# Patient Record
Sex: FEMALE | Race: WHITE | NOT HISPANIC OR LATINO
[De-identification: names, ages, dates, MRNs, and addresses within clinical notes are randomized per-mention and may not be internally consistent; named-entity substitution may affect disease eponyms.]

---

## 2017-01-23 ENCOUNTER — APPOINTMENT (OUTPATIENT)
Dept: INTERNAL MEDICINE | Facility: CLINIC | Age: 82
End: 2017-01-23

## 2017-01-23 VITALS
WEIGHT: 113 LBS | SYSTOLIC BLOOD PRESSURE: 120 MMHG | BODY MASS INDEX: 22.19 KG/M2 | HEIGHT: 60 IN | DIASTOLIC BLOOD PRESSURE: 80 MMHG

## 2017-01-24 ENCOUNTER — RESULT REVIEW (OUTPATIENT)
Age: 82
End: 2017-01-24

## 2017-01-24 LAB
ANION GAP SERPL CALC-SCNC: 16 MMOL/L
BASOPHILS # BLD AUTO: 0.04 K/UL
BASOPHILS NFR BLD AUTO: 0.5 %
BUN SERPL-MCNC: 16 MG/DL
CALCIUM SERPL-MCNC: 9.6 MG/DL
CHLORIDE SERPL-SCNC: 106 MMOL/L
CHOLEST SERPL-MCNC: 197 MG/DL
CHOLEST/HDLC SERPL: 2.5 RATIO
CO2 SERPL-SCNC: 20 MMOL/L
CREAT SERPL-MCNC: 0.65 MG/DL
EOSINOPHIL # BLD AUTO: 0.15 K/UL
EOSINOPHIL NFR BLD AUTO: 2 %
GLUCOSE SERPL-MCNC: 139 MG/DL
HCT VFR BLD CALC: 37.3 %
HDLC SERPL-MCNC: 80 MG/DL
HGB BLD-MCNC: 11.9 G/DL
IMM GRANULOCYTES NFR BLD AUTO: 0.1 %
LDLC SERPL CALC-MCNC: 100 MG/DL
LYMPHOCYTES # BLD AUTO: 1.82 K/UL
LYMPHOCYTES NFR BLD AUTO: 23.9 %
MAN DIFF?: NORMAL
MCHC RBC-ENTMCNC: 29.5 PG
MCHC RBC-ENTMCNC: 31.9 GM/DL
MCV RBC AUTO: 92.6 FL
MONOCYTES # BLD AUTO: 0.39 K/UL
MONOCYTES NFR BLD AUTO: 5.1 %
NEUTROPHILS # BLD AUTO: 5.2 K/UL
NEUTROPHILS NFR BLD AUTO: 68.4 %
PLATELET # BLD AUTO: 239 K/UL
POTASSIUM SERPL-SCNC: 4.2 MMOL/L
RBC # BLD: 4.03 M/UL
RBC # FLD: 13.4 %
SODIUM SERPL-SCNC: 143 MMOL/L
T4 FREE SERPL-MCNC: 1.2 NG/DL
TRIGL SERPL-MCNC: 84 MG/DL
TSH SERPL-ACNC: 1.78 UIU/ML
WBC # FLD AUTO: 7.61 K/UL

## 2017-03-01 ENCOUNTER — RX RENEWAL (OUTPATIENT)
Age: 82
End: 2017-03-01

## 2017-05-18 ENCOUNTER — APPOINTMENT (OUTPATIENT)
Dept: INTERNAL MEDICINE | Facility: CLINIC | Age: 82
End: 2017-05-18

## 2017-05-23 ENCOUNTER — APPOINTMENT (OUTPATIENT)
Dept: INTERNAL MEDICINE | Facility: CLINIC | Age: 82
End: 2017-05-23

## 2017-08-05 ENCOUNTER — RX RENEWAL (OUTPATIENT)
Age: 82
End: 2017-08-05

## 2017-08-08 ENCOUNTER — APPOINTMENT (OUTPATIENT)
Dept: INTERNAL MEDICINE | Facility: CLINIC | Age: 82
End: 2017-08-08
Payer: MEDICARE

## 2017-08-08 VITALS — BODY MASS INDEX: 22.58 KG/M2 | HEIGHT: 60 IN | WEIGHT: 115 LBS

## 2017-08-08 VITALS
DIASTOLIC BLOOD PRESSURE: 70 MMHG | HEIGHT: 60 IN | WEIGHT: 115 LBS | SYSTOLIC BLOOD PRESSURE: 118 MMHG | BODY MASS INDEX: 22.58 KG/M2

## 2017-08-08 PROCEDURE — 36415 COLL VENOUS BLD VENIPUNCTURE: CPT

## 2017-08-08 PROCEDURE — 99213 OFFICE O/P EST LOW 20 MIN: CPT | Mod: 25

## 2017-08-10 LAB
25(OH)D3 SERPL-MCNC: 32.9 NG/ML
ANION GAP SERPL CALC-SCNC: 16 MMOL/L
BASOPHILS # BLD AUTO: 0.07 K/UL
BASOPHILS NFR BLD AUTO: 1.1 %
BUN SERPL-MCNC: 21 MG/DL
CALCIUM SERPL-MCNC: 10.1 MG/DL
CHLORIDE SERPL-SCNC: 102 MMOL/L
CHOLEST SERPL-MCNC: 225 MG/DL
CHOLEST/HDLC SERPL: 2.6 RATIO
CO2 SERPL-SCNC: 24 MMOL/L
CREAT SERPL-MCNC: 0.64 MG/DL
EOSINOPHIL # BLD AUTO: 0.14 K/UL
EOSINOPHIL NFR BLD AUTO: 2.2 %
GLUCOSE SERPL-MCNC: 92 MG/DL
HCT VFR BLD CALC: 39.3 %
HDLC SERPL-MCNC: 86 MG/DL
HGB BLD-MCNC: 12.3 G/DL
IMM GRANULOCYTES NFR BLD AUTO: 0.2 %
LDLC SERPL CALC-MCNC: 123 MG/DL
LYMPHOCYTES # BLD AUTO: 1.69 K/UL
LYMPHOCYTES NFR BLD AUTO: 26.1 %
MAN DIFF?: NORMAL
MCHC RBC-ENTMCNC: 28.6 PG
MCHC RBC-ENTMCNC: 31.3 GM/DL
MCV RBC AUTO: 91.4 FL
MONOCYTES # BLD AUTO: 0.33 K/UL
MONOCYTES NFR BLD AUTO: 5.1 %
NEUTROPHILS # BLD AUTO: 4.24 K/UL
NEUTROPHILS NFR BLD AUTO: 65.3 %
PLATELET # BLD AUTO: 267 K/UL
POTASSIUM SERPL-SCNC: 4.3 MMOL/L
RBC # BLD: 4.3 M/UL
RBC # FLD: 13.8 %
SODIUM SERPL-SCNC: 142 MMOL/L
T4 FREE SERPL-MCNC: 1.4 NG/DL
TRIGL SERPL-MCNC: 79 MG/DL
TSH SERPL-ACNC: 1.34 UIU/ML
WBC # FLD AUTO: 6.48 K/UL

## 2017-08-30 ENCOUNTER — RX RENEWAL (OUTPATIENT)
Age: 82
End: 2017-08-30

## 2017-11-07 ENCOUNTER — APPOINTMENT (OUTPATIENT)
Dept: INTERNAL MEDICINE | Facility: CLINIC | Age: 82
End: 2017-11-07
Payer: MEDICARE

## 2017-11-07 VITALS
DIASTOLIC BLOOD PRESSURE: 70 MMHG | BODY MASS INDEX: 22.58 KG/M2 | OXYGEN SATURATION: 98 % | WEIGHT: 115 LBS | SYSTOLIC BLOOD PRESSURE: 112 MMHG | HEART RATE: 70 BPM | HEIGHT: 60 IN

## 2017-11-07 PROCEDURE — 99214 OFFICE O/P EST MOD 30 MIN: CPT | Mod: 25

## 2017-11-07 PROCEDURE — 36415 COLL VENOUS BLD VENIPUNCTURE: CPT

## 2017-11-07 PROCEDURE — G0009: CPT

## 2017-11-07 PROCEDURE — 90670 PCV13 VACCINE IM: CPT

## 2017-11-07 PROCEDURE — 90662 IIV NO PRSV INCREASED AG IM: CPT

## 2017-11-07 PROCEDURE — G0008: CPT

## 2017-11-10 LAB
ANION GAP SERPL CALC-SCNC: 11 MMOL/L
BUN SERPL-MCNC: 18 MG/DL
CALCIUM SERPL-MCNC: 10 MG/DL
CHLORIDE SERPL-SCNC: 104 MMOL/L
CHOLEST SERPL-MCNC: 205 MG/DL
CHOLEST/HDLC SERPL: 2.7 RATIO
CO2 SERPL-SCNC: 29 MMOL/L
CREAT SERPL-MCNC: 0.74 MG/DL
GLUCOSE SERPL-MCNC: 83 MG/DL
HDLC SERPL-MCNC: 77 MG/DL
LDLC SERPL CALC-MCNC: 110 MG/DL
POTASSIUM SERPL-SCNC: 4.2 MMOL/L
SODIUM SERPL-SCNC: 144 MMOL/L
T4 FREE SERPL-MCNC: 1.3 NG/DL
TRIGL SERPL-MCNC: 88 MG/DL
TSH SERPL-ACNC: 1.5 UIU/ML

## 2018-01-02 ENCOUNTER — APPOINTMENT (OUTPATIENT)
Dept: INTERNAL MEDICINE | Facility: CLINIC | Age: 83
End: 2018-01-02

## 2018-01-18 ENCOUNTER — RX RENEWAL (OUTPATIENT)
Age: 83
End: 2018-01-18

## 2018-02-13 ENCOUNTER — NON-APPOINTMENT (OUTPATIENT)
Age: 83
End: 2018-02-13

## 2018-02-13 ENCOUNTER — APPOINTMENT (OUTPATIENT)
Dept: INTERNAL MEDICINE | Facility: CLINIC | Age: 83
End: 2018-02-13
Payer: MEDICARE

## 2018-02-13 VITALS
DIASTOLIC BLOOD PRESSURE: 68 MMHG | HEART RATE: 67 BPM | HEIGHT: 60 IN | SYSTOLIC BLOOD PRESSURE: 118 MMHG | WEIGHT: 115 LBS | OXYGEN SATURATION: 98 % | BODY MASS INDEX: 22.58 KG/M2

## 2018-02-13 VITALS
HEART RATE: 67 BPM | HEIGHT: 72 IN | SYSTOLIC BLOOD PRESSURE: 118 MMHG | BODY MASS INDEX: 15.58 KG/M2 | DIASTOLIC BLOOD PRESSURE: 68 MMHG | WEIGHT: 115 LBS

## 2018-02-13 DIAGNOSIS — Z82.3 FAMILY HISTORY OF STROKE: ICD-10-CM

## 2018-02-13 DIAGNOSIS — Z82.49 FAMILY HISTORY OF ISCHEMIC HEART DISEASE AND OTHER DISEASES OF THE CIRCULATORY SYSTEM: ICD-10-CM

## 2018-02-13 DIAGNOSIS — Z92.29 PERSONAL HISTORY OF OTHER DRUG THERAPY: ICD-10-CM

## 2018-02-13 DIAGNOSIS — Z86.73 PERSONAL HISTORY OF TRANSIENT ISCHEMIC ATTACK (TIA), AND CEREBRAL INFARCTION W/OUT RESIDUAL DEFICITS: ICD-10-CM

## 2018-02-13 DIAGNOSIS — S81.811A LACERATION W/OUT FOREIGN BODY, RIGHT LOWER LEG, INITIAL ENCOUNTER: ICD-10-CM

## 2018-02-13 DIAGNOSIS — Z23 ENCOUNTER FOR IMMUNIZATION: ICD-10-CM

## 2018-02-13 PROCEDURE — G0439: CPT

## 2018-02-13 PROCEDURE — 36415 COLL VENOUS BLD VENIPUNCTURE: CPT

## 2018-02-14 LAB
25(OH)D3 SERPL-MCNC: 26.3 NG/ML
ALBUMIN SERPL ELPH-MCNC: 4.4 G/DL
ALP BLD-CCNC: 82 U/L
ALT SERPL-CCNC: 11 U/L
ANION GAP SERPL CALC-SCNC: 18 MMOL/L
AST SERPL-CCNC: 12 U/L
BASOPHILS # BLD AUTO: 0.06 K/UL
BASOPHILS NFR BLD AUTO: 0.9 %
BILIRUB SERPL-MCNC: 0.3 MG/DL
BUN SERPL-MCNC: 14 MG/DL
CALCIUM SERPL-MCNC: 9.6 MG/DL
CHLORIDE SERPL-SCNC: 102 MMOL/L
CHOLEST SERPL-MCNC: 213 MG/DL
CHOLEST/HDLC SERPL: 2.3 RATIO
CO2 SERPL-SCNC: 25 MMOL/L
CREAT SERPL-MCNC: 0.68 MG/DL
EOSINOPHIL # BLD AUTO: 0.23 K/UL
EOSINOPHIL NFR BLD AUTO: 3.6 %
GLUCOSE SERPL-MCNC: 48 MG/DL
HBA1C MFR BLD HPLC: 5.8 %
HCT VFR BLD CALC: 39.4 %
HDLC SERPL-MCNC: 91 MG/DL
HGB BLD-MCNC: 12.3 G/DL
IMM GRANULOCYTES NFR BLD AUTO: 0.3 %
LDLC SERPL CALC-MCNC: 106 MG/DL
LYMPHOCYTES # BLD AUTO: 1.64 K/UL
LYMPHOCYTES NFR BLD AUTO: 25.7 %
MAN DIFF?: NORMAL
MCHC RBC-ENTMCNC: 28.8 PG
MCHC RBC-ENTMCNC: 31.2 GM/DL
MCV RBC AUTO: 92.3 FL
MONOCYTES # BLD AUTO: 0.44 K/UL
MONOCYTES NFR BLD AUTO: 6.9 %
NEUTROPHILS # BLD AUTO: 4 K/UL
NEUTROPHILS NFR BLD AUTO: 62.6 %
PLATELET # BLD AUTO: 283 K/UL
POTASSIUM SERPL-SCNC: 4.3 MMOL/L
PROT SERPL-MCNC: 7.1 G/DL
RBC # BLD: 4.27 M/UL
RBC # FLD: 14.1 %
SODIUM SERPL-SCNC: 145 MMOL/L
T4 FREE SERPL-MCNC: 1.4 NG/DL
TRIGL SERPL-MCNC: 78 MG/DL
TSH SERPL-ACNC: 1.14 UIU/ML
WBC # FLD AUTO: 6.39 K/UL

## 2018-05-22 ENCOUNTER — APPOINTMENT (OUTPATIENT)
Dept: INTERNAL MEDICINE | Facility: CLINIC | Age: 83
End: 2018-05-22
Payer: MEDICARE

## 2018-05-22 VITALS
BODY MASS INDEX: 22.58 KG/M2 | WEIGHT: 115 LBS | HEART RATE: 68 BPM | DIASTOLIC BLOOD PRESSURE: 70 MMHG | OXYGEN SATURATION: 96 % | SYSTOLIC BLOOD PRESSURE: 124 MMHG | HEIGHT: 60 IN

## 2018-05-22 PROCEDURE — 36415 COLL VENOUS BLD VENIPUNCTURE: CPT

## 2018-05-22 PROCEDURE — 99214 OFFICE O/P EST MOD 30 MIN: CPT | Mod: 25

## 2018-05-22 NOTE — ASSESSMENT
[FreeTextEntry1] : She is doing very well. Blood pressure is controlled.\par Check labs.\par She had flu and Prevnar vaccines.\par No cancer screening indicated given age.\par Followup in 4 months.

## 2018-05-22 NOTE — REVIEW OF SYSTEMS
[Hoarseness] : hoarseness [Negative] : Heme/Lymph [FreeTextEntry4] : tinnitus [FreeTextEntry7] : reflux [FreeTextEntry9] : knee pain

## 2018-05-22 NOTE — HISTORY OF PRESENT ILLNESS
[de-identified] : Patient presents for follow up of hypertension on amlodipine and enalapril, hypothyroidism on Synthroid and GERD on omeprazole. She also has hyperlipidemia and prediabetes not on medication. \par She has been seeing Dr. Berg for left knee osteoarthritis and right knee bursitis. She has had steroid injections x2 since January with some improvement. She feels well today.\par

## 2018-05-22 NOTE — PHYSICAL EXAM

## 2018-05-23 LAB
ANION GAP SERPL CALC-SCNC: 12 MMOL/L
BUN SERPL-MCNC: 15 MG/DL
CALCIUM SERPL-MCNC: 9.5 MG/DL
CHLORIDE SERPL-SCNC: 106 MMOL/L
CHOLEST SERPL-MCNC: 216 MG/DL
CHOLEST/HDLC SERPL: 2.7 RATIO
CO2 SERPL-SCNC: 26 MMOL/L
CREAT SERPL-MCNC: 0.68 MG/DL
GLUCOSE SERPL-MCNC: 86 MG/DL
HDLC SERPL-MCNC: 81 MG/DL
LDLC SERPL CALC-MCNC: 122 MG/DL
POTASSIUM SERPL-SCNC: 4.1 MMOL/L
SODIUM SERPL-SCNC: 144 MMOL/L
T4 FREE SERPL-MCNC: 1.6 NG/DL
TRIGL SERPL-MCNC: 66 MG/DL
TSH SERPL-ACNC: 1.06 UIU/ML

## 2018-07-17 ENCOUNTER — RX RENEWAL (OUTPATIENT)
Age: 83
End: 2018-07-17

## 2018-08-07 ENCOUNTER — RX RENEWAL (OUTPATIENT)
Age: 83
End: 2018-08-07

## 2018-08-16 ENCOUNTER — APPOINTMENT (OUTPATIENT)
Dept: INTERNAL MEDICINE | Facility: CLINIC | Age: 83
End: 2018-08-16
Payer: MEDICARE

## 2018-08-16 VITALS
OXYGEN SATURATION: 97 % | HEART RATE: 77 BPM | BODY MASS INDEX: 22.19 KG/M2 | WEIGHT: 113 LBS | SYSTOLIC BLOOD PRESSURE: 124 MMHG | DIASTOLIC BLOOD PRESSURE: 76 MMHG | HEIGHT: 60 IN

## 2018-08-16 PROCEDURE — 99214 OFFICE O/P EST MOD 30 MIN: CPT | Mod: 25

## 2018-08-16 PROCEDURE — 36415 COLL VENOUS BLD VENIPUNCTURE: CPT

## 2018-08-16 NOTE — PHYSICAL EXAM
[No Acute Distress] : no acute distress [Well Nourished] : well nourished [Well Developed] : well developed [Well-Appearing] : well-appearing [Normal Sclera/Conjunctiva] : normal sclera/conjunctiva [PERRL] : pupils equal round and reactive to light [EOMI] : extraocular movements intact [Normal Outer Ear/Nose] : the outer ears and nose were normal in appearance [Normal Oropharynx] : the oropharynx was normal [No JVD] : no jugular venous distention [Supple] : supple [No Lymphadenopathy] : no lymphadenopathy [Thyroid Normal, No Nodules] : the thyroid was normal and there were no nodules present [No Respiratory Distress] : no respiratory distress  [Clear to Auscultation] : lungs were clear to auscultation bilaterally [No Accessory Muscle Use] : no accessory muscle use [Normal Rate] : normal rate  [Normal S1, S2] : normal S1 and S2 [No Murmur] : no murmur heard [No Edema] : there was no peripheral edema [Normal Affect] : the affect was normal [Normal Insight/Judgement] : insight and judgment were intact [de-identified] : occasional skipped beat

## 2018-08-16 NOTE — HISTORY OF PRESENT ILLNESS
[de-identified] : Patient presents for follow up of hypertension on amlodipine and enalapril, hypothyroidism on Synthroid and GERD on omeprazole. She also has hyperlipidemia and prediabetes not on medication. She complains of fatigue, had a few episodes of extreme fatigue upon awakening in the morning, resolved by noon. No fever, chills, chest pain, palpitations. Feels well today.

## 2018-08-16 NOTE — REVIEW OF SYSTEMS
[Fatigue] : fatigue [Hoarseness] : hoarseness [Negative] : Heme/Lymph [FreeTextEntry4] : tinnitus [FreeTextEntry7] : reflux [FreeTextEntry9] : knee pain

## 2018-08-19 LAB
25(OH)D3 SERPL-MCNC: 33.1 NG/ML
ALBUMIN SERPL ELPH-MCNC: 4.4 G/DL
ALP BLD-CCNC: 83 U/L
ALT SERPL-CCNC: 12 U/L
ANION GAP SERPL CALC-SCNC: 16 MMOL/L
AST SERPL-CCNC: 10 U/L
BILIRUB SERPL-MCNC: 0.2 MG/DL
BUN SERPL-MCNC: 14 MG/DL
CALCIUM SERPL-MCNC: 9.6 MG/DL
CHLORIDE SERPL-SCNC: 101 MMOL/L
CHOLEST SERPL-MCNC: 200 MG/DL
CHOLEST/HDLC SERPL: 2.6 RATIO
CO2 SERPL-SCNC: 23 MMOL/L
CREAT SERPL-MCNC: 0.72 MG/DL
GLUCOSE SERPL-MCNC: 101 MG/DL
HBA1C MFR BLD HPLC: 6 %
HDLC SERPL-MCNC: 78 MG/DL
LDLC SERPL CALC-MCNC: 108 MG/DL
MAGNESIUM SERPL-MCNC: 2 MG/DL
POTASSIUM SERPL-SCNC: 4.1 MMOL/L
PROT SERPL-MCNC: 6.6 G/DL
SODIUM SERPL-SCNC: 140 MMOL/L
T4 FREE SERPL-MCNC: 1.3 NG/DL
TRIGL SERPL-MCNC: 70 MG/DL
TSH SERPL-ACNC: 1.28 UIU/ML

## 2018-08-30 ENCOUNTER — APPOINTMENT (OUTPATIENT)
Dept: INTERNAL MEDICINE | Facility: CLINIC | Age: 83
End: 2018-08-30
Payer: MEDICARE

## 2018-08-30 ENCOUNTER — APPOINTMENT (OUTPATIENT)
Dept: RADIOLOGY | Facility: CLINIC | Age: 83
End: 2018-08-30
Payer: MEDICARE

## 2018-08-30 ENCOUNTER — OUTPATIENT (OUTPATIENT)
Dept: OUTPATIENT SERVICES | Facility: HOSPITAL | Age: 83
LOS: 1 days | End: 2018-08-30
Payer: MEDICARE

## 2018-08-30 VITALS
BODY MASS INDEX: 23.16 KG/M2 | SYSTOLIC BLOOD PRESSURE: 122 MMHG | HEIGHT: 60 IN | WEIGHT: 118 LBS | HEART RATE: 71 BPM | OXYGEN SATURATION: 98 % | DIASTOLIC BLOOD PRESSURE: 78 MMHG

## 2018-08-30 DIAGNOSIS — M79.672 PAIN IN LEFT FOOT: ICD-10-CM

## 2018-08-30 DIAGNOSIS — S93.402A SPRAIN OF UNSPECIFIED LIGAMENT OF LEFT ANKLE, INITIAL ENCOUNTER: ICD-10-CM

## 2018-08-30 PROCEDURE — 73630 X-RAY EXAM OF FOOT: CPT | Mod: 26,LT

## 2018-08-30 PROCEDURE — 73610 X-RAY EXAM OF ANKLE: CPT | Mod: 26,LT

## 2018-08-30 PROCEDURE — 73610 X-RAY EXAM OF ANKLE: CPT

## 2018-08-30 PROCEDURE — 73630 X-RAY EXAM OF FOOT: CPT

## 2018-08-30 PROCEDURE — 99214 OFFICE O/P EST MOD 30 MIN: CPT

## 2018-08-30 NOTE — HISTORY OF PRESENT ILLNESS
[FreeTextEntry8] : Patient complains of pain and swelling of her left ankle. States that one week ago, as she was walking up the stairs, she thought there was one more step and twisted her left ankle as she put it down. She developed pain in the left ankle, difficulty walking on it, was using her walker for several days. Has swelling in the left foot, ankle to calf for the past week. Has some improvement in pain now and able to walk without a walker. She has taken tylenol for pain.

## 2018-08-30 NOTE — ASSESSMENT
[FreeTextEntry1] : Xray left foot and ankle to rule out fracture. \par Tylenol prn pain. Advised ice, ace wrap, elevate foot, use walker.

## 2018-08-30 NOTE — PHYSICAL EXAM
[No Acute Distress] : no acute distress [No Respiratory Distress] : no respiratory distress  [Clear to Auscultation] : lungs were clear to auscultation bilaterally [No Accessory Muscle Use] : no accessory muscle use [Normal Rate] : normal rate  [Regular Rhythm] : with a regular rhythm [Normal S1, S2] : normal S1 and S2 [Grossly Normal Strength/Tone] : grossly normal strength/tone [Normal Affect] : the affect was normal [Alert and Oriented x3] : oriented to person, place, and time [Normal Insight/Judgement] : insight and judgment were intact [de-identified] : no point tenderness over left ankle, 2+ pitting edema to lower calf

## 2018-10-09 ENCOUNTER — RX RENEWAL (OUTPATIENT)
Age: 83
End: 2018-10-09

## 2018-11-12 ENCOUNTER — RX RENEWAL (OUTPATIENT)
Age: 83
End: 2018-11-12

## 2018-11-13 ENCOUNTER — LABORATORY RESULT (OUTPATIENT)
Age: 83
End: 2018-11-13

## 2018-11-13 ENCOUNTER — APPOINTMENT (OUTPATIENT)
Dept: INTERNAL MEDICINE | Facility: CLINIC | Age: 83
End: 2018-11-13
Payer: MEDICARE

## 2018-11-13 VITALS
OXYGEN SATURATION: 93 % | HEART RATE: 73 BPM | SYSTOLIC BLOOD PRESSURE: 120 MMHG | HEIGHT: 60 IN | BODY MASS INDEX: 22.58 KG/M2 | DIASTOLIC BLOOD PRESSURE: 70 MMHG | WEIGHT: 115 LBS

## 2018-11-13 PROCEDURE — 36415 COLL VENOUS BLD VENIPUNCTURE: CPT

## 2018-11-13 PROCEDURE — 90732 PPSV23 VACC 2 YRS+ SUBQ/IM: CPT

## 2018-11-13 PROCEDURE — 90662 IIV NO PRSV INCREASED AG IM: CPT

## 2018-11-13 PROCEDURE — G0008: CPT

## 2018-11-13 PROCEDURE — G0009: CPT

## 2018-11-13 PROCEDURE — 99214 OFFICE O/P EST MOD 30 MIN: CPT | Mod: 25

## 2018-11-13 NOTE — ASSESSMENT
[FreeTextEntry1] : Blood pressure is controlled. Continue current medications.\par Check labs. Further recommendations based on results.\par She Prevnar vaccine.\par Flu and pneumovax given today.\par No cancer screening indicated given age.\par Followup in 4 months.

## 2018-11-13 NOTE — HISTORY OF PRESENT ILLNESS
[de-identified] : Patient presents for follow up of hypertension on amlodipine and enalapril, hypothyroidism on Synthroid and GERD on omeprazole. She also has hyperlipidemia and prediabetes not on medication. She complains of leg cramps, has had vascular evaluation by Dr. Feliciano that was normal. Sees Dr. Haley for MSK complaints.

## 2018-11-13 NOTE — PHYSICAL EXAM
[No Acute Distress] : no acute distress [Well Nourished] : well nourished [Well Developed] : well developed [Well-Appearing] : well-appearing [No Respiratory Distress] : no respiratory distress  [Clear to Auscultation] : lungs were clear to auscultation bilaterally [No Accessory Muscle Use] : no accessory muscle use [Normal Rate] : normal rate  [Regular Rhythm] : with a regular rhythm [Normal S1, S2] : normal S1 and S2 [No Murmur] : no murmur heard [No Edema] : there was no peripheral edema [Normal Affect] : the affect was normal [Normal Insight/Judgement] : insight and judgment were intact

## 2018-11-15 LAB — T4 FREE SERPL-MCNC: 1.4 NG/DL

## 2019-03-07 ENCOUNTER — APPOINTMENT (OUTPATIENT)
Dept: INTERNAL MEDICINE | Facility: CLINIC | Age: 84
End: 2019-03-07

## 2019-03-26 ENCOUNTER — APPOINTMENT (OUTPATIENT)
Dept: INTERNAL MEDICINE | Facility: CLINIC | Age: 84
End: 2019-03-26
Payer: MEDICARE

## 2019-03-26 ENCOUNTER — NON-APPOINTMENT (OUTPATIENT)
Age: 84
End: 2019-03-26

## 2019-03-26 VITALS
HEART RATE: 60 BPM | WEIGHT: 118 LBS | HEIGHT: 60 IN | SYSTOLIC BLOOD PRESSURE: 128 MMHG | OXYGEN SATURATION: 99 % | BODY MASS INDEX: 23.16 KG/M2 | DIASTOLIC BLOOD PRESSURE: 70 MMHG

## 2019-03-26 PROCEDURE — G0439: CPT

## 2019-03-26 PROCEDURE — 36415 COLL VENOUS BLD VENIPUNCTURE: CPT

## 2019-03-26 PROCEDURE — 93000 ELECTROCARDIOGRAM COMPLETE: CPT

## 2019-03-26 PROCEDURE — G0444 DEPRESSION SCREEN ANNUAL: CPT | Mod: 59

## 2019-03-26 NOTE — HEALTH RISK ASSESSMENT
[Excellent] : ~his/her~ current health as excellent [Very Good] : ~his/her~  mood as very good [No falls in past year] : Patient reported no falls in the past year [0] : 2) Feeling down, depressed, or hopeless: Not at all (0) [None] : None [With Family] : lives with family [Retired] : retired [] :  [# Of Children ___] : has [unfilled] children [Feels Safe at Home] : Feels safe at home [Fully functional (bathing, dressing, toileting, transferring, walking, feeding)] : Fully functional (bathing, dressing, toileting, transferring, walking, feeding) [Fully functional (using the telephone, shopping, preparing meals, housekeeping, doing laundry, using] : Fully functional and needs no help or supervision to perform IADLs (using the telephone, shopping, preparing meals, housekeeping, doing laundry, using transportation, managing medications and managing finances) [Reports normal functional visual acuity (ie: able to read med bottle)] : Reports normal functional visual acuity [Smoke Detector] : smoke detector [Carbon Monoxide Detector] : carbon monoxide detector [Seat Belt] :  uses seat belt [With Patient/Caregiver] : With Patient/Caregiver [Designated Healthcare Proxy] : Designated healthcare proxy [Name: ___] : Health Care Proxy's Name: [unfilled]  [Relationship: ___] : Relationship: [unfilled] [FreeTextEntry1] : cold  [] : No [de-identified] : none [de-identified] : Dr. Colin - ENT, Dr. Fine - pain, Dr. Feliciano - vascular, Dr. Terrell [de-identified] : house work, gardening, walking [de-identified] : well rounded  [[id number]] : 0 [Change in mental status noted] : No change in mental status noted [Language] : denies difficulty with language [Behavior] : denies difficulty with behavior [Learning/Retaining New Information] : denies difficulty learning/retaining new information [Handling Complex Tasks] : denies difficulty handling complex tasks [Reasoning] : denies difficulty with reasoning [Spatial Ability and Orientation] : denies difficulty with spatial ability and orientation [Reports changes in hearing] : Reports no changes in hearing [Reports changes in vision] : Reports no changes in vision [Reports changes in dental health] : Reports no changes in dental health [Guns at Home] : no guns at home [Safety elements used in home] : no safety elements used in home [Sunscreen] : does not use sunscreen [Travel to Developing Areas] : does not  travel to developing areas [TB Exposure] : is not being exposed to tuberculosis [Caregiver Concerns] : does not have caregiver concerns [FreeTextEntry2] :   [AdvancecareDate] : 03/19

## 2019-03-26 NOTE — ASSESSMENT
[FreeTextEntry1] : Blood pressure is controlled. Continue current medications.\par Rx given for DEXA. \par Labs drawn in office. Further recommendations based on lab results. \par Current with specialists. \par Advised magnesium for leg cramps. \par Will follow up with vascular.\par She had Prevnar vaccine.\par Had Flu and pneumovax.\par No cancer screening indicated given age.\par Follow up in 4 months.

## 2019-03-26 NOTE — COUNSELING
[Healthy eating counseling provided] : healthy eating [Activity counseling provided] : activity [Fall prevention counseling provided] : fall prevention  [ - Annual Depression Screening] : Annual Depression Screening

## 2019-03-26 NOTE — HISTORY OF PRESENT ILLNESS
[FreeTextEntry1] : Patient presents for CPE.  [de-identified] : Patient presents for CPE. History is significant for hypertension on amlodipine and enalapril, hypothyroidism on Synthroid and GERD on omeprazole. She also has hyperlipidemia and prediabetes not on medication. She complains that she is always cold and that occasionally her hands turn white. She states that her leg pain and cramps have resolved. Otherwise she feels well.

## 2019-03-26 NOTE — ADDENDUM
[FreeTextEntry1] : I, Angelita Awad, acted solely as a scribe for Dr. Henry on this date [3/26/19]. \par

## 2019-03-26 NOTE — PHYSICAL EXAM

## 2019-03-26 NOTE — DATA REVIEWED
[FreeTextEntry1] : EKG normal sinus rhythm at 81 bpm with incomplete RBBB, nonspecific T wave abnormality.

## 2019-03-26 NOTE — END OF VISIT
[FreeTextEntry3] : All medical record entries made by the Scribe were at my, Dr. Henry's, direction and personally dictated by me on [3/26/19]. I have reviewed the chart and agree that the record accurately reflects my personal performance of the history, physical exam, assessment and plan. I have also personally directed, reviewed, and agreed with the chart.\par

## 2019-03-28 LAB
25(OH)D3 SERPL-MCNC: 33.6 NG/ML
ALBUMIN SERPL ELPH-MCNC: 4.6 G/DL
ALP BLD-CCNC: 73 U/L
ALT SERPL-CCNC: 15 U/L
ANION GAP SERPL CALC-SCNC: 12 MMOL/L
APPEARANCE: CLEAR
AST SERPL-CCNC: 12 U/L
BACTERIA: NEGATIVE
BASOPHILS # BLD AUTO: 0.09 K/UL
BASOPHILS NFR BLD AUTO: 1.6 %
BILIRUB SERPL-MCNC: 0.2 MG/DL
BILIRUBIN URINE: NEGATIVE
BLOOD URINE: NEGATIVE
BUN SERPL-MCNC: 16 MG/DL
CALCIUM SERPL-MCNC: 9.7 MG/DL
CHLORIDE SERPL-SCNC: 106 MMOL/L
CHOLEST SERPL-MCNC: 214 MG/DL
CHOLEST/HDLC SERPL: 2.5 RATIO
CO2 SERPL-SCNC: 28 MMOL/L
COLOR: YELLOW
CREAT SERPL-MCNC: 0.7 MG/DL
EOSINOPHIL # BLD AUTO: 0.17 K/UL
EOSINOPHIL NFR BLD AUTO: 3 %
ESTIMATED AVERAGE GLUCOSE: 126 MG/DL
GLUCOSE QUALITATIVE U: NEGATIVE
GLUCOSE SERPL-MCNC: 85 MG/DL
HBA1C MFR BLD HPLC: 6 %
HCT VFR BLD CALC: 41.8 %
HDLC SERPL-MCNC: 87 MG/DL
HGB BLD-MCNC: 12.6 G/DL
HYALINE CASTS: 0 /LPF
IMM GRANULOCYTES NFR BLD AUTO: 0.3 %
KETONES URINE: NEGATIVE
LDLC SERPL CALC-MCNC: 116 MG/DL
LEUKOCYTE ESTERASE URINE: NEGATIVE
LYMPHOCYTES # BLD AUTO: 1.88 K/UL
LYMPHOCYTES NFR BLD AUTO: 32.7 %
MAN DIFF?: NORMAL
MCHC RBC-ENTMCNC: 28.6 PG
MCHC RBC-ENTMCNC: 30.1 GM/DL
MCV RBC AUTO: 95 FL
MICROSCOPIC-UA: NORMAL
MONOCYTES # BLD AUTO: 0.46 K/UL
MONOCYTES NFR BLD AUTO: 8 %
NEUTROPHILS # BLD AUTO: 3.13 K/UL
NEUTROPHILS NFR BLD AUTO: 54.4 %
NITRITE URINE: NEGATIVE
PH URINE: 7
PLATELET # BLD AUTO: 273 K/UL
POTASSIUM SERPL-SCNC: 3.9 MMOL/L
PROT SERPL-MCNC: 7 G/DL
PROTEIN URINE: NEGATIVE
RBC # BLD: 4.4 M/UL
RBC # FLD: 13.6 %
RED BLOOD CELLS URINE: 5 /HPF
SODIUM SERPL-SCNC: 146 MMOL/L
SPECIFIC GRAVITY URINE: 1.01
SQUAMOUS EPITHELIAL CELLS: 0 /HPF
T4 FREE SERPL-MCNC: 1.3 NG/DL
TRIGL SERPL-MCNC: 53 MG/DL
TSH SERPL-ACNC: 3.11 UIU/ML
UROBILINOGEN URINE: NORMAL
WBC # FLD AUTO: 5.75 K/UL
WHITE BLOOD CELLS URINE: 0 /HPF

## 2019-05-28 ENCOUNTER — LABORATORY RESULT (OUTPATIENT)
Age: 84
End: 2019-05-28

## 2019-05-28 ENCOUNTER — APPOINTMENT (OUTPATIENT)
Dept: INTERNAL MEDICINE | Facility: CLINIC | Age: 84
End: 2019-05-28
Payer: MEDICARE

## 2019-05-28 ENCOUNTER — NON-APPOINTMENT (OUTPATIENT)
Age: 84
End: 2019-05-28

## 2019-05-28 VITALS
BODY MASS INDEX: 22.78 KG/M2 | RESPIRATION RATE: 15 BRPM | HEART RATE: 73 BPM | DIASTOLIC BLOOD PRESSURE: 70 MMHG | OXYGEN SATURATION: 97 % | SYSTOLIC BLOOD PRESSURE: 116 MMHG | WEIGHT: 116 LBS | HEIGHT: 60 IN

## 2019-05-28 LAB
BILIRUB UR QL STRIP: NORMAL
GLUCOSE UR-MCNC: NORMAL
HCG UR QL: 1 EU/DL
HGB UR QL STRIP.AUTO: NORMAL
KETONES UR-MCNC: NORMAL
LEUKOCYTE ESTERASE UR QL STRIP: NORMAL
NITRITE UR QL STRIP: POSITIVE
PH UR STRIP: 6
PROT UR STRIP-MCNC: NORMAL
SP GR UR STRIP: 1.01

## 2019-05-28 PROCEDURE — 99214 OFFICE O/P EST MOD 30 MIN: CPT | Mod: 25

## 2019-05-28 PROCEDURE — 36415 COLL VENOUS BLD VENIPUNCTURE: CPT

## 2019-05-28 PROCEDURE — 93000 ELECTROCARDIOGRAM COMPLETE: CPT

## 2019-05-28 PROCEDURE — 81003 URINALYSIS AUTO W/O SCOPE: CPT | Mod: QW

## 2019-05-28 NOTE — ADDENDUM
[FreeTextEntry1] : I, Meghana Anton, acted solely as a scribe for Dr. Henry on this date [5/28/2019].

## 2019-05-28 NOTE — PHYSICAL EXAM
[Well Nourished] : well nourished [No Acute Distress] : no acute distress [Well Developed] : well developed [Well-Appearing] : well-appearing [No Accessory Muscle Use] : no accessory muscle use [No Respiratory Distress] : no respiratory distress  [Clear to Auscultation] : lungs were clear to auscultation bilaterally [Regular Rhythm] : with a regular rhythm [Normal Rate] : normal rate  [No Murmur] : no murmur heard [Normal S1, S2] : normal S1 and S2 [Soft] : abdomen soft [Non Tender] : non-tender [No Masses] : no abdominal mass palpated [Non-distended] : non-distended [No HSM] : no HSM [Normal Bowel Sounds] : normal bowel sounds [No CVA Tenderness] : no CVA  tenderness [No Spinal Tenderness] : no spinal tenderness [Normal Affect] : the affect was normal [Alert and Oriented x3] : oriented to person, place, and time [Normal Insight/Judgement] : insight and judgment were intact

## 2019-05-28 NOTE — ASSESSMENT
[FreeTextEntry1] : UA positive. Rx given for Keflex. Urine culture sent out.\par Chest pain atypical in nature, EKG unchanged. Low likelihood of ACS but will send out troponin to rule out. \par Labs drawn in office. \par

## 2019-05-28 NOTE — REVIEW OF SYSTEMS
[Chest Pain] : chest pain [Frequency] : frequency [Negative] : Heme/Lymph [FreeTextEntry9] : abdominal pain, shoulder pain

## 2019-05-28 NOTE — HISTORY OF PRESENT ILLNESS
[FreeTextEntry8] : Patient presents with urinary frequency for three days. She is accompanied by her daughter. She had associated dizziness two days ago. She has an associated pulling pain in her chest, shoulders, and abdomen that lasted all day yesterday, not she has it only when she urinates. She denies shortness of breath, fever, nausea, and vomiting. She has had UTIs in the past. Never had this pulling sensation in chest with it.

## 2019-05-28 NOTE — END OF VISIT
[FreeTextEntry3] : All medical record entries made by the Scribe were at my, Dr. Henry's, direction and personally dictated by me on [5/28/2019]. I have reviewed the chart and agree that the record accurately reflects my personal performance of the history, physical exam, assessment and plan. I have also personally directed, reviewed and agreed with the chart.

## 2019-05-28 NOTE — DATA REVIEWED
[FreeTextEntry1] : EKG sinus rhythm at 70 bpm, diffuse low voltage, IRBBB, unchanged from prior.\par \par UA - positive nitrate, LE

## 2019-05-29 ENCOUNTER — RESULT REVIEW (OUTPATIENT)
Age: 84
End: 2019-05-29

## 2019-05-31 LAB — BACTERIA UR CULT: ABNORMAL

## 2019-06-14 ENCOUNTER — TRANSCRIPTION ENCOUNTER (OUTPATIENT)
Age: 84
End: 2019-06-14

## 2019-06-14 ENCOUNTER — APPOINTMENT (OUTPATIENT)
Dept: INTERNAL MEDICINE | Facility: CLINIC | Age: 84
End: 2019-06-14
Payer: MEDICARE

## 2019-06-14 VITALS
SYSTOLIC BLOOD PRESSURE: 112 MMHG | HEART RATE: 78 BPM | RESPIRATION RATE: 15 BRPM | WEIGHT: 117 LBS | BODY MASS INDEX: 22.97 KG/M2 | DIASTOLIC BLOOD PRESSURE: 70 MMHG | OXYGEN SATURATION: 98 % | HEIGHT: 60 IN

## 2019-06-14 DIAGNOSIS — M54.5 LOW BACK PAIN: ICD-10-CM

## 2019-06-14 PROCEDURE — 99214 OFFICE O/P EST MOD 30 MIN: CPT

## 2019-06-14 NOTE — ASSESSMENT
[FreeTextEntry1] : X ray of pelvis and right hip for further evaluation. Further recommendations based on results. \par Tylenol prn pain.

## 2019-06-14 NOTE — REVIEW OF SYSTEMS
[Back Pain] : back pain [Negative] : Constitutional [FreeTextEntry8] : UTI symptoms improved [FreeTextEntry9] : right hip/low back pain, difficulty walking due to pain

## 2019-06-14 NOTE — HISTORY OF PRESENT ILLNESS
[FreeTextEntry8] : Patient presents with severe, sharp right lower back pain/buttocks for 5 days that is worse with standing. The pain has resolved for now, it also resolved 2 days ago but it returned. Her walking has improved in the past 2 days. She has taken Tylenol with some improvement. She denies injury. She has never had this before. \par She went to urgent care 2 days ago with incontinence. She was treated for a UTI with Bactrim for 5 days.\par She is accompanied by her daughter.

## 2019-06-14 NOTE — ADDENDUM
[FreeTextEntry1] : I, Meghana Anton, acted solely as a scribe for Dr. Henry on this date [6/14/2019].

## 2019-06-14 NOTE — END OF VISIT
[FreeTextEntry3] : All medical record entries made by the Scribe were at my, Dr. Henry's, direction and personally dictated by me on [6/14/2019]. I have reviewed the chart and agree that the record accurately reflects my personal performance of the history, physical exam, assessment and plan. I have also personally directed, reviewed and agreed with the chart.

## 2019-06-14 NOTE — PHYSICAL EXAM
[No Acute Distress] : no acute distress [Well Nourished] : well nourished [Well Developed] : well developed [Well-Appearing] : well-appearing [No CVA Tenderness] : no CVA  tenderness [No Spinal Tenderness] : no spinal tenderness [Normal Affect] : the affect was normal [Alert and Oriented x3] : oriented to person, place, and time [Normal Insight/Judgement] : insight and judgment were intact [Grossly Normal Strength/Tone] : grossly normal strength/tone [de-identified] : antalgic gait, no tenderness over right hip, buttocks, low back

## 2019-06-17 ENCOUNTER — FORM ENCOUNTER (OUTPATIENT)
Age: 84
End: 2019-06-17

## 2019-06-18 ENCOUNTER — RESULT REVIEW (OUTPATIENT)
Age: 84
End: 2019-06-18

## 2019-06-18 ENCOUNTER — APPOINTMENT (OUTPATIENT)
Dept: RADIOLOGY | Facility: CLINIC | Age: 84
End: 2019-06-18
Payer: MEDICARE

## 2019-06-18 ENCOUNTER — OUTPATIENT (OUTPATIENT)
Dept: OUTPATIENT SERVICES | Facility: HOSPITAL | Age: 84
LOS: 1 days | End: 2019-06-18
Payer: MEDICARE

## 2019-06-18 DIAGNOSIS — M54.5 LOW BACK PAIN: ICD-10-CM

## 2019-06-18 PROCEDURE — 73502 X-RAY EXAM HIP UNI 2-3 VIEWS: CPT

## 2019-06-18 PROCEDURE — 73502 X-RAY EXAM HIP UNI 2-3 VIEWS: CPT | Mod: 26,RT

## 2019-06-21 ENCOUNTER — APPOINTMENT (OUTPATIENT)
Dept: INTERNAL MEDICINE | Facility: CLINIC | Age: 84
End: 2019-06-21
Payer: MEDICARE

## 2019-06-21 VITALS
OXYGEN SATURATION: 95 % | HEART RATE: 55 BPM | DIASTOLIC BLOOD PRESSURE: 60 MMHG | BODY MASS INDEX: 22.97 KG/M2 | SYSTOLIC BLOOD PRESSURE: 110 MMHG | WEIGHT: 117 LBS | HEIGHT: 60 IN | RESPIRATION RATE: 14 BRPM

## 2019-06-21 DIAGNOSIS — M25.551 PAIN IN RIGHT HIP: ICD-10-CM

## 2019-06-21 PROCEDURE — 99213 OFFICE O/P EST LOW 20 MIN: CPT

## 2019-06-21 NOTE — END OF VISIT
[FreeTextEntry3] : All medical record entries made by the Scribe were at my, Dr. Henry's, direction and personally dictated by me on [6/21/2019]. I have reviewed the chart and agree that the record accurately reflects my personal performance of the history, physical exam, assessment and plan. I have also personally directed, reviewed and agreed with the chart.

## 2019-06-21 NOTE — ADDENDUM
[FreeTextEntry1] : I, Meghana Anton, acted solely as a scribe for Dr. Henry on this date [6/21/2019].

## 2019-06-21 NOTE — PHYSICAL EXAM
[No Acute Distress] : no acute distress [Well Nourished] : well nourished [Well-Appearing] : well-appearing [Well Developed] : well developed [No Respiratory Distress] : no respiratory distress  [No Accessory Muscle Use] : no accessory muscle use [Clear to Auscultation] : lungs were clear to auscultation bilaterally [Normal Rate] : normal rate  [Regular Rhythm] : with a regular rhythm [No Murmur] : no murmur heard [Normal S1, S2] : normal S1 and S2 [Normal Affect] : the affect was normal [Alert and Oriented x3] : oriented to person, place, and time [Normal Insight/Judgement] : insight and judgment were intact [No CVA Tenderness] : no CVA  tenderness

## 2019-06-21 NOTE — HISTORY OF PRESENT ILLNESS
[de-identified] : Patient presents for follow up from ER visit on 6/19/19. She is accompanied by her daughter. She had been treated for UTI with bactrim which culture showed was no sensitive. She had taken 1 dose of macrobid the day prior. She woke up with nausea. He had associated confusion, infrequent urination, headache and worsening right sided pain. She went to [facility] ER where she had a normal head CT and CT A/P. She was given a dose of ceftriaxone and sent home with Vantin for her UTI. She is now feeling back to baseline. Hip and back pain have resolved.

## 2019-06-21 NOTE — REVIEW OF SYSTEMS
[Nausea] : nausea [Headache] : headache [Back Pain] : back pain [Negative] : Heme/Lymph [FreeTextEntry8] : infrequent urination [de-identified] : confusion

## 2019-06-21 NOTE — ASSESSMENT
[FreeTextEntry1] : Patient is recovering well. Continue antibiotics as directed.\par Call if symptoms recur.

## 2019-07-01 ENCOUNTER — APPOINTMENT (OUTPATIENT)
Dept: INTERNAL MEDICINE | Facility: CLINIC | Age: 84
End: 2019-07-01
Payer: MEDICARE

## 2019-07-01 VITALS
OXYGEN SATURATION: 98 % | BODY MASS INDEX: 21.99 KG/M2 | SYSTOLIC BLOOD PRESSURE: 120 MMHG | WEIGHT: 112 LBS | HEIGHT: 60 IN | DIASTOLIC BLOOD PRESSURE: 68 MMHG | HEART RATE: 90 BPM | TEMPERATURE: 98.1 F | RESPIRATION RATE: 14 BRPM

## 2019-07-01 LAB
BILIRUB UR QL STRIP: NORMAL
GLUCOSE UR-MCNC: NORMAL
HCG UR QL: 0.2 EU/DL
HGB UR QL STRIP.AUTO: NORMAL
KETONES UR-MCNC: NORMAL
LEUKOCYTE ESTERASE UR QL STRIP: NORMAL
NITRITE UR QL STRIP: NORMAL
PH UR STRIP: 6
PROT UR STRIP-MCNC: NORMAL
SP GR UR STRIP: 1.01

## 2019-07-01 PROCEDURE — 99214 OFFICE O/P EST MOD 30 MIN: CPT | Mod: 25

## 2019-07-01 PROCEDURE — 81003 URINALYSIS AUTO W/O SCOPE: CPT | Mod: QW

## 2019-07-01 RX ORDER — CEPHALEXIN 500 MG/1
500 CAPSULE ORAL
Qty: 20 | Refills: 0 | Status: COMPLETED | COMMUNITY
Start: 2019-05-28 | End: 2019-07-11

## 2019-07-01 RX ORDER — NITROFURANTOIN (MONOHYDRATE/MACROCRYSTALS) 25; 75 MG/1; MG/1
100 CAPSULE ORAL TWICE DAILY
Qty: 6 | Refills: 0 | Status: DISCONTINUED | COMMUNITY
Start: 2019-06-18 | End: 2019-07-01

## 2019-07-01 NOTE — REVIEW OF SYSTEMS
[Constipation] : constipation [Dysuria] : dysuria [Fever] : no fever [Chills] : no chills [Nausea] : no nausea [Vomiting] : no vomiting [Hematuria] : no hematuria [FreeTextEntry8] : difficulty passing urine

## 2019-07-01 NOTE — END OF VISIT
[FreeTextEntry3] : All medical record entries made by the Scribe were at my, Dr. Henry's, direction and personally dictated by me on [7/1/2019]. I have reviewed the chart and agree that the record accurately reflects my personal performance of the history, physical exam, assessment and plan. I have also personally directed, reviewed and agreed with the chart.

## 2019-07-01 NOTE — ADDENDUM
[FreeTextEntry1] : I, Meghana Anton, acted solely as a scribe for Dr. Henry on this date [7/1/2019].

## 2019-07-01 NOTE — HISTORY OF PRESENT ILLNESS
[FreeTextEntry8] : Patient presents with dysuria for 1 day. She was in the bathroom from 3 am to 7 am this morning with difficulty passing urine. She denies nausea, vomiting, fever, chills, and hematuria. She had several other UTIs recently and finished her antibiotic course. Her UTIs have been recurring within 7-10 days after finishing her antibiotics.

## 2019-07-01 NOTE — PHYSICAL EXAM
[No Acute Distress] : no acute distress [Well Nourished] : well nourished [Well Developed] : well developed [Well-Appearing] : well-appearing [No Respiratory Distress] : no respiratory distress  [Clear to Auscultation] : lungs were clear to auscultation bilaterally [No Accessory Muscle Use] : no accessory muscle use [Normal Rate] : normal rate  [Regular Rhythm] : with a regular rhythm [Normal S1, S2] : normal S1 and S2 [No Murmur] : no murmur heard [Normal Affect] : the affect was normal [Alert and Oriented x3] : oriented to person, place, and time [Normal Insight/Judgement] : insight and judgment were intact [Soft] : abdomen soft [Non Tender] : non-tender [Non-distended] : non-distended [Normal Bowel Sounds] : normal bowel sounds [No CVA Tenderness] : no CVA  tenderness

## 2019-07-01 NOTE — ASSESSMENT
[FreeTextEntry1] : UA supports urinary tract infection.\par Urine culture sent out. Further recommendations based on results.\par Rx given for Keflex BID for 10 days. \par Discussed urinary hygiene, including wiping from back to front.\par Referred to urogynecology for recurrent UTIs.

## 2019-07-03 ENCOUNTER — RESULT REVIEW (OUTPATIENT)
Age: 84
End: 2019-07-03

## 2019-07-03 LAB — BACTERIA UR CULT: NORMAL

## 2019-07-22 ENCOUNTER — APPOINTMENT (OUTPATIENT)
Dept: INTERNAL MEDICINE | Facility: CLINIC | Age: 84
End: 2019-07-22
Payer: MEDICARE

## 2019-07-22 VITALS
DIASTOLIC BLOOD PRESSURE: 84 MMHG | TEMPERATURE: 98.1 F | SYSTOLIC BLOOD PRESSURE: 120 MMHG | OXYGEN SATURATION: 99 % | WEIGHT: 111 LBS | RESPIRATION RATE: 14 BRPM | HEART RATE: 78 BPM | BODY MASS INDEX: 21.79 KG/M2 | HEIGHT: 60 IN

## 2019-07-22 PROCEDURE — 99214 OFFICE O/P EST MOD 30 MIN: CPT | Mod: 25

## 2019-07-22 PROCEDURE — 81003 URINALYSIS AUTO W/O SCOPE: CPT | Mod: QW

## 2019-07-22 RX ORDER — CEFPODOXIME PROXETIL 200 MG/1
200 TABLET, FILM COATED ORAL
Qty: 20 | Refills: 0 | Status: COMPLETED | COMMUNITY
Start: 2019-07-22 | End: 2019-08-01

## 2019-07-22 NOTE — PHYSICAL EXAM
[No Acute Distress] : no acute distress [Well Nourished] : well nourished [Well Developed] : well developed [Well-Appearing] : well-appearing [No Respiratory Distress] : no respiratory distress  [No Accessory Muscle Use] : no accessory muscle use [Clear to Auscultation] : lungs were clear to auscultation bilaterally [Normal Rate] : normal rate  [Regular Rhythm] : with a regular rhythm [Normal S1, S2] : normal S1 and S2 [No Murmur] : no murmur heard [No Edema] : there was no peripheral edema [Non Tender] : non-tender [Normal Affect] : the affect was normal [Alert and Oriented x3] : oriented to person, place, and time [Normal Insight/Judgement] : insight and judgment were intact [Soft] : abdomen soft [Normal Bowel Sounds] : normal bowel sounds

## 2019-07-22 NOTE — ASSESSMENT
[FreeTextEntry1] : UA supports urinary tract infection.\par Urine culture sent out. Further recommendations based on results.\par Rx given for Vantin 200mg BID for 10 days.\par Rx given for ultrasound of kidneys and bladder.\par Follow up with urogynecology.\par Reschedule 3 month follow up visit after seeing Dr. Driscoll.

## 2019-07-22 NOTE — END OF VISIT
[FreeTextEntry3] : All medical record entries made by the Scribe were at my, Dr. Henry's, direction and personally dictated by me on [7/22/2019]. I have reviewed the chart and agree that the record accurately reflects my personal performance of the history, physical exam, assessment and plan. I have also personally directed, reviewed and agreed with the chart.

## 2019-07-22 NOTE — ADDENDUM
[FreeTextEntry1] : I, Meghana Anton, acted solely as a scribe for Dr. Henry on this date [7/22/2019].

## 2019-07-22 NOTE — REVIEW OF SYSTEMS
[Dysuria] : dysuria [Frequency] : frequency [Fever] : no fever [Abdominal Pain] : abdominal pain [Nausea] : no nausea [Vomiting] : no vomiting [FreeTextEntry8] : dark colored urine

## 2019-07-23 ENCOUNTER — APPOINTMENT (OUTPATIENT)
Dept: INTERNAL MEDICINE | Facility: CLINIC | Age: 84
End: 2019-07-23

## 2019-07-24 LAB — BACTERIA UR CULT: NORMAL

## 2019-08-01 ENCOUNTER — RX RENEWAL (OUTPATIENT)
Age: 84
End: 2019-08-01

## 2019-08-05 ENCOUNTER — APPOINTMENT (OUTPATIENT)
Dept: ULTRASOUND IMAGING | Facility: CLINIC | Age: 84
End: 2019-08-05

## 2019-08-06 ENCOUNTER — RX RENEWAL (OUTPATIENT)
Age: 84
End: 2019-08-06

## 2019-08-11 ENCOUNTER — FORM ENCOUNTER (OUTPATIENT)
Age: 84
End: 2019-08-11

## 2019-08-12 ENCOUNTER — OUTPATIENT (OUTPATIENT)
Dept: OUTPATIENT SERVICES | Facility: HOSPITAL | Age: 84
LOS: 1 days | End: 2019-08-12
Payer: MEDICARE

## 2019-08-12 ENCOUNTER — APPOINTMENT (OUTPATIENT)
Dept: ULTRASOUND IMAGING | Facility: CLINIC | Age: 84
End: 2019-08-12
Payer: MEDICARE

## 2019-08-12 DIAGNOSIS — N39.0 URINARY TRACT INFECTION, SITE NOT SPECIFIED: ICD-10-CM

## 2019-08-12 PROCEDURE — 76775 US EXAM ABDO BACK WALL LIM: CPT

## 2019-08-12 PROCEDURE — 76775 US EXAM ABDO BACK WALL LIM: CPT | Mod: 26

## 2019-08-15 ENCOUNTER — APPOINTMENT (OUTPATIENT)
Dept: UROGYNECOLOGY | Facility: CLINIC | Age: 84
End: 2019-08-15
Payer: MEDICARE

## 2019-08-15 VITALS
HEIGHT: 60 IN | DIASTOLIC BLOOD PRESSURE: 79 MMHG | WEIGHT: 112 LBS | SYSTOLIC BLOOD PRESSURE: 155 MMHG | BODY MASS INDEX: 21.99 KG/M2

## 2019-08-15 DIAGNOSIS — Z86.79 PERSONAL HISTORY OF OTHER DISEASES OF THE CIRCULATORY SYSTEM: ICD-10-CM

## 2019-08-15 LAB
BILIRUB UR QL STRIP: NEGATIVE
CLARITY UR: CLEAR
COLLECTION METHOD: NORMAL
GLUCOSE UR-MCNC: NEGATIVE
HCG UR QL: 0.2 EU/DL
HGB UR QL STRIP.AUTO: NORMAL
KETONES UR-MCNC: NEGATIVE
LEUKOCYTE ESTERASE UR QL STRIP: NORMAL
NITRITE UR QL STRIP: NEGATIVE
PH UR STRIP: 7
PROT UR STRIP-MCNC: NEGATIVE
SP GR UR STRIP: 1.01

## 2019-08-15 PROCEDURE — 81003 URINALYSIS AUTO W/O SCOPE: CPT | Mod: QW

## 2019-08-15 PROCEDURE — 99204 OFFICE O/P NEW MOD 45 MIN: CPT | Mod: 25

## 2019-08-15 PROCEDURE — 51701 INSERT BLADDER CATHETER: CPT

## 2019-08-15 NOTE — PROCEDURE
[FreeTextEntry1] : sterile straight catheterization performed to assess post void residual due to frequency

## 2019-08-15 NOTE — PHYSICAL EXAM
[Normal Gait] : gait was normal [No Acute Distress] : in no acute distress [Well developed] : well developed [Well Nourished] : ~L well nourished [Oriented x3] : oriented to person, place, and time [Normal Memory] : ~T memory was ~L unimpaired [Normal Mood/Affect] : mood and affect are normal [No Edema] : ~T edema was not present [Scar] : a scar was noted [Mid-line] : in the mid-line [None] : no CVA tenderness [Warm and Dry] : was warm and dry to touch [Vulvar Atrophy] : vulvar atrophy [Labia Majora] : were normal [Labia Minora] : were normal [Normal Appearance] : general appearance was normal [Atrophy] : atrophy [Uterine Adnexae] : were not tender and not enlarged [Normal] : no abnormalities [Post Void Residual ____ml] : post void residual via catheterization was [unfilled] ml [Cough] : no cough [Tenderness] : ~T no ~M abdominal tenderness observed [Inguinal LAD] : no adenopathy was noted in the inguinal lymph nodes [Distended] : not distended [FreeTextEntry3] : neg CST

## 2019-08-15 NOTE — HISTORY OF PRESENT ILLNESS
[FreeTextEntry1] : 87yo P3 referred by Dr. Henry for recurrent UTI from May -July 2019. Reports both culture positive and culture negative UTIs treated with Keflex and different cephalosporins. Patient reports urinary frequency and urge incontinence while she has a UTI. Reports back pain and nausea and subjective fevers whenever she thinks like she is getting a UTI again. Of note, has a history of complex bilateral renal cysts found on imaging since 2014 with no change with recent imaging. Drinks lots of water now but no leakage of urine with coughing/sneezing/laughing. Denies urinary symptoms when she does not have a UTI, prior to this episode rare UTIS. On daily colace for constipation but otherwise no changes in bowel movements now. No bulge. No nocturia. Not sexually active. \par \par renal sonogram with renal cyst with thick septation,previously noted on CT in 2014\par urine cx : 5/28: 100k ecoli, neg cx subsequent\par \par OBhx: CSx3, femoral hernia repair with mesh (2016, [facility])\par Gynhx: last LMP unknown, no abnormal paps\par Medhx: HTN, Stroke (2000), Hypothyroid, No colonoscopy ever?, Mammo WNL

## 2019-08-15 NOTE — CHIEF COMPLAINT
[Urine Frequency] : urine frequency [Poor Bladder Control] : poor bladder control [Recurrent Urinary Infections] : recurrent urinary infections [Questionnaire Received] : Patient questionnaire received

## 2019-08-15 NOTE — PHYSICAL EXAM
[Normal Gait] : gait was normal [No Acute Distress] : in no acute distress [Well developed] : well developed [Well Nourished] : ~L well nourished [Normal Memory] : ~T memory was ~L unimpaired [Oriented x3] : oriented to person, place, and time [Normal Mood/Affect] : mood and affect are normal [No Edema] : ~T edema was not present [Mid-line] : in the mid-line [Scar] : a scar was noted [None] : no CVA tenderness [Warm and Dry] : was warm and dry to touch [Vulvar Atrophy] : vulvar atrophy [Labia Majora] : were normal [Labia Minora] : were normal [Atrophy] : atrophy [Normal Appearance] : general appearance was normal [Normal] : no abnormalities [Uterine Adnexae] : were not tender and not enlarged [Post Void Residual ____ml] : post void residual via catheterization was [unfilled] ml [Cough] : no cough [Tenderness] : ~T no ~M abdominal tenderness observed [Inguinal LAD] : no adenopathy was noted in the inguinal lymph nodes [Distended] : not distended [FreeTextEntry3] : neg CST

## 2019-08-15 NOTE — CHIEF COMPLAINT
[Urine Frequency] : urine frequency [Poor Bladder Control] : poor bladder control [Questionnaire Received] : Patient questionnaire received [Recurrent Urinary Infections] : recurrent urinary infections

## 2019-08-15 NOTE — DISCUSSION/SUMMARY
[FreeTextEntry1] : \curry Agarwal presents with h/o of UTIs, she had one culture positive UTI in may with subsequent negative urine cx however with persistent symptoms and now with resolution of her symptoms after  treatment. On exam vaginal atrophy, no prolapse, normal pvr. She does have a stable renal cyst. We discussed etiology and management of recurrent urinary tract infections. We discussed behavioral modifications including increased oral intake, cranberry tablets, wiping front to back, d mannose. We discussed management of recurrent UTIs with vaginal estrogen and antibiotic prophylaxis. She is interested in vaginal estrogen, risks/benefits discussed and estrace sent to pharmacy.   We discussed that is she is symptomatic I prefer for catheterized specimen however if she cannot come to the office, discussed u/a and culture prior to antibiotic treatment. If continues to get UTI will get cystoscopy, however does not truly have recurrent UTIS due to 1 positive culture recently. IUGA handout on low dose estrogen given to her. All questions were answered.\par \par [] u/a, culture-- cysto if microheme\par [] estrace\par [] return in 2 months or sooner if symptomatic \par

## 2019-08-15 NOTE — HISTORY OF PRESENT ILLNESS
[FreeTextEntry1] : 85yo P3 referred by Dr. Henry for recurrent UTI from May -July 2019. Reports both culture positive and culture negative UTIs treated with Keflex and different cephalosporins. Patient reports urinary frequency and urge incontinence while she has a UTI. Reports back pain and nausea and subjective fevers whenever she thinks like she is getting a UTI again. Of note, has a history of complex bilateral renal cysts found on imaging since 2014 with no change with recent imaging. Drinks lots of water now but no leakage of urine with coughing/sneezing/laughing. Denies urinary symptoms when she does not have a UTI, prior to this episode rare UTIS. On daily colace for constipation but otherwise no changes in bowel movements now. No bulge. No nocturia. Not sexually active. \par \par renal sonogram with renal cyst with thick septation,previously noted on CT in 2014\par urine cx : 5/28: 100k ecoli, neg cx subsequent\par \par OBhx: CSx3, femoral hernia repair with mesh (2016, [facility])\par Gynhx: last LMP unknown, no abnormal paps\par Medhx: HTN, Stroke (2000), Hypothyroid, No colonoscopy ever?, Mammo WNL

## 2019-08-16 ENCOUNTER — RESULT REVIEW (OUTPATIENT)
Age: 84
End: 2019-08-16

## 2019-08-19 ENCOUNTER — RESULT REVIEW (OUTPATIENT)
Age: 84
End: 2019-08-19

## 2019-08-19 LAB
APPEARANCE: CLEAR
BACTERIA UR CULT: NORMAL
BACTERIA: NEGATIVE
BILIRUBIN URINE: NEGATIVE
BLOOD URINE: NEGATIVE
COLOR: NORMAL
GLUCOSE QUALITATIVE U: NEGATIVE
HYALINE CASTS: 0 /LPF
KETONES URINE: NEGATIVE
LEUKOCYTE ESTERASE URINE: NEGATIVE
MICROSCOPIC-UA: NORMAL
NITRITE URINE: NEGATIVE
PH URINE: 7.5
PROTEIN URINE: NEGATIVE
RED BLOOD CELLS URINE: 5 /HPF
SPECIFIC GRAVITY URINE: 1.01
SQUAMOUS EPITHELIAL CELLS: 0 /HPF
UROBILINOGEN URINE: NORMAL
WHITE BLOOD CELLS URINE: 2 /HPF

## 2019-09-03 ENCOUNTER — APPOINTMENT (OUTPATIENT)
Dept: INTERNAL MEDICINE | Facility: CLINIC | Age: 84
End: 2019-09-03
Payer: MEDICARE

## 2019-09-03 VITALS
HEART RATE: 76 BPM | WEIGHT: 113 LBS | SYSTOLIC BLOOD PRESSURE: 135 MMHG | DIASTOLIC BLOOD PRESSURE: 64 MMHG | HEIGHT: 60 IN | OXYGEN SATURATION: 97 % | BODY MASS INDEX: 22.19 KG/M2

## 2019-09-03 PROCEDURE — 99214 OFFICE O/P EST MOD 30 MIN: CPT | Mod: 25

## 2019-09-03 PROCEDURE — 36415 COLL VENOUS BLD VENIPUNCTURE: CPT

## 2019-09-03 NOTE — ASSESSMENT
[FreeTextEntry1] : Blood pressure is controlled. Continue current medications.\par Labs drawn in office. Further recommendations based on lab results. \par Current with specialists. \par She had Prevnar and Pneumovax.\par No cancer screening indicated given age.\par Follow up in 4 months.

## 2019-09-03 NOTE — PHYSICAL EXAM
[No Acute Distress] : no acute distress [Well Nourished] : well nourished [Well Developed] : well developed [Well-Appearing] : well-appearing [No Respiratory Distress] : no respiratory distress  [No Accessory Muscle Use] : no accessory muscle use [Clear to Auscultation] : lungs were clear to auscultation bilaterally [Regular Rhythm] : with a regular rhythm [Normal Rate] : normal rate  [Normal S1, S2] : normal S1 and S2 [No Edema] : there was no peripheral edema [No Murmur] : no murmur heard [Normal Affect] : the affect was normal [Normal Insight/Judgement] : insight and judgment were intact

## 2019-09-03 NOTE — HISTORY OF PRESENT ILLNESS
[de-identified] : Patient presents for follow up of hypertension on amlodipine and enalapril, hypothyroidism on Synthroid and GERD on omeprazole. She is accompanied by her daughter. She also has hyperlipidemia and prediabetes not on medication. History is significant for recurrent UTIs. She is now seeing urogyn. Recent cultures were negative. She has a cystoscopy scheduled for microscopic hematuria. She has been prescribed estradiol, but has not started it due to cost. She also complains of loss of appetite.

## 2019-09-03 NOTE — ADDENDUM
[FreeTextEntry1] : I, Meghana Anton, acted solely as a scribe for Dr. Henry on this date [9/3/2019].

## 2019-09-03 NOTE — END OF VISIT
[FreeTextEntry3] : All medical record entries made by the Scribe were at my, Dr. Henry's, direction and personally dictated by me on [9/3/2019]. I have reviewed the chart and agree that the record accurately reflects my personal performance of the history, physical exam, assessment and plan. I have also personally directed, reviewed and agreed with the chart.

## 2019-09-12 ENCOUNTER — APPOINTMENT (OUTPATIENT)
Age: 84
End: 2019-09-12
Payer: MEDICARE

## 2019-09-12 DIAGNOSIS — Z87.440 PERSONAL HISTORY OF URINARY (TRACT) INFECTIONS: ICD-10-CM

## 2019-09-12 DIAGNOSIS — N95.2 POSTMENOPAUSAL ATROPHIC VAGINITIS: ICD-10-CM

## 2019-09-12 LAB
BILIRUB UR QL STRIP: NEGATIVE
CLARITY UR: CLEAR
COLLECTION METHOD: NORMAL
GLUCOSE UR-MCNC: NEGATIVE
HCG UR QL: 1 EU/DL
HGB UR QL STRIP.AUTO: NORMAL
KETONES UR-MCNC: NEGATIVE
LEUKOCYTE ESTERASE UR QL STRIP: NEGATIVE
NITRITE UR QL STRIP: NEGATIVE
PH UR STRIP: 5.5
PROT UR STRIP-MCNC: NEGATIVE
SP GR UR STRIP: 1.01

## 2019-09-12 PROCEDURE — 99213 OFFICE O/P EST LOW 20 MIN: CPT | Mod: 25

## 2019-09-12 PROCEDURE — 81003 URINALYSIS AUTO W/O SCOPE: CPT | Mod: QW

## 2019-09-12 PROCEDURE — 52000 CYSTOURETHROSCOPY: CPT

## 2019-09-12 NOTE — DISCUSSION/SUMMARY
[FreeTextEntry1] : \curry Agarwal presents for f/u on MH and recurrent UTI. Negative workup, unsure if she wants to try estrace, will consider. return if symptomatic UTI or in 6 months.

## 2019-09-12 NOTE — HISTORY OF PRESENT ILLNESS
[FreeTextEntry1] : \curry Agarwal presents for followup for recurrent UTI and MH, negative cystoscopy today\par she denies UTIS since treatment for a UTI after last visit. \curry has not started vaginal estrogen\curry overall feels very well

## 2019-09-13 LAB
ALBUMIN SERPL ELPH-MCNC: 4.1 G/DL
ALP BLD-CCNC: 76 U/L
ALT SERPL-CCNC: 11 U/L
ANION GAP SERPL CALC-SCNC: 13 MMOL/L
AST SERPL-CCNC: 9 U/L
BASOPHILS # BLD AUTO: 0.12 K/UL
BASOPHILS NFR BLD AUTO: 2.1 %
BILIRUB SERPL-MCNC: 0.2 MG/DL
BUN SERPL-MCNC: 15 MG/DL
CALCIUM SERPL-MCNC: 9.4 MG/DL
CHLORIDE SERPL-SCNC: 104 MMOL/L
CHOLEST SERPL-MCNC: 196 MG/DL
CHOLEST/HDLC SERPL: 2.7 RATIO
CO2 SERPL-SCNC: 23 MMOL/L
CREAT SERPL-MCNC: 0.68 MG/DL
EOSINOPHIL # BLD AUTO: 0.34 K/UL
EOSINOPHIL NFR BLD AUTO: 5.8 %
ESTIMATED AVERAGE GLUCOSE: 117 MG/DL
FERRITIN SERPL-MCNC: 31 NG/ML
FOLATE SERPL-MCNC: 13.3 NG/ML
GLUCOSE SERPL-MCNC: 121 MG/DL
HBA1C MFR BLD HPLC: 5.7 %
HCT VFR BLD CALC: 36 %
HDLC SERPL-MCNC: 73 MG/DL
HGB BLD-MCNC: 10.8 G/DL
IMM GRANULOCYTES NFR BLD AUTO: 0.2 %
IRON SATN MFR SERPL: 14 %
IRON SERPL-MCNC: 44 UG/DL
LDLC SERPL CALC-MCNC: 107 MG/DL
LYMPHOCYTES # BLD AUTO: 2.03 K/UL
LYMPHOCYTES NFR BLD AUTO: 34.9 %
MAN DIFF?: NORMAL
MCHC RBC-ENTMCNC: 28.3 PG
MCHC RBC-ENTMCNC: 30 GM/DL
MCV RBC AUTO: 94.5 FL
MONOCYTES # BLD AUTO: 0.51 K/UL
MONOCYTES NFR BLD AUTO: 8.8 %
NEUTROPHILS # BLD AUTO: 2.81 K/UL
NEUTROPHILS NFR BLD AUTO: 48.2 %
PLATELET # BLD AUTO: 282 K/UL
POTASSIUM SERPL-SCNC: 4 MMOL/L
PROT SERPL-MCNC: 6.4 G/DL
RBC # BLD: 3.81 M/UL
RBC # FLD: 14.5 %
SODIUM SERPL-SCNC: 140 MMOL/L
T4 FREE SERPL-MCNC: 1.2 NG/DL
TIBC SERPL-MCNC: 318 UG/DL
TRIGL SERPL-MCNC: 78 MG/DL
TSH SERPL-ACNC: 1.7 UIU/ML
UIBC SERPL-MCNC: 274 UG/DL
VIT B12 SERPL-MCNC: 493 PG/ML
WBC # FLD AUTO: 5.82 K/UL

## 2019-09-17 ENCOUNTER — RESULT REVIEW (OUTPATIENT)
Age: 84
End: 2019-09-17

## 2019-09-27 ENCOUNTER — APPOINTMENT (OUTPATIENT)
Dept: UROGYNECOLOGY | Facility: CLINIC | Age: 84
End: 2019-09-27
Payer: MEDICARE

## 2019-09-27 VITALS
SYSTOLIC BLOOD PRESSURE: 176 MMHG | WEIGHT: 110 LBS | HEIGHT: 60 IN | BODY MASS INDEX: 21.6 KG/M2 | DIASTOLIC BLOOD PRESSURE: 71 MMHG

## 2019-09-27 LAB
BILIRUB UR QL STRIP: NEGATIVE
CLARITY UR: NORMAL
COLLECTION METHOD: NORMAL
GLUCOSE UR-MCNC: NEGATIVE
HCG UR QL: 0.2 EU/DL
HGB UR QL STRIP.AUTO: NORMAL
KETONES UR-MCNC: NEGATIVE
LEUKOCYTE ESTERASE UR QL STRIP: NORMAL
NITRITE UR QL STRIP: NEGATIVE
PH UR STRIP: 5.5
PROT UR STRIP-MCNC: NEGATIVE
SP GR UR STRIP: 1.01

## 2019-09-27 PROCEDURE — 81003 URINALYSIS AUTO W/O SCOPE: CPT | Mod: QW

## 2019-09-27 PROCEDURE — 51701 INSERT BLADDER CATHETER: CPT

## 2019-09-27 NOTE — PROCEDURE
[Straight Catheterization] : insertion of a straight catheter [Urinary Tract Infection] : a urinary tract infection [Patient] : the patient [___ Fr Straight Tip] : a [unfilled] in [place] straight tip catheter [None] : there were no complications with the catheter insertion [Culture] : culture [Clear] : clear [Urinalysis] : urinalysis [No Complications] : no complications [Post procedure instructions and information given] : Post procedure instructions and information were given and reviewed with patient. [Tolerated Well] : the patient tolerated the procedure well [0] : 0

## 2019-09-27 NOTE — DISCUSSION/SUMMARY
[FreeTextEntry1] : Daughter present for encounter.  Urine obtained with a catheter for accuracy and sent to the lab for testing.  Discussed cost and benefit of Generic Estradiol cream.  Will Rx Pyridium for symptomatic relief and if not covered, price compare AZO.  Will treat based on today's results.  Lengthy discussion about our preference to diagnose UTI with catheterized specimen and also the difference between a true UTI and symptoms that can mimic UTI and treatment options.  RTC X 4-6 weeks for reevaluation.

## 2019-09-27 NOTE — HISTORY OF PRESENT ILLNESS
[FreeTextEntry1] : This 87 yo woman presents with her daughter who is a retired nurse with C/O painful urination and frequency.  She was seen by Dr Parra 8/15/19 for recurrent UTI, often treated with Cephalosporins, - YAS, + UUI, occasional back pain and nausea with low grade fevers.  Cysto 9/12/19 WNL, US Bladder and Kidneys 8/12/19 Bladder WNL, Kidneys Right lower pole 3 cm cyst with thin septation, Left several cysts, upper pole 5-6 cm, lower 4-5.5 cm.  Estrogen cream Rx 9/12/19 but patient states that it was $65.00 and she didn't buy it.

## 2019-09-27 NOTE — CHIEF COMPLAINT
[Recurrent Urinary Infections] : recurrent urinary infections [Painful Urination] : painful urination

## 2019-09-30 LAB
APPEARANCE: CLEAR
BACTERIA UR CULT: NORMAL
BACTERIA: ABNORMAL
BILIRUBIN URINE: NEGATIVE
BLOOD URINE: ABNORMAL
COLOR: NORMAL
GLUCOSE QUALITATIVE U: NEGATIVE
HYALINE CASTS: 0 /LPF
KETONES URINE: NEGATIVE
LEUKOCYTE ESTERASE URINE: ABNORMAL
MICROSCOPIC-UA: NORMAL
NITRITE URINE: NEGATIVE
PH URINE: 6
PROTEIN URINE: NEGATIVE
RED BLOOD CELLS URINE: 5 /HPF
SPECIFIC GRAVITY URINE: 1.01
SQUAMOUS EPITHELIAL CELLS: 1 /HPF
UROBILINOGEN URINE: NORMAL
WHITE BLOOD CELLS URINE: 35 /HPF

## 2019-10-12 ENCOUNTER — MOBILE ON CALL (OUTPATIENT)
Age: 84
End: 2019-10-12

## 2019-10-16 ENCOUNTER — TRANSCRIPTION ENCOUNTER (OUTPATIENT)
Age: 84
End: 2019-10-16

## 2019-10-17 ENCOUNTER — APPOINTMENT (OUTPATIENT)
Dept: UROGYNECOLOGY | Facility: CLINIC | Age: 84
End: 2019-10-17

## 2019-10-23 ENCOUNTER — APPOINTMENT (OUTPATIENT)
Dept: INTERNAL MEDICINE | Facility: CLINIC | Age: 84
End: 2019-10-23
Payer: MEDICARE

## 2019-10-23 VITALS
BODY MASS INDEX: 21.79 KG/M2 | DIASTOLIC BLOOD PRESSURE: 68 MMHG | HEART RATE: 77 BPM | WEIGHT: 111 LBS | OXYGEN SATURATION: 97 % | SYSTOLIC BLOOD PRESSURE: 108 MMHG | HEIGHT: 60 IN | RESPIRATION RATE: 14 BRPM

## 2019-10-23 PROCEDURE — 99495 TRANSJ CARE MGMT MOD F2F 14D: CPT

## 2019-10-23 NOTE — PHYSICAL EXAM
[No Acute Distress] : no acute distress [Well Developed] : well developed [Well Nourished] : well nourished [Well-Appearing] : well-appearing [No Accessory Muscle Use] : no accessory muscle use [No Respiratory Distress] : no respiratory distress  [Normal Rate] : normal rate  [Regular Rhythm] : with a regular rhythm [Clear to Auscultation] : lungs were clear to auscultation bilaterally [Normal S1, S2] : normal S1 and S2 [No Murmur] : no murmur heard [No Edema] : there was no peripheral edema [Non Tender] : non-tender [Soft] : abdomen soft [Non-distended] : non-distended [No Masses] : no abdominal mass palpated [No HSM] : no HSM [Normal Bowel Sounds] : normal bowel sounds [Normal Affect] : the affect was normal [Normal Insight/Judgement] : insight and judgment were intact

## 2019-10-29 NOTE — ADDENDUM
[FreeTextEntry1] : I, Meghana Anton, acted solely as a scribe for Dr. Henry on this date [10/23/2019].

## 2019-10-29 NOTE — REVIEW OF SYSTEMS
[Chills] : chills [Diarrhea] : diarrhea [Back Pain] : back pain [Negative] : Heme/Lymph [Abdominal Pain] : no abdominal pain [Nausea] : no nausea [Vomiting] : no vomiting [FreeTextEntry2] : lack of appetite

## 2019-10-29 NOTE — HISTORY OF PRESENT ILLNESS
[Admitted on: ___] : The patient was admitted on [unfilled] [Post-hospitalization from ___ Hospital] : Post-hospitalization from [unfilled] Hospital [Discharged on ___] : discharged on [unfilled] [Discharge Summary] : discharge summary [Pertinent Labs] : pertinent labs [Radiology Findings] : radiology findings [Discharge Med List] : discharge medication list [Med Reconciliation] : medication reconciliation has been completed [Patient Contacted By: ____] : and contacted by [unfilled] [FreeTextEntry2] : Patient presents for follow up from recent hospitalization. She is accompanied by her daughter. She presented with sepsis and metabolic encephalopathy secondary to UTI.  She was discharged from the hospital on Vantin. She had been following up with Dr. Lala in urogynecology before she was admitted, but has not seen her since she was admitted. She has prescriptions for Estroven cream and Premarin but she has not started either. \par She had troponin leak in the hospital, so she was started on metoprolol. TTE was normal. She has been taking 12.5mg of lopressor BID, now off amlodipine, still on enalapril 5mg. BP at home has been low. \par She complains of low back pain. Also reports diarrhea x1 day, no abdominal pain, blood in stool.

## 2019-10-29 NOTE — END OF VISIT
[FreeTextEntry3] : All medical record entries made by the Scribe were at my, Dr. Henry's, direction and personally dictated by me on [10/23/2019]. I have reviewed the chart and agree that the record accurately reflects my personal performance of the history, physical exam, assessment and plan. I have also personally directed, reviewed and agreed with the chart.

## 2019-10-29 NOTE — ASSESSMENT
[FreeTextEntry1] : BP low, continue metoprolol 12.5mg BID, decrease enalapril to 2.5mg. Follow up with cards.\par Start Estroven or Premarin and f/u with urogyn. \par Recommended Salonpas patches for back pain.\par Diarrhea, likely from abx but given recent abx use and hospital admission, will send home with cup to check for C. Diff. \par Flu shot at next visit.

## 2019-11-14 ENCOUNTER — APPOINTMENT (OUTPATIENT)
Dept: INTERNAL MEDICINE | Facility: CLINIC | Age: 84
End: 2019-11-14

## 2019-11-22 ENCOUNTER — APPOINTMENT (OUTPATIENT)
Dept: INTERNAL MEDICINE | Facility: CLINIC | Age: 84
End: 2019-11-22
Payer: MEDICARE

## 2019-11-22 VITALS
HEIGHT: 60 IN | DIASTOLIC BLOOD PRESSURE: 76 MMHG | BODY MASS INDEX: 21.6 KG/M2 | WEIGHT: 110 LBS | OXYGEN SATURATION: 99 % | HEART RATE: 78 BPM | SYSTOLIC BLOOD PRESSURE: 128 MMHG

## 2019-11-22 PROCEDURE — 90662 IIV NO PRSV INCREASED AG IM: CPT

## 2019-11-22 PROCEDURE — 36415 COLL VENOUS BLD VENIPUNCTURE: CPT

## 2019-11-22 PROCEDURE — 99214 OFFICE O/P EST MOD 30 MIN: CPT | Mod: 25

## 2019-11-22 PROCEDURE — G0008: CPT

## 2019-11-22 RX ORDER — NITROFURANTOIN (MONOHYDRATE/MACROCRYSTALS) 25; 75 MG/1; MG/1
100 CAPSULE ORAL
Qty: 6 | Refills: 0 | Status: DISCONTINUED | COMMUNITY
Start: 2019-08-16 | End: 2019-11-22

## 2019-11-22 NOTE — ASSESSMENT
[FreeTextEntry1] : Blood pressure is controlled. Continue current medications.\par Labs drawn in office. Further recommendations based on lab results. \par Current with specialists. \par Flu vaccine given.\par She had Prevnar and Pneumovax.\par No cancer screening indicated given age.\par Follow up in 4 months.

## 2019-11-22 NOTE — PHYSICAL EXAM
[No Acute Distress] : no acute distress [Well Nourished] : well nourished [Well Developed] : well developed [Well-Appearing] : well-appearing [No Respiratory Distress] : no respiratory distress  [No Accessory Muscle Use] : no accessory muscle use [Clear to Auscultation] : lungs were clear to auscultation bilaterally [Normal Rate] : normal rate  [Regular Rhythm] : with a regular rhythm [Normal S1, S2] : normal S1 and S2 [No Murmur] : no murmur heard [No Edema] : there was no peripheral edema [Normal Affect] : the affect was normal [Normal Insight/Judgement] : insight and judgment were intact

## 2019-11-22 NOTE — HISTORY OF PRESENT ILLNESS
[de-identified] : Patient presents for follow up of hypertension on amlodipine and enalapril, hypothyroidism on Synthroid and GERD on omeprazole. She is accompanied by her granddaughter. She also has hyperlipidemia and prediabetes not on medication. History is significant for recurrent UTIs. She was recently hospitalized with urosepsis, now doing well. She leaked troponins, no EKG changes. She had cardiac PET last week, has cardio f/u in 2 weeks. She is seeing urogyn for recurrent UTIs. She has been prescribed estradiol, but has not started it due to cost.

## 2019-11-25 LAB
ALBUMIN SERPL ELPH-MCNC: 4.4 G/DL
ALP BLD-CCNC: 81 U/L
ALT SERPL-CCNC: 9 U/L
ANION GAP SERPL CALC-SCNC: 14 MMOL/L
AST SERPL-CCNC: 7 U/L
BILIRUB SERPL-MCNC: 0.3 MG/DL
BUN SERPL-MCNC: 15 MG/DL
CALCIUM SERPL-MCNC: 9.6 MG/DL
CHLORIDE SERPL-SCNC: 104 MMOL/L
CHOLEST SERPL-MCNC: 181 MG/DL
CHOLEST/HDLC SERPL: 2.7 RATIO
CO2 SERPL-SCNC: 21 MMOL/L
CREAT SERPL-MCNC: 0.74 MG/DL
ESTIMATED AVERAGE GLUCOSE: 114 MG/DL
GLUCOSE SERPL-MCNC: 87 MG/DL
HBA1C MFR BLD HPLC: 5.6 %
HDLC SERPL-MCNC: 67 MG/DL
LDLC SERPL CALC-MCNC: 102 MG/DL
POTASSIUM SERPL-SCNC: 4.4 MMOL/L
PROT SERPL-MCNC: 7 G/DL
SODIUM SERPL-SCNC: 139 MMOL/L
T4 FREE SERPL-MCNC: 1.4 NG/DL
TRIGL SERPL-MCNC: 58 MG/DL
TSH SERPL-ACNC: 1.71 UIU/ML

## 2019-12-02 ENCOUNTER — RX RENEWAL (OUTPATIENT)
Age: 84
End: 2019-12-02

## 2020-01-08 ENCOUNTER — APPOINTMENT (OUTPATIENT)
Dept: FAMILY MEDICINE | Facility: CLINIC | Age: 85
End: 2020-01-08

## 2020-02-28 ENCOUNTER — APPOINTMENT (OUTPATIENT)
Dept: FAMILY MEDICINE | Facility: CLINIC | Age: 85
End: 2020-02-28
Payer: MEDICARE

## 2020-02-28 VITALS
HEART RATE: 58 BPM | DIASTOLIC BLOOD PRESSURE: 84 MMHG | SYSTOLIC BLOOD PRESSURE: 163 MMHG | WEIGHT: 110 LBS | HEIGHT: 60 IN | OXYGEN SATURATION: 96 % | TEMPERATURE: 98 F | BODY MASS INDEX: 21.6 KG/M2

## 2020-02-28 VITALS — SYSTOLIC BLOOD PRESSURE: 140 MMHG | DIASTOLIC BLOOD PRESSURE: 80 MMHG

## 2020-02-28 PROCEDURE — 99203 OFFICE O/P NEW LOW 30 MIN: CPT

## 2020-02-28 RX ORDER — ESTRADIOL 0.1 MG/G
0.1 CREAM VAGINAL
Qty: 1 | Refills: 2 | Status: DISCONTINUED | COMMUNITY
Start: 2019-08-15 | End: 2020-02-28

## 2020-02-28 RX ORDER — CONJUGATED ESTROGENS 0.62 MG/G
0.62 CREAM VAGINAL
Qty: 1 | Refills: 2 | Status: DISCONTINUED | COMMUNITY
Start: 2019-09-27 | End: 2020-02-28

## 2020-02-28 RX ORDER — PHENAZOPYRIDINE HYDROCHLORIDE 200 MG/1
200 TABLET ORAL 3 TIMES DAILY
Qty: 15 | Refills: 1 | Status: DISCONTINUED | COMMUNITY
Start: 2019-09-27 | End: 2020-02-28

## 2020-02-28 NOTE — ASSESSMENT
[FreeTextEntry1] : 86 y/o F presents today to establish anew PCP;\par \par -Pt had complete blood tests done 11/19.\par \par HTN:\par -on enalapril \par -metoprolol 25 mg 1/2 tab bid.> change to metoprolol succinate 25 mg daily\par \par Hypothyroidism:\par -On levothyroxine\par \par GERD:\par -on omeprazole prn.\par \par Dyslipidemia/ Pre DM:\par -Diet control.\par \par No Cancer screening> due to age\par \par Immunizations> up to date\par \par recurrent UTI's\par -Seen by UROGYN  in the past\par \par Bursitis Knee:\par -Seen by Orthopedic, Dr loaiza.\par \par -Pt will f/up for annual physical.\par \par

## 2020-02-28 NOTE — HEALTH RISK ASSESSMENT
[Good] : ~his/her~  mood as  good [Yes] : Yes [No] : In the past 12 months have you used drugs other than those required for medical reasons? No [0] : 1) Little interest or pleasure doing things: Not at all (0) [No falls in past year] : Patient reported no falls in the past year [Patient declined mammogram] : Patient declined mammogram [Patient declined PAP Smear] : Patient declined PAP Smear [Patient declined colonoscopy] : Patient declined colonoscopy [Patient declined bone density test] : Patient declined bone density test [None] : None [Retired] : retired [] :  [# Of Children ___] : has [unfilled] children [Fully functional (bathing, dressing, toileting, transferring, walking, feeding)] : Fully functional (bathing, dressing, toileting, transferring, walking, feeding) [Feels Safe at Home] : Feels safe at home [Fully functional (using the telephone, shopping, preparing meals, housekeeping, doing laundry, using] : Fully functional and needs no help or supervision to perform IADLs (using the telephone, shopping, preparing meals, housekeeping, doing laundry, using transportation, managing medications and managing finances) [Smoke Detector] : smoke detector [Seat Belt] :  uses seat belt [Safety elements used in home] : safety elements used in home [With Patient/Caregiver] : With Patient/Caregiver [Designated Healthcare Proxy] : Designated healthcare proxy [Name: ___] : Health Care Proxy's Name: [unfilled]  [Relationship: ___] : Relationship: [unfilled] [] : No [de-identified] : walks, gardening, cleaning [de-identified] : rare [Handling Complex Tasks] : denies difficulty handling complex tasks [Language] : denies difficulty with language [Change in mental status noted] : No change in mental status noted [Reports changes in vision] : Reports no changes in vision [Reports changes in dental health] : Reports no changes in dental health [Reports changes in hearing] : Reports no changes in hearing [de-identified] : graddaughter [FreeTextEntry3] : 7 gradchildren [AdvancecareDate] : 2/28/20

## 2020-02-28 NOTE — PHYSICAL EXAM
[No Acute Distress] : no acute distress [Well Nourished] : well nourished [Well-Appearing] : well-appearing [Well Developed] : well developed [PERRL] : pupils equal round and reactive to light [EOMI] : extraocular movements intact [Normal Sclera/Conjunctiva] : normal sclera/conjunctiva [No JVD] : no jugular venous distention [Normal Oropharynx] : the oropharynx was normal [Normal Outer Ear/Nose] : the outer ears and nose were normal in appearance [No Lymphadenopathy] : no lymphadenopathy [No Respiratory Distress] : no respiratory distress  [Supple] : supple [Thyroid Normal, No Nodules] : the thyroid was normal and there were no nodules present [Normal Rate] : normal rate  [No Accessory Muscle Use] : no accessory muscle use [Clear to Auscultation] : lungs were clear to auscultation bilaterally [Regular Rhythm] : with a regular rhythm [Normal S1, S2] : normal S1 and S2 [No Murmur] : no murmur heard [No Carotid Bruits] : no carotid bruits [No Abdominal Bruit] : a ~M bruit was not heard ~T in the abdomen [No Varicosities] : no varicosities [No Edema] : there was no peripheral edema [Pedal Pulses Present] : the pedal pulses are present [No Palpable Aorta] : no palpable aorta [No Extremity Clubbing/Cyanosis] : no extremity clubbing/cyanosis [Soft] : abdomen soft [Non Tender] : non-tender [Non-distended] : non-distended [No Masses] : no abdominal mass palpated [Normal Bowel Sounds] : normal bowel sounds [No HSM] : no HSM [Normal Posterior Cervical Nodes] : no posterior cervical lymphadenopathy [Normal Anterior Cervical Nodes] : no anterior cervical lymphadenopathy [No CVA Tenderness] : no CVA  tenderness [No Spinal Tenderness] : no spinal tenderness [No Joint Swelling] : no joint swelling [No Rash] : no rash [Grossly Normal Strength/Tone] : grossly normal strength/tone [No Focal Deficits] : no focal deficits [Coordination Grossly Intact] : coordination grossly intact [Normal Gait] : normal gait [Deep Tendon Reflexes (DTR)] : deep tendon reflexes were 2+ and symmetric [Normal Affect] : the affect was normal [Normal Insight/Judgement] : insight and judgment were intact

## 2020-02-28 NOTE — HISTORY OF PRESENT ILLNESS
[Other: _____] : [unfilled] [de-identified] : Patient presents to establish anew PCP.\par Pt with hx hypertension on Metroprolol 1/2 tab bid,and enalapril, hypothyroidism on Synthroid and GERD on omeprazole. She is accompanied by her daughter. She also has hyperlipidemia and prediabetes not on medication. \par History is significant for recurrent UTIs. She was hospitalized with urosepsis 10/19, now doing well.\par Feeling well overall.\par States is difficult to brake metroprol pill in 1/2. \par  [FreeTextEntry1] : Establish anew PCP

## 2020-03-12 ENCOUNTER — APPOINTMENT (OUTPATIENT)
Age: 85
End: 2020-03-12

## 2020-05-18 ENCOUNTER — APPOINTMENT (OUTPATIENT)
Dept: FAMILY MEDICINE | Facility: CLINIC | Age: 85
End: 2020-05-18

## 2020-05-26 ENCOUNTER — RX RENEWAL (OUTPATIENT)
Age: 85
End: 2020-05-26

## 2020-08-06 ENCOUNTER — APPOINTMENT (OUTPATIENT)
Dept: FAMILY MEDICINE | Facility: CLINIC | Age: 85
End: 2020-08-06
Payer: MEDICARE

## 2020-08-06 VITALS
DIASTOLIC BLOOD PRESSURE: 78 MMHG | SYSTOLIC BLOOD PRESSURE: 191 MMHG | OXYGEN SATURATION: 97 % | HEIGHT: 60 IN | HEART RATE: 74 BPM | TEMPERATURE: 97.9 F | BODY MASS INDEX: 22.78 KG/M2 | WEIGHT: 116 LBS

## 2020-08-06 DIAGNOSIS — R73.03 PREDIABETES.: ICD-10-CM

## 2020-08-06 LAB — HBA1C MFR BLD HPLC: 5.6

## 2020-08-06 PROCEDURE — 36415 COLL VENOUS BLD VENIPUNCTURE: CPT

## 2020-08-06 PROCEDURE — 99214 OFFICE O/P EST MOD 30 MIN: CPT | Mod: 25

## 2020-08-06 RX ORDER — SULFAMETHOXAZOLE AND TRIMETHOPRIM 800; 160 MG/1; MG/1
800-160 TABLET ORAL TWICE DAILY
Qty: 6 | Refills: 0 | Status: DISCONTINUED | COMMUNITY
Start: 2020-04-08 | End: 2020-08-06

## 2020-08-06 NOTE — HISTORY OF PRESENT ILLNESS
[Other: _____] : [unfilled] [FreeTextEntry1] : f/up\par Meds refill [de-identified] : Patient presents for follow up of hypertension on amlodipine and enalapril, hypothyroidism on Synthroid and GERD on omeprazole. She is accompanied by her granddaughter. She also has hyperlipidemia and prediabetes not on medication. History is significant for recurrent UTIs. She was hospitalized with urosepsis last year, now doing well. \par She feels well overall. No acute compliants \par \par According to Granddaughter, pt's BP has been elevated since enalapril was reduce to 2.5mg.

## 2020-08-06 NOTE — ASSESSMENT
[FreeTextEntry1] : Blood pressure not well controlled. Increase enalapril to 5mg, continue metoprolol.\par Labs drawn in office. Further recommendations based on lab results. \par Current with specialists. \par She had Prevnar and Pneumovax.\par No cancer screening indicated given age.\par Follow up in 6 months or prn.

## 2020-08-06 NOTE — PHYSICAL EXAM
[Well Nourished] : well nourished [No Acute Distress] : no acute distress [Well Developed] : well developed [Well-Appearing] : well-appearing [No Respiratory Distress] : no respiratory distress  [No Accessory Muscle Use] : no accessory muscle use [Clear to Auscultation] : lungs were clear to auscultation bilaterally [Normal Rate] : normal rate  [Regular Rhythm] : with a regular rhythm [Normal S1, S2] : normal S1 and S2 [No Murmur] : no murmur heard [No Edema] : there was no peripheral edema [Normal Affect] : the affect was normal [Normal Insight/Judgement] : insight and judgment were intact

## 2020-08-07 LAB
BASOPHILS # BLD AUTO: 0.07 K/UL
BASOPHILS NFR BLD AUTO: 1.3 %
EOSINOPHIL # BLD AUTO: 0.17 K/UL
EOSINOPHIL NFR BLD AUTO: 3.2 %
FERRITIN SERPL-MCNC: 16 NG/ML
HCT VFR BLD CALC: 39.5 %
HGB BLD-MCNC: 11.9 G/DL
IMM GRANULOCYTES NFR BLD AUTO: 0.2 %
LYMPHOCYTES # BLD AUTO: 1.7 K/UL
LYMPHOCYTES NFR BLD AUTO: 32.3 %
MAN DIFF?: NORMAL
MCHC RBC-ENTMCNC: 28.5 PG
MCHC RBC-ENTMCNC: 30.1 GM/DL
MCV RBC AUTO: 94.5 FL
MONOCYTES # BLD AUTO: 0.37 K/UL
MONOCYTES NFR BLD AUTO: 7 %
NEUTROPHILS # BLD AUTO: 2.95 K/UL
NEUTROPHILS NFR BLD AUTO: 56 %
PLATELET # BLD AUTO: 240 K/UL
RBC # BLD: 4.18 M/UL
RBC # FLD: 13.8 %
TSH SERPL-ACNC: 1.19 UIU/ML
WBC # FLD AUTO: 5.27 K/UL

## 2020-08-10 LAB
ALBUMIN SERPL ELPH-MCNC: 4.6 G/DL
ALP BLD-CCNC: 73 U/L
ALT SERPL-CCNC: 12 U/L
ANION GAP SERPL CALC-SCNC: 12 MMOL/L
AST SERPL-CCNC: 10 U/L
BILIRUB SERPL-MCNC: 0.2 MG/DL
BUN SERPL-MCNC: 21 MG/DL
CALCIUM SERPL-MCNC: 10 MG/DL
CHLORIDE SERPL-SCNC: 106 MMOL/L
CO2 SERPL-SCNC: 26 MMOL/L
CREAT SERPL-MCNC: 0.69 MG/DL
GLUCOSE SERPL-MCNC: 87 MG/DL
POTASSIUM SERPL-SCNC: 4.6 MMOL/L
PROT SERPL-MCNC: 6.9 G/DL
SODIUM SERPL-SCNC: 143 MMOL/L

## 2020-11-09 ENCOUNTER — RESULT CHARGE (OUTPATIENT)
Age: 85
End: 2020-11-09

## 2020-11-09 ENCOUNTER — APPOINTMENT (OUTPATIENT)
Age: 85
End: 2020-11-09
Payer: MEDICARE

## 2020-11-09 VITALS — TEMPERATURE: 97.2 F

## 2020-11-09 DIAGNOSIS — R10.2 PELVIC AND PERINEAL PAIN: ICD-10-CM

## 2020-11-09 LAB
BILIRUB UR QL STRIP: NEGATIVE
CLARITY UR: NORMAL
COLLECTION METHOD: NORMAL
GLUCOSE UR-MCNC: NEGATIVE
HCG UR QL: 0.2 EU/DL
HGB UR QL STRIP.AUTO: NORMAL
KETONES UR-MCNC: NEGATIVE
LEUKOCYTE ESTERASE UR QL STRIP: NORMAL
NITRITE UR QL STRIP: POSITIVE
PH UR STRIP: 5.5
PROT UR STRIP-MCNC: NEGATIVE
SP GR UR STRIP: 1.01

## 2020-11-09 PROCEDURE — 99214 OFFICE O/P EST MOD 30 MIN: CPT | Mod: 25

## 2020-11-09 PROCEDURE — 51701 INSERT BLADDER CATHETER: CPT

## 2020-11-09 NOTE — HISTORY OF PRESENT ILLNESS
[FreeTextEntry1] : \curry Agarwal presents for f/u on recurrent UTIs, she has no recent UTIs, she reports having pelvic pressure X 2 months, she denies frequency/urgency, unsure if UTI, no nausea/emesis, no fevers/chills, has not seen GYN in many years, no gross hematuria, she was given vaginal estrogen in the past but never took it due to cost

## 2020-11-09 NOTE — DISCUSSION/SUMMARY
[FreeTextEntry1] : \par Any presents with pelvic pressure, unsure if UTI today, no recent imaging. Severe atrophy on exam, again discussed vaginal estrogen and risks/benefits and she will try, discussed pelvic sonogram due to pressure, u/a and culture, bactrim given but unsure if will take, pyridium PRN and return in 6 months. all questions were answered.\par \par [] u/a cx\par [] bactrim for UTI symptoms (may wait for final cx results), Pyridium for relief \par [] pelvic us\par [] estrace \par [] return in 6 months

## 2020-11-09 NOTE — PHYSICAL EXAM
[Chaperone Present] : A chaperone was present in the examining room during all aspects of the physical examination [FreeTextEntry1] : General: Well, appearing. Alert and orientated. No acute distress\par HEENT: Normocephalic, atraumatic and extraocular muscles appear to be intact \par Neck: Full range of motion, no obvious lymphadenopathy, deformities, or masses noted \par Respiratory: Speaking in full sentences comfortably, normal work of breathing and no cough during visit\par Musculoskeletal: active full range of motion in extremities \par Extremities: No upper extremity edema noted\par Skin: no obvious rash or skin lesions\par Neuro: Orientated X 3, speech is fluent, normal rate\par Psych: Normal mood and affect \par \par  [Tenderness] : ~T no ~M abdominal tenderness observed [Distended] : not distended [Inguinal LAD] : no adenopathy was noted in the inguinal lymph nodes [Warm and Dry] : was warm and dry to touch [Normal Gait] : gait was normal [Labia Majora] : were normal [Labia Minora] : were normal [Atrophy] : atrophy [No Bleeding] : there was no active vaginal bleeding [Normal] : was normal [2] : 2 [FreeTextEntry4] : small GH, unable to insert speculum,  [de-identified] : no prolapse

## 2020-11-10 LAB
APPEARANCE: CLEAR
BACTERIA: ABNORMAL
BILIRUBIN URINE: NEGATIVE
BLOOD URINE: NORMAL
COLOR: NORMAL
GLUCOSE QUALITATIVE U: NEGATIVE
HYALINE CASTS: 0 /LPF
KETONES URINE: NEGATIVE
LEUKOCYTE ESTERASE URINE: ABNORMAL
MICROSCOPIC-UA: NORMAL
NITRITE URINE: NEGATIVE
PH URINE: 6
PROTEIN URINE: NEGATIVE
RED BLOOD CELLS URINE: 1 /HPF
SPECIFIC GRAVITY URINE: 1.01
SQUAMOUS EPITHELIAL CELLS: 3 /HPF
UROBILINOGEN URINE: NORMAL
WHITE BLOOD CELLS URINE: 16 /HPF

## 2020-11-12 ENCOUNTER — RX RENEWAL (OUTPATIENT)
Age: 85
End: 2020-11-12

## 2020-11-12 LAB — BACTERIA UR CULT: ABNORMAL

## 2020-11-23 ENCOUNTER — RX RENEWAL (OUTPATIENT)
Age: 85
End: 2020-11-23

## 2021-02-02 ENCOUNTER — RX RENEWAL (OUTPATIENT)
Age: 86
End: 2021-02-02

## 2021-02-05 ENCOUNTER — RESULT CHARGE (OUTPATIENT)
Age: 86
End: 2021-02-05

## 2021-02-05 ENCOUNTER — APPOINTMENT (OUTPATIENT)
Dept: UROGYNECOLOGY | Facility: CLINIC | Age: 86
End: 2021-02-05
Payer: MEDICARE

## 2021-02-05 LAB
BILIRUB UR QL STRIP: NEGATIVE
CLARITY UR: CLEAR
COLLECTION METHOD: NORMAL
GLUCOSE UR-MCNC: NEGATIVE
HCG UR QL: 0.2 EU/DL
HGB UR QL STRIP.AUTO: NORMAL
KETONES UR-MCNC: NEGATIVE
LEUKOCYTE ESTERASE UR QL STRIP: NEGATIVE
NITRITE UR QL STRIP: NEGATIVE
PH UR STRIP: 5
PROT UR STRIP-MCNC: NEGATIVE
SP GR UR STRIP: 1.02

## 2021-02-05 PROCEDURE — 51701 INSERT BLADDER CATHETER: CPT

## 2021-02-05 PROCEDURE — 99213 OFFICE O/P EST LOW 20 MIN: CPT | Mod: 25

## 2021-02-05 RX ORDER — FLUCONAZOLE 150 MG/1
150 TABLET ORAL DAILY
Qty: 1 | Refills: 0 | Status: DISCONTINUED | COMMUNITY
Start: 2020-11-12 | End: 2021-02-05

## 2021-02-05 RX ORDER — CEPHALEXIN 500 MG/1
500 CAPSULE ORAL
Qty: 14 | Refills: 0 | Status: DISCONTINUED | COMMUNITY
Start: 2020-11-12 | End: 2021-02-05

## 2021-02-05 NOTE — PROCEDURE
[Straight Catheterization] : insertion of a straight catheter [Urinary Frequency] : urinary frequency [Patient] : the patient [___ Fr Straight Tip] : a [unfilled] in [place] straight tip catheter [None] : there were no complications with the catheter insertion [Cloudy] : cloudy [Culture] : culture [Urinalysis] : urinalysis [No Complications] : no complications [Tolerated Well] : the patient tolerated the procedure well [Post procedure instructions and information given] : Post procedure instructions and information were given and reviewed with patient. [0] : 0

## 2021-02-05 NOTE — DISCUSSION/SUMMARY
[FreeTextEntry1] : UA CS sent.  WIll treat empirically with bactrim.  Discussed the role of vaginal estrogen in UTI prevention.  She does have this at home and I advised that she start tonight.  Pt and her daughter agree. Advised adequate fluid intake of at least 40 oz water/day. Will call pts daughter Maryann at [phone] with culture results.  Instructed to call with any questions or concerns and she verbalizes understanding.

## 2021-02-05 NOTE — HISTORY OF PRESENT ILLNESS
[FreeTextEntry1] : Pt presents to office for c/o dysuria, increased urgency and frequency x "a few days".  She was seen 11/9 for recurrent UTI and treated with keflex for > 100K ecoli UTI at that time.  She was advised to start vaginal estrogen but she did not do this.\par \par She called on 1/22 with similar complaints but was unable to get to office.  She was given a course of keflex at that time.  States her symptoms did resolve with treatment, but then returned shortly after.  \par \par She denies any blood in the urine, back pain, fever or chills.  She was unable to tell me exactly how much water she is drinking daily.\par \par She had neg cysto 9/2019.  Renal sono done 8/2019 showing left renal cyst.

## 2021-02-05 NOTE — END OF VISIT
[FreeTextEntry3] : I have reviewed and agree with the nurse the findings and edited where appropriate\par

## 2021-02-08 LAB
APPEARANCE: ABNORMAL
BACTERIA: ABNORMAL
BILIRUBIN URINE: NEGATIVE
BLOOD URINE: ABNORMAL
COLOR: YELLOW
GLUCOSE QUALITATIVE U: NEGATIVE
HYALINE CASTS: 0 /LPF
KETONES URINE: NEGATIVE
LEUKOCYTE ESTERASE URINE: ABNORMAL
MICROSCOPIC-UA: NORMAL
NITRITE URINE: NEGATIVE
PH URINE: 5.5
PROTEIN URINE: NORMAL
RED BLOOD CELLS URINE: 3 /HPF
SPECIFIC GRAVITY URINE: 1.02
SQUAMOUS EPITHELIAL CELLS: 4 /HPF
UROBILINOGEN URINE: NORMAL
WHITE BLOOD CELLS URINE: 120 /HPF

## 2021-02-12 ENCOUNTER — RX RENEWAL (OUTPATIENT)
Age: 86
End: 2021-02-12

## 2021-03-09 ENCOUNTER — RX RENEWAL (OUTPATIENT)
Age: 86
End: 2021-03-09

## 2021-04-26 ENCOUNTER — APPOINTMENT (OUTPATIENT)
Dept: FAMILY MEDICINE | Facility: CLINIC | Age: 86
End: 2021-04-26
Payer: MEDICARE

## 2021-04-26 VITALS
TEMPERATURE: 97.2 F | SYSTOLIC BLOOD PRESSURE: 130 MMHG | WEIGHT: 118 LBS | DIASTOLIC BLOOD PRESSURE: 78 MMHG | RESPIRATION RATE: 16 BRPM | HEIGHT: 60 IN | BODY MASS INDEX: 23.16 KG/M2 | OXYGEN SATURATION: 98 % | HEART RATE: 78 BPM

## 2021-04-26 DIAGNOSIS — Z86.59 PERSONAL HISTORY OF OTHER MENTAL AND BEHAVIORAL DISORDERS: ICD-10-CM

## 2021-04-26 DIAGNOSIS — S93.402A SPRAIN OF UNSPECIFIED LIGAMENT OF LEFT ANKLE, INITIAL ENCOUNTER: ICD-10-CM

## 2021-04-26 DIAGNOSIS — Z92.29 PERSONAL HISTORY OF OTHER DRUG THERAPY: ICD-10-CM

## 2021-04-26 DIAGNOSIS — R31.29 OTHER MICROSCOPIC HEMATURIA: ICD-10-CM

## 2021-04-26 DIAGNOSIS — Z09 ENCOUNTER FOR FOLLOW-UP EXAMINATION AFTER COMPLETED TREATMENT FOR CONDITIONS OTHER THAN MALIGNANT NEOPLASM: ICD-10-CM

## 2021-04-26 DIAGNOSIS — R07.89 OTHER CHEST PAIN: ICD-10-CM

## 2021-04-26 DIAGNOSIS — R65.20 SEPSIS, UNSPECIFIED ORGANISM: ICD-10-CM

## 2021-04-26 DIAGNOSIS — Z13.820 ENCOUNTER FOR SCREENING FOR OSTEOPOROSIS: ICD-10-CM

## 2021-04-26 DIAGNOSIS — K40.90 UNILATERAL INGUINAL HERNIA, W/OUT OBSTRUCTION OR GANGRENE, NOT SPECIFIED AS RECURRENT: ICD-10-CM

## 2021-04-26 DIAGNOSIS — G93.41 SEPSIS, UNSPECIFIED ORGANISM: ICD-10-CM

## 2021-04-26 DIAGNOSIS — M70.50 OTHER BURSITIS OF KNEE, UNSPECIFIED KNEE: ICD-10-CM

## 2021-04-26 DIAGNOSIS — A41.9 SEPSIS, UNSPECIFIED ORGANISM: ICD-10-CM

## 2021-04-26 DIAGNOSIS — Z87.898 PERSONAL HISTORY OF OTHER SPECIFIED CONDITIONS: ICD-10-CM

## 2021-04-26 DIAGNOSIS — R13.10 DYSPHAGIA, UNSPECIFIED: ICD-10-CM

## 2021-04-26 DIAGNOSIS — Z76.89 PERSONS ENCOUNTERING HEALTH SERVICES IN OTHER SPECIFIED CIRCUMSTANCES: ICD-10-CM

## 2021-04-26 DIAGNOSIS — R77.8 OTHER SPECIFIED ABNORMALITIES OF PLASMA PROTEINS: ICD-10-CM

## 2021-04-26 DIAGNOSIS — R39.9 UNSPECIFIED SYMPTOMS AND SIGNS INVOLVING THE GENITOURINARY SYSTEM: ICD-10-CM

## 2021-04-26 PROCEDURE — 99212 OFFICE O/P EST SF 10 MIN: CPT

## 2021-04-26 RX ORDER — METOPROLOL TARTRATE 25 MG/1
25 TABLET, FILM COATED ORAL TWICE DAILY
Qty: 90 | Refills: 1 | Status: COMPLETED | COMMUNITY
Start: 2019-12-03 | End: 2021-04-26

## 2021-04-26 RX ORDER — SULFAMETHOXAZOLE AND TRIMETHOPRIM 800; 160 MG/1; MG/1
800-160 TABLET ORAL TWICE DAILY
Qty: 6 | Refills: 0 | Status: COMPLETED | COMMUNITY
Start: 2020-11-09 | End: 2021-04-26

## 2021-04-26 NOTE — HEALTH RISK ASSESSMENT
[No] : In the past 12 months have you used drugs other than those required for medical reasons? No [Any fall with injury in past year] : Patient reported fall with injury in the past year [0] : 2) Feeling down, depressed, or hopeless: Not at all (0) [] : No [Audit-CScore] : 0 [[id number]] : 0

## 2021-04-26 NOTE — ASSESSMENT
[FreeTextEntry1] : 87 y/o female presenting for medication refills, last time seen 8/2020 with Dr Persaud\par \par HTN\par -BP optimal\par -Continue Chlorthalidone, Enalapril, Rx refilled.\par -30 minutes walk 5 x a week recommended\par \par ENDO: h/o Hypothyroid\par -Continue Levothyroxine 50 mcg daily, Rx refilled\par \par HCM:\par -RTO for CPE with Dr Persaud\par -Pfizer Covid vaccine x 2 doses 4/1/21, 4/22/21.

## 2021-04-26 NOTE — HISTORY OF PRESENT ILLNESS
[Other: _____] : [unfilled] [FreeTextEntry1] : medication refills. [de-identified] : Pt presents for medication refills, no acute complains, presents with grand daughter.

## 2021-05-04 ENCOUNTER — APPOINTMENT (OUTPATIENT)
Dept: UROGYNECOLOGY | Facility: CLINIC | Age: 86
End: 2021-05-04

## 2021-05-12 ENCOUNTER — APPOINTMENT (OUTPATIENT)
Dept: FAMILY MEDICINE | Facility: CLINIC | Age: 86
End: 2021-05-12
Payer: MEDICARE

## 2021-05-12 VITALS
SYSTOLIC BLOOD PRESSURE: 112 MMHG | OXYGEN SATURATION: 99 % | TEMPERATURE: 97.3 F | HEIGHT: 72 IN | BODY MASS INDEX: 16.12 KG/M2 | DIASTOLIC BLOOD PRESSURE: 76 MMHG | RESPIRATION RATE: 16 BRPM | WEIGHT: 119 LBS | HEART RATE: 67 BPM

## 2021-05-12 PROCEDURE — 36415 COLL VENOUS BLD VENIPUNCTURE: CPT

## 2021-05-12 PROCEDURE — 99214 OFFICE O/P EST MOD 30 MIN: CPT | Mod: 25

## 2021-05-12 RX ORDER — DICLOFENAC SODIUM 10 MG/G
1 GEL TOPICAL
Qty: 1 | Refills: 1 | Status: DISCONTINUED | COMMUNITY
Start: 2018-02-13 | End: 2021-05-12

## 2021-05-13 LAB
25(OH)D3 SERPL-MCNC: 45.1 NG/ML
ALBUMIN SERPL ELPH-MCNC: 4.2 G/DL
ALP BLD-CCNC: 77 U/L
ALT SERPL-CCNC: 10 U/L
ANION GAP SERPL CALC-SCNC: 12 MMOL/L
AST SERPL-CCNC: 9 U/L
BASOPHILS # BLD AUTO: 0.1 K/UL
BASOPHILS NFR BLD AUTO: 1.4 %
BILIRUB SERPL-MCNC: 0.2 MG/DL
BUN SERPL-MCNC: 20 MG/DL
CALCIUM SERPL-MCNC: 9.5 MG/DL
CHLORIDE SERPL-SCNC: 108 MMOL/L
CHOLEST SERPL-MCNC: 187 MG/DL
CO2 SERPL-SCNC: 23 MMOL/L
CREAT SERPL-MCNC: 0.75 MG/DL
EOSINOPHIL # BLD AUTO: 0.18 K/UL
EOSINOPHIL NFR BLD AUTO: 2.5 %
ESTIMATED AVERAGE GLUCOSE: 120 MG/DL
FOLATE SERPL-MCNC: 16.2 NG/ML
GLUCOSE SERPL-MCNC: 76 MG/DL
HBA1C MFR BLD HPLC: 5.8 %
HCT VFR BLD CALC: 38.9 %
HDLC SERPL-MCNC: 68 MG/DL
HGB BLD-MCNC: 11.9 G/DL
IMM GRANULOCYTES NFR BLD AUTO: 0.3 %
LDLC SERPL CALC-MCNC: 101 MG/DL
LYMPHOCYTES # BLD AUTO: 1.88 K/UL
LYMPHOCYTES NFR BLD AUTO: 26.4 %
MAN DIFF?: NORMAL
MCHC RBC-ENTMCNC: 28.8 PG
MCHC RBC-ENTMCNC: 30.6 GM/DL
MCV RBC AUTO: 94.2 FL
MONOCYTES # BLD AUTO: 0.58 K/UL
MONOCYTES NFR BLD AUTO: 8.2 %
NEUTROPHILS # BLD AUTO: 4.35 K/UL
NEUTROPHILS NFR BLD AUTO: 61.2 %
NONHDLC SERPL-MCNC: 119 MG/DL
PLATELET # BLD AUTO: 247 K/UL
POTASSIUM SERPL-SCNC: 4.2 MMOL/L
PROT SERPL-MCNC: 6.6 G/DL
RBC # BLD: 4.13 M/UL
RBC # FLD: 13.9 %
SODIUM SERPL-SCNC: 143 MMOL/L
TRIGL SERPL-MCNC: 90 MG/DL
TSH SERPL-ACNC: 1.93 UIU/ML
VIT B12 SERPL-MCNC: 395 PG/ML
WBC # FLD AUTO: 7.11 K/UL

## 2021-05-14 NOTE — HISTORY OF PRESENT ILLNESS
[Other: _____] : [unfilled] [FreeTextEntry1] : f/up\par Meds refill [de-identified] : Patient presents for follow up of hypertension on amlodipine and enalapril, hypothyroidism on Synthroid and GERD on omeprazole. She is accompanied by her granddaughter. She also has hyperlipidemia and prediabetes not on medication. History is significant for recurrent UTIs. She was hospitalized with urosepsis last year, now doing well. \par She feels well overall. No acute compliants \par \par According to Granddaughter, pt's BP has been elevated since enalapril was reduce to 2.5mg.

## 2021-05-14 NOTE — ASSESSMENT
[FreeTextEntry1] : Blood pressure not well controlled. Increase enalapril to 5mg, continue metoprolol.\par Labs drawn in office. Further recommendations based on lab results. \par Current with specialists. \par She had Prevnar and Pneumovax.\par No cancer screening indicated given age.\par Follow up in 6 months or prn. throat

## 2021-05-14 NOTE — HISTORY OF PRESENT ILLNESS
[Other: _____] : [unfilled] [FreeTextEntry1] : f/up\par Meds refill [de-identified] : Patient presents for follow up of hypertension on amlodipine and enalapril, hypothyroidism on Synthroid and GERD on omeprazole. She is accompanied by her granddaughter. She also has hyperlipidemia and prediabetes not on medication. History is significant for recurrent UTIs. She was hospitalized with urosepsis last year, now doing well. \par She feels well overall. No acute compliants \par \par According to Granddaughter, pt's BP has been elevated since enalapril was reduce to 2.5mg.

## 2021-06-28 ENCOUNTER — RX RENEWAL (OUTPATIENT)
Age: 86
End: 2021-06-28

## 2021-07-05 ENCOUNTER — TRANSCRIPTION ENCOUNTER (OUTPATIENT)
Age: 86
End: 2021-07-05

## 2021-07-06 ENCOUNTER — EMERGENCY (EMERGENCY)
Facility: HOSPITAL | Age: 86
LOS: 1 days | Discharge: HOME | End: 2021-07-06
Attending: EMERGENCY MEDICINE
Payer: MEDICARE

## 2021-07-06 ENCOUNTER — NON-APPOINTMENT (OUTPATIENT)
Age: 86
End: 2021-07-06

## 2021-07-06 VITALS
SYSTOLIC BLOOD PRESSURE: 148 MMHG | DIASTOLIC BLOOD PRESSURE: 83 MMHG | RESPIRATION RATE: 18 BRPM | HEART RATE: 96 BPM | WEIGHT: 115.96 LBS | HEIGHT: 60 IN | OXYGEN SATURATION: 98 % | TEMPERATURE: 99 F

## 2021-07-06 LAB
ALBUMIN SERPL ELPH-MCNC: 4.4 G/DL — SIGNIFICANT CHANGE UP (ref 3.3–5.2)
ALP SERPL-CCNC: 86 U/L — SIGNIFICANT CHANGE UP (ref 40–120)
ALT FLD-CCNC: 17 U/L — SIGNIFICANT CHANGE UP
ANION GAP SERPL CALC-SCNC: 12 MMOL/L — SIGNIFICANT CHANGE UP (ref 5–17)
APPEARANCE UR: CLEAR — SIGNIFICANT CHANGE UP
AST SERPL-CCNC: 33 U/L — HIGH
BACTERIA # UR AUTO: ABNORMAL
BASOPHILS # BLD AUTO: 0.07 K/UL — SIGNIFICANT CHANGE UP (ref 0–0.2)
BASOPHILS NFR BLD AUTO: 0.6 % — SIGNIFICANT CHANGE UP (ref 0–2)
BILIRUB SERPL-MCNC: 0.7 MG/DL — SIGNIFICANT CHANGE UP (ref 0.4–2)
BILIRUB UR-MCNC: NEGATIVE — SIGNIFICANT CHANGE UP
BUN SERPL-MCNC: 10.7 MG/DL — SIGNIFICANT CHANGE UP (ref 8–20)
CALCIUM SERPL-MCNC: 9.6 MG/DL — SIGNIFICANT CHANGE UP (ref 8.6–10.2)
CHLORIDE SERPL-SCNC: 101 MMOL/L — SIGNIFICANT CHANGE UP (ref 98–107)
CO2 SERPL-SCNC: 26 MMOL/L — SIGNIFICANT CHANGE UP (ref 22–29)
COLOR SPEC: YELLOW — SIGNIFICANT CHANGE UP
CREAT SERPL-MCNC: 0.64 MG/DL — SIGNIFICANT CHANGE UP (ref 0.5–1.3)
DIFF PNL FLD: ABNORMAL
EOSINOPHIL # BLD AUTO: 0.04 K/UL — SIGNIFICANT CHANGE UP (ref 0–0.5)
EOSINOPHIL NFR BLD AUTO: 0.4 % — SIGNIFICANT CHANGE UP (ref 0–6)
EPI CELLS # UR: SIGNIFICANT CHANGE UP
GLUCOSE SERPL-MCNC: 117 MG/DL — HIGH (ref 70–99)
GLUCOSE UR QL: NEGATIVE MG/DL — SIGNIFICANT CHANGE UP
HCT VFR BLD CALC: 42.3 % — SIGNIFICANT CHANGE UP (ref 34.5–45)
HGB BLD-MCNC: 13.8 G/DL — SIGNIFICANT CHANGE UP (ref 11.5–15.5)
IMM GRANULOCYTES NFR BLD AUTO: 0.3 % — SIGNIFICANT CHANGE UP (ref 0–1.5)
KETONES UR-MCNC: ABNORMAL
LEUKOCYTE ESTERASE UR-ACNC: ABNORMAL
LYMPHOCYTES # BLD AUTO: 1.73 K/UL — SIGNIFICANT CHANGE UP (ref 1–3.3)
LYMPHOCYTES # BLD AUTO: 15.7 % — SIGNIFICANT CHANGE UP (ref 13–44)
MCHC RBC-ENTMCNC: 28.9 PG — SIGNIFICANT CHANGE UP (ref 27–34)
MCHC RBC-ENTMCNC: 32.6 GM/DL — SIGNIFICANT CHANGE UP (ref 32–36)
MCV RBC AUTO: 88.5 FL — SIGNIFICANT CHANGE UP (ref 80–100)
MONOCYTES # BLD AUTO: 0.89 K/UL — SIGNIFICANT CHANGE UP (ref 0–0.9)
MONOCYTES NFR BLD AUTO: 8.1 % — SIGNIFICANT CHANGE UP (ref 2–14)
NEUTROPHILS # BLD AUTO: 8.25 K/UL — HIGH (ref 1.8–7.4)
NEUTROPHILS NFR BLD AUTO: 74.9 % — SIGNIFICANT CHANGE UP (ref 43–77)
NITRITE UR-MCNC: NEGATIVE — SIGNIFICANT CHANGE UP
PH UR: 6.5 — SIGNIFICANT CHANGE UP (ref 5–8)
PLATELET # BLD AUTO: 239 K/UL — SIGNIFICANT CHANGE UP (ref 150–400)
POTASSIUM SERPL-MCNC: 4.2 MMOL/L — SIGNIFICANT CHANGE UP (ref 3.5–5.3)
POTASSIUM SERPL-SCNC: 4.2 MMOL/L — SIGNIFICANT CHANGE UP (ref 3.5–5.3)
PROT SERPL-MCNC: 7.5 G/DL — SIGNIFICANT CHANGE UP (ref 6.6–8.7)
PROT UR-MCNC: 30 MG/DL
RBC # BLD: 4.78 M/UL — SIGNIFICANT CHANGE UP (ref 3.8–5.2)
RBC # FLD: 13.2 % — SIGNIFICANT CHANGE UP (ref 10.3–14.5)
RBC CASTS # UR COMP ASSIST: ABNORMAL /HPF (ref 0–4)
SODIUM SERPL-SCNC: 139 MMOL/L — SIGNIFICANT CHANGE UP (ref 135–145)
SP GR SPEC: 1.01 — SIGNIFICANT CHANGE UP (ref 1.01–1.02)
UROBILINOGEN FLD QL: NEGATIVE MG/DL — SIGNIFICANT CHANGE UP
WBC # BLD: 11.01 K/UL — HIGH (ref 3.8–10.5)
WBC # FLD AUTO: 11.01 K/UL — HIGH (ref 3.8–10.5)
WBC UR QL: ABNORMAL

## 2021-07-06 PROCEDURE — 99284 EMERGENCY DEPT VISIT MOD MDM: CPT

## 2021-07-06 PROCEDURE — 99283 EMERGENCY DEPT VISIT LOW MDM: CPT

## 2021-07-06 PROCEDURE — 81001 URINALYSIS AUTO W/SCOPE: CPT

## 2021-07-06 PROCEDURE — 36415 COLL VENOUS BLD VENIPUNCTURE: CPT

## 2021-07-06 PROCEDURE — 87086 URINE CULTURE/COLONY COUNT: CPT

## 2021-07-06 PROCEDURE — 85025 COMPLETE CBC W/AUTO DIFF WBC: CPT

## 2021-07-06 PROCEDURE — 80053 COMPREHEN METABOLIC PANEL: CPT

## 2021-07-06 RX ORDER — CEPHALEXIN 500 MG
500 CAPSULE ORAL ONCE
Refills: 0 | Status: DISCONTINUED | OUTPATIENT
Start: 2021-07-06 | End: 2021-07-06

## 2021-07-06 RX ORDER — CEPHALEXIN 500 MG
500 CAPSULE ORAL ONCE
Refills: 0 | Status: COMPLETED | OUTPATIENT
Start: 2021-07-06 | End: 2021-07-06

## 2021-07-06 RX ORDER — CEPHALEXIN 500 MG
10 CAPSULE ORAL
Qty: 140 | Refills: 0
Start: 2021-07-06 | End: 2021-07-12

## 2021-07-06 RX ORDER — CEPHALEXIN 500 MG
1 CAPSULE ORAL
Qty: 14 | Refills: 0
Start: 2021-07-06 | End: 2021-07-12

## 2021-07-06 RX ADMIN — Medication 500 MILLIGRAM(S): at 22:04

## 2021-07-06 NOTE — ED STATDOCS - NSFOLLOWUPINSTRUCTIONS_ED_ALL_ED_FT
- Prescription sent to pharmacy.  - Please keep as scheduled follow up appointments.   - Please bring all documentation from your ED visit to any related future follow up appointment.  - Please call to schedule follow up appointment with your primary care physician within 24-48 hours.  - Please seek immediate medical attention for any new/worsening, signs/symptoms, or concerns.    Feel better!

## 2021-07-06 NOTE — ED STATDOCS - CLINICAL SUMMARY MEDICAL DECISION MAKING FREE TEXT BOX
Pt with pain with swallowing. No vomiting, no foreign body sensation, tolerating PO. Granddaughter concerned for possible UTI and possible dehydration. Will check labs and UA. Will give GI follow up for outpatient studies.

## 2021-07-06 NOTE — ED STATDOCS - ENMT, MLM
posterior pharynx clear. Nasal mucosa clear.  Mouth with normal mucosa  Throat has no vesicles, no oropharyngeal exudates and uvula is midline.

## 2021-07-06 NOTE — ED STATDOCS - OBJECTIVE STATEMENT
88y female with a PMHx of stroke +20 years, which affected swallowing, UTI causing sepsis 2019 presents to the ED c/o worsening, constant difficulty swallowing onset x4 days. Pt reports secondary symptoms of having discomfort of swallowing all the day down, through entire esophogeal region. Pt reports on Saturday, she ate half a slice of watermelon, and since then, she has had pain swallowing anything else since then. Pt's family reports since she has not been drinking/eating as much as this, there is concern for another UTI. Pt went to urgent care, was discharged with no concern, called PMD and was then referred to the ED for further evaluation, since pt has never felt the pain with swallowing before.     Pt denies vomiting, dysuria, abdominal pain, burning urination, chest pain, shortness of breath.

## 2021-07-06 NOTE — ED STATDOCS - PATIENT PORTAL LINK FT
You can access the FollowMyHealth Patient Portal offered by [facility] by registering at the following website: [email or web address]. By joining [employer]’s FollowMyHealth portal, you will also be able to view your health information using other applications (apps) compatible with our system.

## 2021-07-06 NOTE — ED ADULT TRIAGE NOTE - AS HEIGHT TYPE
----- Message from Brendan Deng MD sent at 4/17/2019  3:53 PM CDT -----  Results will be addressed with patient on 4/24/19 as patient is not yet known to provider.    Thank you,    Brendan Deng MD  4/17/2019  3:53 PM   stated

## 2021-07-06 NOTE — ED STATDOCS - PROGRESS NOTE DETAILS
MARISOL Herrera: Patient evaluated by intake physician. HPI/ROS/PE as noted above. Will follow up plan per intake physician and continue to assess patient. Tolerating secretions in ED. Patient has appointment with ENT specialist at the end of the month. Plan of care discussed with daughter at bedside.

## 2021-07-06 NOTE — ED ADULT TRIAGE NOTE - CHIEF COMPLAINT QUOTE
patient c/o epigastric pain everytime she swallows, denies any current pain. symptoms has been going on since Saturday.

## 2021-07-06 NOTE — ED STATDOCS - ATTENDING CONTRIBUTION TO CARE
I, Dr. Hsu, performed the initial face to face bedside interview with this patient regarding history of present illness, review of symptoms and relevant past medical, social and family history.  I completed an independent physical examination.  I was the initial provider who evaluated this patient. I have signed out the follow up of any pending tests (i.e. labs, radiological studies) to the ACP.  I have communicated the patient’s plan of care and disposition with the ACP.

## 2021-07-08 LAB
CULTURE RESULTS: SIGNIFICANT CHANGE UP
SPECIMEN SOURCE: SIGNIFICANT CHANGE UP

## 2021-07-18 ENCOUNTER — TRANSCRIPTION ENCOUNTER (OUTPATIENT)
Age: 86
End: 2021-07-18

## 2021-09-15 ENCOUNTER — APPOINTMENT (OUTPATIENT)
Dept: FAMILY MEDICINE | Facility: CLINIC | Age: 86
End: 2021-09-15
Payer: MEDICARE

## 2021-09-15 VITALS
SYSTOLIC BLOOD PRESSURE: 114 MMHG | DIASTOLIC BLOOD PRESSURE: 70 MMHG | RESPIRATION RATE: 16 BRPM | HEIGHT: 60 IN | WEIGHT: 112 LBS | OXYGEN SATURATION: 98 % | BODY MASS INDEX: 21.99 KG/M2 | TEMPERATURE: 98.2 F | HEART RATE: 79 BPM

## 2021-09-15 PROCEDURE — 36415 COLL VENOUS BLD VENIPUNCTURE: CPT

## 2021-09-15 PROCEDURE — 99214 OFFICE O/P EST MOD 30 MIN: CPT | Mod: 25

## 2021-09-15 NOTE — HISTORY OF PRESENT ILLNESS
[Other: _____] : [unfilled] [FreeTextEntry1] : f/up\par poor appetite [de-identified] : Pt is an 87 yo F with medical hx of HTN on enalapril and metoprolol, hypothyroidism on levothyroxine, and GERD on omeprazole. \par Presents today for f/up. According to granddaughter, pt has poor appetite, loosing weight, she was seen recently by ENT and had a barium swallow test and was started on famotidine.\par Pt denies change in BM. She has a long hx Chronic constipation and she use Miralax prn.\par Feels good overall. Had 2 doses COVID vaccine.

## 2021-09-15 NOTE — ASSESSMENT
[FreeTextEntry1] : Pt is an 87 yo F with medical hx of HTN on enalapril and metoprolol, hypothyroidism on levothyroxine, and GERD on omeprazole. Presents today with weight loss, poor appetite.\par Unintentional weight loss/ Por appetite:\par -recent barium study done with ENT.\par -GI eval advise.\par -Blood drawn today.\par \par HTN:\par -Continue with enalapril and metoprolol\par \par Hypothyroidism:\par -Continue levothyroxine\par \par F/u in 4 months\par

## 2021-09-15 NOTE — REVIEW OF SYSTEMS
[Negative] : Heme/Lymph [Recent Change In Weight] : ~T recent weight change [FreeTextEntry1] : poor appetite

## 2021-09-17 LAB
ALBUMIN SERPL ELPH-MCNC: 4.5 G/DL
ALP BLD-CCNC: 93 U/L
ALT SERPL-CCNC: 10 U/L
ANION GAP SERPL CALC-SCNC: 12 MMOL/L
AST SERPL-CCNC: 11 U/L
BASOPHILS # BLD AUTO: 0.09 K/UL
BASOPHILS NFR BLD AUTO: 1.1 %
BILIRUB SERPL-MCNC: 0.4 MG/DL
BUN SERPL-MCNC: 14 MG/DL
CALCIUM SERPL-MCNC: 9.8 MG/DL
CHLORIDE SERPL-SCNC: 104 MMOL/L
CO2 SERPL-SCNC: 25 MMOL/L
CREAT SERPL-MCNC: 0.81 MG/DL
EOSINOPHIL # BLD AUTO: 0.23 K/UL
EOSINOPHIL NFR BLD AUTO: 2.9 %
FERRITIN SERPL-MCNC: 38 NG/ML
GLUCOSE SERPL-MCNC: 92 MG/DL
HCT VFR BLD CALC: 39.2 %
HGB BLD-MCNC: 11.9 G/DL
IMM GRANULOCYTES NFR BLD AUTO: 0.1 %
LYMPHOCYTES # BLD AUTO: 1.5 K/UL
LYMPHOCYTES NFR BLD AUTO: 19.1 %
MAN DIFF?: NORMAL
MCHC RBC-ENTMCNC: 28.4 PG
MCHC RBC-ENTMCNC: 30.4 GM/DL
MCV RBC AUTO: 93.6 FL
MONOCYTES # BLD AUTO: 0.57 K/UL
MONOCYTES NFR BLD AUTO: 7.3 %
NEUTROPHILS # BLD AUTO: 5.45 K/UL
NEUTROPHILS NFR BLD AUTO: 69.5 %
PLATELET # BLD AUTO: 229 K/UL
POTASSIUM SERPL-SCNC: 4.6 MMOL/L
PROT SERPL-MCNC: 7.1 G/DL
RBC # BLD: 4.19 M/UL
RBC # FLD: 13.8 %
SODIUM SERPL-SCNC: 141 MMOL/L
TSH SERPL-ACNC: 1.26 UIU/ML
WBC # FLD AUTO: 7.85 K/UL

## 2021-09-27 ENCOUNTER — RX RENEWAL (OUTPATIENT)
Age: 86
End: 2021-09-27

## 2021-10-08 ENCOUNTER — TRANSCRIPTION ENCOUNTER (OUTPATIENT)
Age: 86
End: 2021-10-08

## 2021-10-18 ENCOUNTER — TRANSCRIPTION ENCOUNTER (OUTPATIENT)
Age: 86
End: 2021-10-18

## 2021-10-20 ENCOUNTER — TRANSCRIPTION ENCOUNTER (OUTPATIENT)
Age: 86
End: 2021-10-20

## 2021-10-25 NOTE — ED STATDOCS - NSDCPRINTRESULTS_ED_ALL_ED
Sent to non-clinical. negative - no nasal congestion Patient requests all Lab, Cardiology, and Radiology Results on their Discharge Instructions

## 2021-11-16 ENCOUNTER — TRANSCRIPTION ENCOUNTER (OUTPATIENT)
Age: 86
End: 2021-11-16

## 2021-11-18 ENCOUNTER — EMERGENCY (EMERGENCY)
Facility: HOSPITAL | Age: 86
LOS: 1 days | Discharge: HOME | End: 2021-11-18
Attending: STUDENT IN AN ORGANIZED HEALTH CARE EDUCATION/TRAINING PROGRAM
Payer: SELF-PAY

## 2021-11-18 VITALS
OXYGEN SATURATION: 94 % | HEART RATE: 104 BPM | RESPIRATION RATE: 16 BRPM | SYSTOLIC BLOOD PRESSURE: 161 MMHG | DIASTOLIC BLOOD PRESSURE: 117 MMHG

## 2021-11-18 VITALS
OXYGEN SATURATION: 96 % | SYSTOLIC BLOOD PRESSURE: 174 MMHG | DIASTOLIC BLOOD PRESSURE: 103 MMHG | WEIGHT: 115.08 LBS | HEART RATE: 103 BPM | RESPIRATION RATE: 18 BRPM | HEIGHT: 60 IN | TEMPERATURE: 98 F

## 2021-11-18 DIAGNOSIS — Z98.890 OTHER SPECIFIED POSTPROCEDURAL STATES: Chronic | ICD-10-CM

## 2021-11-18 DIAGNOSIS — Z98.891 HISTORY OF UTERINE SCAR FROM PREVIOUS SURGERY: Chronic | ICD-10-CM

## 2021-11-18 LAB
ALBUMIN SERPL ELPH-MCNC: 3.6 G/DL — SIGNIFICANT CHANGE UP (ref 3.3–5.2)
ALP SERPL-CCNC: 100 U/L — SIGNIFICANT CHANGE UP (ref 40–120)
ALT FLD-CCNC: 13 U/L — SIGNIFICANT CHANGE UP
ANION GAP SERPL CALC-SCNC: 14 MMOL/L — SIGNIFICANT CHANGE UP (ref 5–17)
APPEARANCE UR: CLEAR — SIGNIFICANT CHANGE UP
AST SERPL-CCNC: 10 U/L — SIGNIFICANT CHANGE UP
BACTERIA # UR AUTO: ABNORMAL
BASOPHILS # BLD AUTO: 0.11 K/UL — SIGNIFICANT CHANGE UP (ref 0–0.2)
BASOPHILS NFR BLD AUTO: 1.1 % — SIGNIFICANT CHANGE UP (ref 0–2)
BILIRUB SERPL-MCNC: 0.7 MG/DL — SIGNIFICANT CHANGE UP (ref 0.4–2)
BILIRUB UR-MCNC: NEGATIVE — SIGNIFICANT CHANGE UP
BUN SERPL-MCNC: 19.2 MG/DL — SIGNIFICANT CHANGE UP (ref 8–20)
CALCIUM SERPL-MCNC: 8.9 MG/DL — SIGNIFICANT CHANGE UP (ref 8.6–10.2)
CHLORIDE SERPL-SCNC: 103 MMOL/L — SIGNIFICANT CHANGE UP (ref 98–107)
CO2 SERPL-SCNC: 21 MMOL/L — LOW (ref 22–29)
COLOR SPEC: YELLOW — SIGNIFICANT CHANGE UP
CREAT SERPL-MCNC: 0.62 MG/DL — SIGNIFICANT CHANGE UP (ref 0.5–1.3)
DIFF PNL FLD: ABNORMAL
EOSINOPHIL # BLD AUTO: 0.35 K/UL — SIGNIFICANT CHANGE UP (ref 0–0.5)
EOSINOPHIL NFR BLD AUTO: 3.4 % — SIGNIFICANT CHANGE UP (ref 0–6)
EPI CELLS # UR: SIGNIFICANT CHANGE UP
GLUCOSE SERPL-MCNC: 104 MG/DL — HIGH (ref 70–99)
GLUCOSE UR QL: NEGATIVE MG/DL — SIGNIFICANT CHANGE UP
HCT VFR BLD CALC: 36.9 % — SIGNIFICANT CHANGE UP (ref 34.5–45)
HGB BLD-MCNC: 11.8 G/DL — SIGNIFICANT CHANGE UP (ref 11.5–15.5)
IMM GRANULOCYTES NFR BLD AUTO: 0.4 % — SIGNIFICANT CHANGE UP (ref 0–1.5)
KETONES UR-MCNC: NEGATIVE — SIGNIFICANT CHANGE UP
LEUKOCYTE ESTERASE UR-ACNC: ABNORMAL
LYMPHOCYTES # BLD AUTO: 1.37 K/UL — SIGNIFICANT CHANGE UP (ref 1–3.3)
LYMPHOCYTES # BLD AUTO: 13.4 % — SIGNIFICANT CHANGE UP (ref 13–44)
MCHC RBC-ENTMCNC: 27.3 PG — SIGNIFICANT CHANGE UP (ref 27–34)
MCHC RBC-ENTMCNC: 32 GM/DL — SIGNIFICANT CHANGE UP (ref 32–36)
MCV RBC AUTO: 85.4 FL — SIGNIFICANT CHANGE UP (ref 80–100)
MONOCYTES # BLD AUTO: 0.79 K/UL — SIGNIFICANT CHANGE UP (ref 0–0.9)
MONOCYTES NFR BLD AUTO: 7.7 % — SIGNIFICANT CHANGE UP (ref 2–14)
NEUTROPHILS # BLD AUTO: 7.56 K/UL — HIGH (ref 1.8–7.4)
NEUTROPHILS NFR BLD AUTO: 74 % — SIGNIFICANT CHANGE UP (ref 43–77)
NITRITE UR-MCNC: NEGATIVE — SIGNIFICANT CHANGE UP
PH UR: 6.5 — SIGNIFICANT CHANGE UP (ref 5–8)
PLATELET # BLD AUTO: 340 K/UL — SIGNIFICANT CHANGE UP (ref 150–400)
POTASSIUM SERPL-MCNC: 3.7 MMOL/L — SIGNIFICANT CHANGE UP (ref 3.5–5.3)
POTASSIUM SERPL-SCNC: 3.7 MMOL/L — SIGNIFICANT CHANGE UP (ref 3.5–5.3)
PROT SERPL-MCNC: 6.6 G/DL — SIGNIFICANT CHANGE UP (ref 6.6–8.7)
PROT UR-MCNC: 30 MG/DL
RBC # BLD: 4.32 M/UL — SIGNIFICANT CHANGE UP (ref 3.8–5.2)
RBC # FLD: 14.6 % — HIGH (ref 10.3–14.5)
RBC CASTS # UR COMP ASSIST: ABNORMAL /HPF (ref 0–4)
SODIUM SERPL-SCNC: 138 MMOL/L — SIGNIFICANT CHANGE UP (ref 135–145)
SP GR SPEC: 1.01 — SIGNIFICANT CHANGE UP (ref 1.01–1.02)
UROBILINOGEN FLD QL: NEGATIVE MG/DL — SIGNIFICANT CHANGE UP
WBC # BLD: 10.22 K/UL — SIGNIFICANT CHANGE UP (ref 3.8–10.5)
WBC # FLD AUTO: 10.22 K/UL — SIGNIFICANT CHANGE UP (ref 3.8–10.5)
WBC UR QL: SIGNIFICANT CHANGE UP

## 2021-11-18 PROCEDURE — 71046 X-RAY EXAM CHEST 2 VIEWS: CPT | Mod: 26

## 2021-11-18 PROCEDURE — 99284 EMERGENCY DEPT VISIT MOD MDM: CPT

## 2021-11-18 PROCEDURE — 36415 COLL VENOUS BLD VENIPUNCTURE: CPT

## 2021-11-18 PROCEDURE — 85025 COMPLETE CBC W/AUTO DIFF WBC: CPT

## 2021-11-18 PROCEDURE — 71046 X-RAY EXAM CHEST 2 VIEWS: CPT

## 2021-11-18 PROCEDURE — 87086 URINE CULTURE/COLONY COUNT: CPT

## 2021-11-18 PROCEDURE — 99284 EMERGENCY DEPT VISIT MOD MDM: CPT | Mod: 25

## 2021-11-18 PROCEDURE — 81001 URINALYSIS AUTO W/SCOPE: CPT

## 2021-11-18 PROCEDURE — 80053 COMPREHEN METABOLIC PANEL: CPT

## 2021-11-18 RX ORDER — SODIUM CHLORIDE 9 MG/ML
1000 INJECTION, SOLUTION INTRAVENOUS ONCE
Refills: 0 | Status: COMPLETED | OUTPATIENT
Start: 2021-11-18 | End: 2021-11-18

## 2021-11-18 RX ADMIN — Medication 100 MILLIGRAM(S): at 16:38

## 2021-11-18 RX ADMIN — SODIUM CHLORIDE 1000 MILLILITER(S): 9 INJECTION, SOLUTION INTRAVENOUS at 15:13

## 2021-11-18 NOTE — ED ADULT NURSE NOTE - OBJECTIVE STATEMENT
Patient with right flank pain with recent UTI. pt reports pain comes and goes, pain is less at the moment. Patient is alert and oriented x4, daughter at bedside. Educated patient on plan of care.  Stretcher in lowest position, wheels locked, call bell within reach.

## 2021-11-18 NOTE — ED PROVIDER NOTE - PROGRESS NOTE DETAILS
UA shows hematuria. pt's daughter states she had hematuria in the past and was evaluated by urology and had cystoscopy. discussed need for f/u. pt with no acute distress on exam. low suspicion for kidney stone. no gross abnormalities on blood work. CXR with infiltrate on RLL. Will treat for PNA. Discussed finishing keflex for possible UTI and will prescribe doxy for PNA. will discharge patient home at this time. printed out copies of results for patient to take home. discussed return precautions and need for outpatient follow up.

## 2021-11-18 NOTE — ED PROVIDER NOTE - CLINICAL SUMMARY MEDICAL DECISION MAKING FREE TEXT BOX
89yo woman presents with right flank pain in setting of UTI and cough x 2-3 weeks. Pt is well appearing on exam and denies pain or need for pain medication. Evaluated with blood work and UA as well as CXR. 89yo woman presents with right flank pain in setting of UTI and cough x 2-3 weeks. Pt is well appearing on exam and denies pain or need for pain medication. Concern for pyelo vs pna. Low suspicion for kidney stone/dissection as pt is well appearing with no pain. Evaluated with blood work,UA, and CXR.

## 2021-11-18 NOTE — ED PROVIDER NOTE - NSFOLLOWUPINSTRUCTIONS_ED_ALL_ED_FT
You were seen and evaluated in the emergency room for symptoms that may be due to pneumonia.    Please follow up with your primary care provider in the next few days.    You were also found to have some blood in your urine. Follow up with your urologist.     Take 20mL of doxycycline twice a day for the next 7 days. Make sure to take with food and use sun protection.    Continue all other home medications as prescribed.    Seek immediate medical attention for any worsening, new, or concerning symptoms.    Please read all attached. You were seen and evaluated in the emergency room for symptoms that may be due to pneumonia.    Please follow up with your primary care provider in the next few days.  You were found to have high blood pressure. Please have your blood pressure evaluated by your primary care provider.    You were also found to have some blood in your urine. Follow up with your urologist.     Take 20mL of doxycycline twice a day for the next 7 days. Make sure to take with food and use sun protection.    Drink plenty of fluids to stay hydrated.    Continue all other home medications as prescribed.    Seek immediate medical attention for any worsening, new, or concerning symptoms.    Please read all attached.

## 2021-11-18 NOTE — ED PROVIDER NOTE - PHYSICAL EXAMINATION
General: well-appearing elderly woman in no acute distress  Head: normocephalic, atraumatic  Eyes: clear eyes  Mouth: moist mucous membranes  Neck: supple neck  CV: normal rate and rhythm, no LE edema, peripheral pulses 2+ bilateral UE and LE  Respiratory: clear to auscultation bilaterally, no wheezing  Abdomen: soft, nontender, nondistended  : no CVAT  MSK: no joint deformities, no midline spinal tenderness  Neuro: alert and oriented x3, speech clear, moving all extremities spontaneously without difficulty  Skin: no rash noted on flank General: well-appearing elderly woman in no acute distress  Head: normocephalic, atraumatic  Eyes: clear eyes  Mouth: moist mucous membranes  Neck: supple neck  CV: normal rate and rhythm, no LE edema, peripheral pulses 2+ bilateral UE and LE  Respiratory: clear to auscultation bilaterally, no wheezing  Abdomen: soft, nontender, nondistended  : no CVAT  MSK: no joint deformities, no tenderness to palpation of flank, no midline spinal tenderness  Neuro: alert and oriented x3, speech clear, moving all extremities spontaneously without difficulty  Skin: no rash noted on flank

## 2021-11-18 NOTE — ED ADULT TRIAGE NOTE - RESPIRATORY RATE (BREATHS/MIN)
18 Female Pregnancy Counseling Text: Female patients should also be on two forms of birth control while taking this medication and for one month after their last dose.

## 2021-11-18 NOTE — ED PROVIDER NOTE - OBJECTIVE STATEMENT
88yo woman PMH hypothyroidism, GERD, arthritis presents with right flank pain intermittently x few days. Was recently diagnosed with UTI and placed on keflex 2 days ago. She has also been having a cough x 2-3 weeks. No fevers. No n/v/d, abdominal pain, dysuria. No blood in stool or urine. No rash or trauma.

## 2021-11-18 NOTE — ED PROVIDER NOTE - NS ED ROS FT
General: no fever  Head: no headache  Eyes: no vision change  ENT: no nasal discharge/congestion, no sore throat  CV: no chest pain  Resp: no SOB, +cough  GI: no N/V/D, no abdominal pain  : no dysuria, no hematuria  MSK: +intermittent right flank pain  Skin: no new rash  Neuro: no focal weakness, no change in sensation

## 2021-11-18 NOTE — ED PROVIDER NOTE - PATIENT PORTAL LINK FT
You can access the FollowMyHealth Patient Portal offered by [facility] by registering at the following website: [email or web address]. By joining [employer]’s FollowMyHealth portal, you will also be able to view your health information using other applications (apps) compatible with our system.

## 2021-11-20 LAB
CULTURE RESULTS: NO GROWTH — SIGNIFICANT CHANGE UP
SPECIMEN SOURCE: SIGNIFICANT CHANGE UP

## 2021-11-20 NOTE — ED POST DISCHARGE NOTE - DETAILS
Called patient via telephone with number listed in chart, there was no answer, no voicemail box set up, will send certified letter.

## 2021-12-08 ENCOUNTER — APPOINTMENT (OUTPATIENT)
Dept: FAMILY MEDICINE | Facility: CLINIC | Age: 86
End: 2021-12-08
Payer: MEDICARE

## 2021-12-08 ENCOUNTER — NON-APPOINTMENT (OUTPATIENT)
Age: 86
End: 2021-12-08

## 2021-12-08 ENCOUNTER — LABORATORY RESULT (OUTPATIENT)
Age: 86
End: 2021-12-08

## 2021-12-08 ENCOUNTER — INPATIENT (INPATIENT)
Facility: HOSPITAL | Age: 86
LOS: 6 days | Discharge: NURSING, REHAB OR LONG-TERM CARE | DRG: 291 | End: 2021-12-15
Attending: INTERNAL MEDICINE | Admitting: INTERNAL MEDICINE
Payer: MEDICARE

## 2021-12-08 ENCOUNTER — RESULT CHARGE (OUTPATIENT)
Age: 86
End: 2021-12-08

## 2021-12-08 VITALS
WEIGHT: 120 LBS | DIASTOLIC BLOOD PRESSURE: 68 MMHG | OXYGEN SATURATION: 98 % | HEART RATE: 87 BPM | BODY MASS INDEX: 23.56 KG/M2 | HEIGHT: 60 IN | TEMPERATURE: 98.2 F | SYSTOLIC BLOOD PRESSURE: 110 MMHG | RESPIRATION RATE: 16 BRPM

## 2021-12-08 VITALS
OXYGEN SATURATION: 98 % | RESPIRATION RATE: 18 BRPM | HEART RATE: 105 BPM | TEMPERATURE: 98 F | WEIGHT: 119.93 LBS | DIASTOLIC BLOOD PRESSURE: 83 MMHG | SYSTOLIC BLOOD PRESSURE: 143 MMHG | HEIGHT: 60 IN

## 2021-12-08 DIAGNOSIS — Z98.890 OTHER SPECIFIED POSTPROCEDURAL STATES: Chronic | ICD-10-CM

## 2021-12-08 DIAGNOSIS — R60.0 LOCALIZED EDEMA: ICD-10-CM

## 2021-12-08 DIAGNOSIS — R06.00 DYSPNEA, UNSPECIFIED: ICD-10-CM

## 2021-12-08 DIAGNOSIS — Z98.891 HISTORY OF UTERINE SCAR FROM PREVIOUS SURGERY: Chronic | ICD-10-CM

## 2021-12-08 DIAGNOSIS — I50.9 HEART FAILURE, UNSPECIFIED: ICD-10-CM

## 2021-12-08 PROBLEM — E03.9 HYPOTHYROIDISM, UNSPECIFIED: Chronic | Status: ACTIVE | Noted: 2021-11-18

## 2021-12-08 PROBLEM — K21.9 GASTRO-ESOPHAGEAL REFLUX DISEASE WITHOUT ESOPHAGITIS: Chronic | Status: ACTIVE | Noted: 2021-11-18

## 2021-12-08 PROBLEM — M19.90 UNSPECIFIED OSTEOARTHRITIS, UNSPECIFIED SITE: Chronic | Status: ACTIVE | Noted: 2021-11-18

## 2021-12-08 LAB
ALBUMIN SERPL ELPH-MCNC: 3.3 G/DL — SIGNIFICANT CHANGE UP (ref 3.3–5.2)
ALP SERPL-CCNC: 107 U/L — SIGNIFICANT CHANGE UP (ref 40–120)
ALT FLD-CCNC: 18 U/L — SIGNIFICANT CHANGE UP
ANION GAP SERPL CALC-SCNC: 14 MMOL/L — SIGNIFICANT CHANGE UP (ref 5–17)
AST SERPL-CCNC: 12 U/L — SIGNIFICANT CHANGE UP
BASOPHILS # BLD AUTO: 0.08 K/UL — SIGNIFICANT CHANGE UP (ref 0–0.2)
BASOPHILS NFR BLD AUTO: 0.7 % — SIGNIFICANT CHANGE UP (ref 0–2)
BILIRUB SERPL-MCNC: 0.5 MG/DL — SIGNIFICANT CHANGE UP (ref 0.4–2)
BUN SERPL-MCNC: 21.6 MG/DL — HIGH (ref 8–20)
CALCIUM SERPL-MCNC: 8.6 MG/DL — SIGNIFICANT CHANGE UP (ref 8.6–10.2)
CHLORIDE SERPL-SCNC: 106 MMOL/L — SIGNIFICANT CHANGE UP (ref 98–107)
CO2 SERPL-SCNC: 23 MMOL/L — SIGNIFICANT CHANGE UP (ref 22–29)
CREAT SERPL-MCNC: 0.64 MG/DL — SIGNIFICANT CHANGE UP (ref 0.5–1.3)
EOSINOPHIL # BLD AUTO: 0.17 K/UL — SIGNIFICANT CHANGE UP (ref 0–0.5)
EOSINOPHIL NFR BLD AUTO: 1.5 % — SIGNIFICANT CHANGE UP (ref 0–6)
GLUCOSE SERPL-MCNC: 105 MG/DL — HIGH (ref 70–99)
HCT VFR BLD CALC: 33.4 % — LOW (ref 34.5–45)
HGB BLD-MCNC: 10.4 G/DL — LOW (ref 11.5–15.5)
IMM GRANULOCYTES NFR BLD AUTO: 0.6 % — SIGNIFICANT CHANGE UP (ref 0–1.5)
LYMPHOCYTES # BLD AUTO: 1.05 K/UL — SIGNIFICANT CHANGE UP (ref 1–3.3)
LYMPHOCYTES # BLD AUTO: 9.1 % — LOW (ref 13–44)
MCHC RBC-ENTMCNC: 26.5 PG — LOW (ref 27–34)
MCHC RBC-ENTMCNC: 31.1 GM/DL — LOW (ref 32–36)
MCV RBC AUTO: 85 FL — SIGNIFICANT CHANGE UP (ref 80–100)
MONOCYTES # BLD AUTO: 0.94 K/UL — HIGH (ref 0–0.9)
MONOCYTES NFR BLD AUTO: 8.1 % — SIGNIFICANT CHANGE UP (ref 2–14)
NEUTROPHILS # BLD AUTO: 9.25 K/UL — HIGH (ref 1.8–7.4)
NEUTROPHILS NFR BLD AUTO: 80 % — HIGH (ref 43–77)
NT-PROBNP SERPL-SCNC: 7184 PG/ML — HIGH (ref 0–300)
PLATELET # BLD AUTO: 346 K/UL — SIGNIFICANT CHANGE UP (ref 150–400)
POTASSIUM SERPL-MCNC: 3.2 MMOL/L — LOW (ref 3.5–5.3)
POTASSIUM SERPL-SCNC: 3.2 MMOL/L — LOW (ref 3.5–5.3)
PROT SERPL-MCNC: 6 G/DL — LOW (ref 6.6–8.7)
RBC # BLD: 3.93 M/UL — SIGNIFICANT CHANGE UP (ref 3.8–5.2)
RBC # FLD: 14.8 % — HIGH (ref 10.3–14.5)
SARS-COV-2 RNA SPEC QL NAA+PROBE: SIGNIFICANT CHANGE UP
SODIUM SERPL-SCNC: 143 MMOL/L — SIGNIFICANT CHANGE UP (ref 135–145)
TROPONIN T SERPL-MCNC: 0.01 NG/ML — SIGNIFICANT CHANGE UP (ref 0–0.06)
WBC # BLD: 11.56 K/UL — HIGH (ref 3.8–10.5)
WBC # FLD AUTO: 11.56 K/UL — HIGH (ref 3.8–10.5)

## 2021-12-08 PROCEDURE — 36415 COLL VENOUS BLD VENIPUNCTURE: CPT

## 2021-12-08 PROCEDURE — 93010 ELECTROCARDIOGRAM REPORT: CPT

## 2021-12-08 PROCEDURE — 93000 ELECTROCARDIOGRAM COMPLETE: CPT

## 2021-12-08 PROCEDURE — 93970 EXTREMITY STUDY: CPT | Mod: 26

## 2021-12-08 PROCEDURE — 99223 1ST HOSP IP/OBS HIGH 75: CPT

## 2021-12-08 PROCEDURE — 71045 X-RAY EXAM CHEST 1 VIEW: CPT | Mod: 26

## 2021-12-08 PROCEDURE — 99285 EMERGENCY DEPT VISIT HI MDM: CPT

## 2021-12-08 PROCEDURE — 99214 OFFICE O/P EST MOD 30 MIN: CPT | Mod: 25

## 2021-12-08 RX ORDER — POTASSIUM CHLORIDE 20 MEQ
10 PACKET (EA) ORAL ONCE
Refills: 0 | Status: DISCONTINUED | OUTPATIENT
Start: 2021-12-08 | End: 2021-12-08

## 2021-12-08 RX ORDER — PHENAZOPYRIDINE 200 MG/1
200 TABLET, FILM COATED ORAL 3 TIMES DAILY
Qty: 6 | Refills: 0 | Status: DISCONTINUED | COMMUNITY
Start: 2020-11-09 | End: 2021-12-08

## 2021-12-08 RX ORDER — POTASSIUM CHLORIDE 20 MEQ
40 PACKET (EA) ORAL ONCE
Refills: 0 | Status: DISCONTINUED | OUTPATIENT
Start: 2021-12-08 | End: 2021-12-08

## 2021-12-08 RX ORDER — POTASSIUM CHLORIDE 20 MEQ
10 PACKET (EA) ORAL
Refills: 0 | Status: COMPLETED | OUTPATIENT
Start: 2021-12-08 | End: 2021-12-09

## 2021-12-08 RX ORDER — CIPROFLOXACIN HYDROCHLORIDE 500 MG/1
500 TABLET, FILM COATED ORAL TWICE DAILY
Qty: 6 | Refills: 0 | Status: DISCONTINUED | COMMUNITY
Start: 2021-10-08 | End: 2021-12-08

## 2021-12-08 RX ORDER — METOPROLOL TARTRATE 50 MG
25 TABLET ORAL DAILY
Refills: 0 | Status: DISCONTINUED | OUTPATIENT
Start: 2021-12-08 | End: 2021-12-12

## 2021-12-08 RX ORDER — FAMOTIDINE 10 MG/ML
1 INJECTION INTRAVENOUS
Qty: 0 | Refills: 0 | DISCHARGE

## 2021-12-08 RX ORDER — OMEPRAZOLE 10 MG/1
1 CAPSULE, DELAYED RELEASE ORAL
Qty: 0 | Refills: 0 | DISCHARGE

## 2021-12-08 RX ORDER — FUROSEMIDE 20 MG/1
20 TABLET ORAL
Qty: 30 | Refills: 0 | Status: ACTIVE | COMMUNITY
Start: 2021-12-08 | End: 1900-01-01

## 2021-12-08 RX ORDER — PANTOPRAZOLE SODIUM 20 MG/1
40 TABLET, DELAYED RELEASE ORAL
Refills: 0 | Status: DISCONTINUED | OUTPATIENT
Start: 2021-12-08 | End: 2021-12-12

## 2021-12-08 RX ORDER — FUROSEMIDE 40 MG
40 TABLET ORAL ONCE
Refills: 0 | Status: COMPLETED | OUTPATIENT
Start: 2021-12-08 | End: 2021-12-08

## 2021-12-08 RX ORDER — LEVOTHYROXINE SODIUM 125 MCG
50 TABLET ORAL DAILY
Refills: 0 | Status: DISCONTINUED | OUTPATIENT
Start: 2021-12-08 | End: 2021-12-12

## 2021-12-08 RX ORDER — LEVOTHYROXINE SODIUM 125 MCG
1 TABLET ORAL
Qty: 0 | Refills: 0 | DISCHARGE

## 2021-12-08 RX ORDER — FAMOTIDINE 10 MG/ML
20 INJECTION INTRAVENOUS AT BEDTIME
Refills: 0 | Status: DISCONTINUED | OUTPATIENT
Start: 2021-12-08 | End: 2021-12-12

## 2021-12-08 RX ORDER — CHOLECALCIFEROL (VITAMIN D3) 125 MCG
1000 CAPSULE ORAL DAILY
Refills: 0 | Status: DISCONTINUED | OUTPATIENT
Start: 2021-12-08 | End: 2021-12-12

## 2021-12-08 RX ADMIN — Medication 100 MILLIEQUIVALENT(S): at 23:27

## 2021-12-08 RX ADMIN — Medication 40 MILLIGRAM(S): at 16:15

## 2021-12-08 NOTE — CONSULT NOTE ADULT - SUBJECTIVE AND OBJECTIVE BOX
[place] [facility], [facility]                                   [address] [place] [address]                                                      PHONE: [phone]                                                         FAX: [phone]  [email or web address]  ---------------------------------------------------------------------------------------------------------------------------------    Reason for Consult: CHF    HPI:  TERESA ULLOA is an 89y Female with history of hypertension, CVA, atrial fibrillation not on anticoagulation due to history of ICH, peripheral artery disease who presents with LE swelling for the last three weeks. Recently diagnosed with UTI and pneumonia, has completed course of antibiotics. Over the last three weeks, continues to have a cough in association with worsening edema in lower extremities, started out with one side, now bilateral up to thighs. She coughs whenever she talks or moves around, otherwise denies chest pain, palpitations, dizziness or lightheadedness. She continues to have urgency but no pain with urination.     PAST MEDICAL & SURGICAL HISTORY:  GERD (gastroesophageal reflux disease)    Hypothyroidism    Arthritis    H/O:     H/O hernia repair        Macrobid (Other)      MEDICATIONS  (STANDING):    MEDICATIONS  (PRN):      Social History:      ROS: Negative other than as mentioned in HPI.    Vital Signs Last 24 Hrs  T(C): 36.8 (08 Dec 2021 14:25), Max: 36.8 (08 Dec 2021 14:25)  T(F): 98.3 (08 Dec 2021 14:25), Max: 98.3 (08 Dec 2021 14:25)  HR: 105 (08 Dec 2021 14:25) (105 - 105)  BP: 143/83 (08 Dec 2021 14:25) (143/83 - 143/83)  BP(mean): --  RR: 18 (08 Dec 2021 14:25) (18 - 18)  SpO2: 98% (08 Dec 2021 14:25) (98% - 98%)  ICU Vital Signs Last 24 Hrs  TERESA ULLOA  I&O's Detail    I&O's Summary    Drug Dosing Weight  TERESA ULLOA      PHYSICAL EXAM:  General: Appears well developed, well nourished alert and cooperative.  HEENT: Head; normocephalic, atraumatic.  Eyes: Pupils reactive, cornea wnl.  Neck: Supple, no adenopathy, no NVD or carotid bruit or thyromegaly.  CARDIOVASCULAR: Irr irr   LUNGS: No rales, rhonchi or wheeze. Normal breath sounds bilaterally.  ABDOMEN: Soft, nontender without mass or organomegaly. bowel sounds normoactive.  EXTREMITIES: 2+ pitting edema up to thighs. Distal pulses wnl.   SKIN: warm and dry with normal turgor.  NEURO: Alert/oriented x 3/normal motor exam. No pathologic reflexes.    PSYCH: Appropriate affect.        LABS:                        10.4   11.56 )-----------( 346      ( 08 Dec 2021 15:52 )             33.4     12-08    143  |  106  |  21.6<H>  ----------------------------<  105<H>  3.2<L>   |  23.0  |  0.64    Ca    8.6      08 Dec 2021 15:52    TPro  6.0<L>  /  Alb  3.3  /  TBili  0.5  /  DBili  x   /  AST  12  /  ALT  18  /  AlkPhos  107  12-08    TERESA ULLOA  CARDIAC MARKERS ( 08 Dec 2021 15:52 )  x     / 0.01 ng/mL / x     / x     / x        RADIOLOGY & ADDITIONAL STUDIES:    INTERPRETATION OF TELEMETRY (personally reviewed):    ECG: atrial fibrillation with . Anterior infarct. Low voltage    ECHO:  10/2019:  EF 65-70%. Normal LV, RV function. No significant valvular disease.    STRESS TEST:  2019:  Coronary calcification in LAD, LCx. Normal perfusion. EF > 70%.    Assessment and Plan:  In summary, TERESA ULLOA is an 89y Female with history of hypertension, CVA, atrial fibrillation not on anticoagulation due to history of ICH, peripheral artery disease who presents with LE swelling and cough for the last three weeks, found to have elevated BNP of over 7k consistent with CHF.     - Replete K > 4.0, Mg > 2  - Lasix 40 mg IV bid  - Continue Toprol 25 mg daily  - Hold enalapril for now  - Echocardiogram  - Hold anticoagulation  - Repeat UA  - CXR

## 2021-12-08 NOTE — ED PROVIDER NOTE - PHYSICAL EXAMINATION
Alert, lucid, and in no apparent distress. Pt is normocephalic, atraumatic.  Pupils are equal, round, lips pink, moist mucous membranes, tongue midline. Neck supple.   Lungs clear to auscultation. Heart regular rate and rhythm, normal S1, S2, no murmur.  + 3 edema lower ext  b/l  Abdomen is soft, nontender, no pulsatile mass, no masses, no distension, no rebound. No CVA Tenderness, no suprapubic tenderness.   Non-focal sensory, 5 out of 5 motor strength, no dysmetria, fluent, goal directed speech. CN2 to 12 intact. Skin without rash,   No submandibular adenopathy. Normal mentation, does not appear agitated

## 2021-12-08 NOTE — H&P ADULT - ASSESSMENT
pt. is  an 88 y/o Female with history of hypertension, CVA no residual deficits , atrial fibrillation not on anticoagulation due to history of ICH in year 2000 as per pt's granddaughter ,  who presents with LE swelling for the last three weeks and also, dry coug on and off, sob with activity, no fever.  sob better at rest, sleeps with head up. As per family no prior h/o chf. LAst hospital admission was more y[facility] a year ago but as per family had ER visits in july and november this year for uti/ pneumonia. denies chest pain, palpitations, dizziness or lightheadedness.  no abd. pain, no n/v/d. reports urine urgency but no pain with urination, pt's u/a is pending at the time of admission, afebrile in the ER. no swallowing difficulty reported but appetite is poor.

## 2021-12-08 NOTE — ED ADULT NURSE NOTE - OBJECTIVE STATEMENT
88 y/o female presents to ed with daughter after she saw her pmd this morning and was put on antibiotics for a pleural effusion. dr told daughter that he reviewed the ekg again and suggested patient come to the ED. Patient is awake alert and oriented x 3 although daughter reports episodes of confusion over the last month or so. Patient recently hospitalized for UTI, kidney infection. Patient has bilateral lower extremity +4 pitting edema. #20 placed in left forearm. Labs drawn and sent. Updated regarding plan of care. Denies any other complaints.

## 2021-12-08 NOTE — ED PROVIDER NOTE - CLINICAL SUMMARY MEDICAL DECISION MAKING FREE TEXT BOX
h/o cva, peripheral vascular disease with lower leg edema x 3 weeks and afib ? new   eval chf and anticoagulation

## 2021-12-08 NOTE — H&P ADULT - NSHPPHYSICALEXAM_GEN_ALL_CORE
Vital Signs Last 24 Hrs  T(C): 36.8 (08 Dec 2021 14:25), Max: 36.8 (08 Dec 2021 14:25)  T(F): 98.3 (08 Dec 2021 14:25), Max: 98.3 (08 Dec 2021 14:25)  HR: 95 (08 Dec 2021 22:14) (95 - 105)  BP: 135/79 (08 Dec 2021 22:14) (135/79 - 143/83)  BP(mean): --  RR: 16 (08 Dec 2021 22:14) (16 - 18)  SpO2: 98% (08 Dec 2021 22:14) (98% - 98%)    General: pt. sitting up in bed not in distress.  HEENT: AT, NC. PERRL. intact EOM. no throat erythema or exudate.   Neck: supple. mild JVD.   Chest: basal crackles bilaterally. no inter costal retractions.  Heart: S1,S2. RRR. no heart murmur. 2 to 3+ edema of B/L LE.  Abdomen: soft. non-tender. non-distended. + BS.   Ext: no calf tenderness, distal pulses intact.  Neuro: AAO x3. no focal weakness. no speech disorder, cns intact.  Skin: warm and dry. no cyanosis. no pallor.   lymphatic system : no lymphadenopathy noted.  psych : pleasant, mood ok, no si/hi.

## 2021-12-08 NOTE — REVIEW OF SYSTEMS
[Negative] : Heme/Lymph [Recent Change In Weight] : ~T recent weight change [Lower Ext Edema] : lower extremity edema [Cough] : cough [Dyspnea on Exertion] : dyspnea on exertion [FreeTextEntry1] : poor appetite

## 2021-12-08 NOTE — ED ADULT NURSE NOTE - INTERVENTIONS DEFINITIONS
[place] to call system/Instruct patient to call for assistance/Provide visual cue, wrist band, yellow gown, etc.

## 2021-12-08 NOTE — HISTORY OF PRESENT ILLNESS
[Other: _____] : [unfilled] [FreeTextEntry1] : Edema LE's\par Dyspnea\par Cough [de-identified] : Pt is an 88 yo F with medical hx of HTN on enalapril and metoprolol, hypothyroidism on levothyroxine, and GERD on omeprazole. \par She was seen in the ER at [facility] on 11/18/21 with Dx: PNA. She was given Doxycycline PO. According to daughter 2 days prior she was seen at urgent care for flank pain with dx: UTI was given keflex.\par Cxray in the ER 11/18/21: B/L pleural effusions with possible infiltrate.\par Daughter report persistent cough, SOB with exertional and increase edema in LE's.\par She was seen by cardiology at [facility] 2 years ago.\par Daughter is also concern about poor appetite and lack of interest, sleeping all day, no energy.\par Feels good overall. Had 2 doses COVID vaccine.

## 2021-12-08 NOTE — H&P ADULT - PROBLEM SELECTOR PLAN 1
no prior h/o chf, systolic vs diastolic ? will get echo, iv lasix 40 mg bid, wt. daily, i/O, cardio consult appreciated, recommending to hold vasotec at this point. pt's dry cough very likely is chf and fluid over load related.   u/a to follow. hold antibiotics till u/a available.

## 2021-12-08 NOTE — H&P ADULT - NSICDXPASTMEDICALHX_GEN_ALL_CORE_FT
PAST MEDICAL HISTORY:  Arthritis     GERD (gastroesophageal reflux disease)     HTN (hypertension)     Hypothyroidism

## 2021-12-08 NOTE — ED PROVIDER NOTE - OBJECTIVE STATEMENT
89 yo female pmh cva, peripheral vascular disease, comes to ed from PMD noted with increased swelling of lower extremities  x 3 weeks; pt also noted with ekg  noted for atrial fibrillation; pt presently on no anticoagulation;   pt denies any  fever, chills, sick contacts

## 2021-12-08 NOTE — H&P ADULT - NSHPLABSRESULTS_GEN_ALL_CORE
ekg afib 110, cardiologist read pending.   cxr b/l pleural effusions likely small noted, no obvious infiltrate, follow radiologist read.

## 2021-12-08 NOTE — ED ADULT TRIAGE NOTE - CHIEF COMPLAINT QUOTE
as per daughter and patient, patient has been SOB for 2 weeks, leg swelling and cough. Was sent by MD for new onset afib on EKG.

## 2021-12-08 NOTE — ASSESSMENT
[FreeTextEntry1] : Pt is an 89 yo F with medical hx of HTN on enalapril and metoprolol, hypothyroidism on levothyroxine, and GERD on omeprazole. Presents today for f/up after seen at [facility] ER on 11/18/21 with Edema LE's, dyspnea, cough:\par \par Impression: CHF\par -EKG done: New onset A.Fib\par -Lab drawn\par -Start lasix 20mg daily\par -Advise cardiology eval ASAP\par -F/up in 2 weeks\par -Further recommendations with lab results..\par \par HTN:\par -Continue with enalapril and metoprolol\par \par Hypothyroidism:\par -Continue levothyroxine\par \par

## 2021-12-08 NOTE — PLAN
[FreeTextEntry1] : 2 PM: Call Dr after reviewing EKG> Advise ER [facility].\par [facility] call, spoke with Celestina.\par Daughter will bring pt to ER

## 2021-12-08 NOTE — H&P ADULT - HISTORY OF PRESENT ILLNESS
pt. is  an 89 y/o Female with history of hypertension, CVA no residual deficits , atrial fibrillation not on anticoagulation due to history of ICH in year 2000 as per pt's granddaughter ,  who presents with LE swelling for the last three weeks and also, dry coug on and off, sob with activity, no fever.  sob better at rest, sleeps with head up. As per family no prior h/o chf. [facility]st hospital admission was more [facility] a year ago but as per family had ER visits in july and november this year for uti/ pneumonia. denies chest pain, palpitations, dizziness or lightheadedness.  no abd. pain, no n/v/d. reports urine urgency but no pain with urination, pt's u/a is pending at the time of admission, afebrile in the ER.  pt. is  an 89 y/o Female with history of hypertension, CVA no residual deficits , atrial fibrillation not on anticoagulation due to history of ICH in year 2000 as per pt's granddaughter ,  who presents with LE swelling for the last three weeks and also, dry coug on and off, sob with activity, no fever.  sob better at rest, sleeps with head up. As per family no prior h/o chf. [facility]st hospital admission was more [facility] a year ago but as per family had ER visits in july and november this year for uti/ pneumonia. denies chest pain, palpitations, dizziness or lightheadedness.  no abd. pain, no n/v/d. reports urine urgency but no pain with urination, pt's u/a is pending at the time of admission, afebrile in the ER. no swallowing difficulty reported but appetite is poor.

## 2021-12-08 NOTE — PHYSICAL EXAM
[No Acute Distress] : no acute distress [Well Nourished] : well nourished [Well Developed] : well developed [Well-Appearing] : well-appearing [Normal Sclera/Conjunctiva] : normal sclera/conjunctiva [PERRL] : pupils equal round and reactive to light [EOMI] : extraocular movements intact [Normal Outer Ear/Nose] : the outer ears and nose were normal in appearance [Normal Oropharynx] : the oropharynx was normal [No JVD] : no jugular venous distention [No Lymphadenopathy] : no lymphadenopathy [Supple] : supple [Thyroid Normal, No Nodules] : the thyroid was normal and there were no nodules present [No Respiratory Distress] : no respiratory distress  [No Accessory Muscle Use] : no accessory muscle use [Clear to Auscultation] : lungs were clear to auscultation bilaterally [Normal Rate] : normal rate  [Regular Rhythm] : with a regular rhythm [Normal S1, S2] : normal S1 and S2 [No Murmur] : no murmur heard [No Carotid Bruits] : no carotid bruits [No Abdominal Bruit] : a ~M bruit was not heard ~T in the abdomen [No Varicosities] : no varicosities [No Palpable Aorta] : no palpable aorta [No Extremity Clubbing/Cyanosis] : no extremity clubbing/cyanosis [Soft] : abdomen soft [Non Tender] : non-tender [Non-distended] : non-distended [No Masses] : no abdominal mass palpated [No HSM] : no HSM [Normal Bowel Sounds] : normal bowel sounds [Normal Posterior Cervical Nodes] : no posterior cervical lymphadenopathy [Normal Anterior Cervical Nodes] : no anterior cervical lymphadenopathy [No CVA Tenderness] : no CVA  tenderness [No Spinal Tenderness] : no spinal tenderness [No Joint Swelling] : no joint swelling [Grossly Normal Strength/Tone] : grossly normal strength/tone [No Rash] : no rash [Coordination Grossly Intact] : coordination grossly intact [No Focal Deficits] : no focal deficits [Normal Gait] : normal gait [Deep Tendon Reflexes (DTR)] : deep tendon reflexes were 2+ and symmetric [Normal Affect] : the affect was normal [Normal Insight/Judgement] : insight and judgment were intact [___ +] : bilateral [unfilled]U+ pretibial pitting edema

## 2021-12-09 ENCOUNTER — TRANSCRIPTION ENCOUNTER (OUTPATIENT)
Age: 86
End: 2021-12-09

## 2021-12-09 DIAGNOSIS — R60.0 LOCALIZED EDEMA: ICD-10-CM

## 2021-12-09 DIAGNOSIS — E03.9 HYPOTHYROIDISM, UNSPECIFIED: ICD-10-CM

## 2021-12-09 DIAGNOSIS — I50.9 HEART FAILURE, UNSPECIFIED: ICD-10-CM

## 2021-12-09 DIAGNOSIS — I10 ESSENTIAL (PRIMARY) HYPERTENSION: ICD-10-CM

## 2021-12-09 DIAGNOSIS — E87.6 HYPOKALEMIA: ICD-10-CM

## 2021-12-09 DIAGNOSIS — I48.20 CHRONIC ATRIAL FIBRILLATION, UNSPECIFIED: ICD-10-CM

## 2021-12-09 LAB
ANION GAP SERPL CALC-SCNC: 15 MMOL/L
ANION GAP SERPL CALC-SCNC: 17 MMOL/L — SIGNIFICANT CHANGE UP (ref 5–17)
APPEARANCE UR: CLEAR — SIGNIFICANT CHANGE UP
BACTERIA # UR AUTO: ABNORMAL
BASOPHILS # BLD AUTO: 0.07 K/UL — SIGNIFICANT CHANGE UP (ref 0–0.2)
BASOPHILS NFR BLD AUTO: 0.8 % — SIGNIFICANT CHANGE UP (ref 0–2)
BILIRUB UR-MCNC: NEGATIVE — SIGNIFICANT CHANGE UP
BUN SERPL-MCNC: 10 MG/DL
BUN SERPL-MCNC: 18.2 MG/DL — SIGNIFICANT CHANGE UP (ref 8–20)
CALCIUM SERPL-MCNC: 9 MG/DL — SIGNIFICANT CHANGE UP (ref 8.6–10.2)
CALCIUM SERPL-MCNC: 9.6 MG/DL
CHLORIDE SERPL-SCNC: 102 MMOL/L
CHLORIDE SERPL-SCNC: 103 MMOL/L — SIGNIFICANT CHANGE UP (ref 98–107)
CO2 SERPL-SCNC: 21 MMOL/L — LOW (ref 22–29)
CO2 SERPL-SCNC: 23 MMOL/L
COLOR SPEC: YELLOW — SIGNIFICANT CHANGE UP
CREAT SERPL-MCNC: 0.6 MG/DL
CREAT SERPL-MCNC: 0.64 MG/DL — SIGNIFICANT CHANGE UP (ref 0.5–1.3)
DIFF PNL FLD: ABNORMAL
EOSINOPHIL # BLD AUTO: 0.18 K/UL — SIGNIFICANT CHANGE UP (ref 0–0.5)
EOSINOPHIL NFR BLD AUTO: 2 % — SIGNIFICANT CHANGE UP (ref 0–6)
EPI CELLS # UR: SIGNIFICANT CHANGE UP
GLUCOSE SERPL-MCNC: 74 MG/DL — SIGNIFICANT CHANGE UP (ref 70–99)
GLUCOSE SERPL-MCNC: 98 MG/DL
GLUCOSE UR QL: NEGATIVE MG/DL — SIGNIFICANT CHANGE UP
HCT VFR BLD CALC: 35.4 % — SIGNIFICANT CHANGE UP (ref 34.5–45)
HGB BLD-MCNC: 10.8 G/DL — LOW (ref 11.5–15.5)
IMM GRANULOCYTES NFR BLD AUTO: 0.6 % — SIGNIFICANT CHANGE UP (ref 0–1.5)
KETONES UR-MCNC: NEGATIVE — SIGNIFICANT CHANGE UP
LEUKOCYTE ESTERASE UR-ACNC: NEGATIVE — SIGNIFICANT CHANGE UP
LYMPHOCYTES # BLD AUTO: 1.28 K/UL — SIGNIFICANT CHANGE UP (ref 1–3.3)
LYMPHOCYTES # BLD AUTO: 14.2 % — SIGNIFICANT CHANGE UP (ref 13–44)
MAGNESIUM SERPL-MCNC: 1.8 MG/DL — SIGNIFICANT CHANGE UP (ref 1.6–2.6)
MAGNESIUM SERPL-MCNC: 2 MG/DL — SIGNIFICANT CHANGE UP (ref 1.6–2.6)
MCHC RBC-ENTMCNC: 26.7 PG — LOW (ref 27–34)
MCHC RBC-ENTMCNC: 30.5 GM/DL — LOW (ref 32–36)
MCV RBC AUTO: 87.6 FL — SIGNIFICANT CHANGE UP (ref 80–100)
MONOCYTES # BLD AUTO: 0.75 K/UL — SIGNIFICANT CHANGE UP (ref 0–0.9)
MONOCYTES NFR BLD AUTO: 8.3 % — SIGNIFICANT CHANGE UP (ref 2–14)
NEUTROPHILS # BLD AUTO: 6.68 K/UL — SIGNIFICANT CHANGE UP (ref 1.8–7.4)
NEUTROPHILS NFR BLD AUTO: 74.1 % — SIGNIFICANT CHANGE UP (ref 43–77)
NITRITE UR-MCNC: NEGATIVE — SIGNIFICANT CHANGE UP
PH UR: 7 — SIGNIFICANT CHANGE UP (ref 5–8)
PLATELET # BLD AUTO: 321 K/UL — SIGNIFICANT CHANGE UP (ref 150–400)
POTASSIUM SERPL-MCNC: 3.4 MMOL/L — LOW (ref 3.5–5.3)
POTASSIUM SERPL-SCNC: 3.4 MMOL/L — LOW (ref 3.5–5.3)
POTASSIUM SERPL-SCNC: 4.2 MMOL/L
PROT UR-MCNC: NEGATIVE MG/DL — SIGNIFICANT CHANGE UP
RBC # BLD: 4.04 M/UL — SIGNIFICANT CHANGE UP (ref 3.8–5.2)
RBC # FLD: 14.9 % — HIGH (ref 10.3–14.5)
RBC CASTS # UR COMP ASSIST: ABNORMAL /HPF (ref 0–4)
SODIUM SERPL-SCNC: 140 MMOL/L
SODIUM SERPL-SCNC: 141 MMOL/L — SIGNIFICANT CHANGE UP (ref 135–145)
SP GR SPEC: 1.01 — SIGNIFICANT CHANGE UP (ref 1.01–1.02)
TROPONIN T SERPL-MCNC: 0.04 NG/ML — SIGNIFICANT CHANGE UP (ref 0–0.06)
UROBILINOGEN FLD QL: NEGATIVE MG/DL — SIGNIFICANT CHANGE UP
WBC # BLD: 9.01 K/UL — SIGNIFICANT CHANGE UP (ref 3.8–10.5)
WBC # FLD AUTO: 9.01 K/UL — SIGNIFICANT CHANGE UP (ref 3.8–10.5)
WBC UR QL: SIGNIFICANT CHANGE UP

## 2021-12-09 PROCEDURE — 99233 SBSQ HOSP IP/OBS HIGH 50: CPT

## 2021-12-09 PROCEDURE — 93306 TTE W/DOPPLER COMPLETE: CPT | Mod: 26

## 2021-12-09 RX ORDER — HEPARIN SODIUM 5000 [USP'U]/ML
5000 INJECTION INTRAVENOUS; SUBCUTANEOUS EVERY 12 HOURS
Refills: 0 | Status: DISCONTINUED | OUTPATIENT
Start: 2021-12-09 | End: 2021-12-12

## 2021-12-09 RX ORDER — IPRATROPIUM/ALBUTEROL SULFATE 18-103MCG
3 AEROSOL WITH ADAPTER (GRAM) INHALATION EVERY 6 HOURS
Refills: 0 | Status: DISCONTINUED | OUTPATIENT
Start: 2021-12-09 | End: 2021-12-12

## 2021-12-09 RX ORDER — LOSARTAN POTASSIUM 100 MG/1
25 TABLET, FILM COATED ORAL DAILY
Refills: 0 | Status: DISCONTINUED | OUTPATIENT
Start: 2021-12-09 | End: 2021-12-12

## 2021-12-09 RX ORDER — QUETIAPINE FUMARATE 200 MG/1
12.5 TABLET, FILM COATED ORAL DAILY
Refills: 0 | Status: DISCONTINUED | OUTPATIENT
Start: 2021-12-09 | End: 2021-12-12

## 2021-12-09 RX ORDER — FUROSEMIDE 40 MG
40 TABLET ORAL
Refills: 0 | Status: DISCONTINUED | OUTPATIENT
Start: 2021-12-09 | End: 2021-12-10

## 2021-12-09 RX ADMIN — Medication 40 MILLIGRAM(S): at 17:34

## 2021-12-09 RX ADMIN — HEPARIN SODIUM 5000 UNIT(S): 5000 INJECTION INTRAVENOUS; SUBCUTANEOUS at 17:37

## 2021-12-09 RX ADMIN — Medication 100 MILLIEQUIVALENT(S): at 00:50

## 2021-12-09 RX ADMIN — Medication 1000 UNIT(S): at 10:15

## 2021-12-09 RX ADMIN — Medication 100 MILLIEQUIVALENT(S): at 02:27

## 2021-12-09 RX ADMIN — Medication 40 MILLIGRAM(S): at 06:22

## 2021-12-09 RX ADMIN — HEPARIN SODIUM 5000 UNIT(S): 5000 INJECTION INTRAVENOUS; SUBCUTANEOUS at 06:22

## 2021-12-09 RX ADMIN — QUETIAPINE FUMARATE 12.5 MILLIGRAM(S): 200 TABLET, FILM COATED ORAL at 17:33

## 2021-12-09 RX ADMIN — Medication 25 MILLIGRAM(S): at 06:23

## 2021-12-09 RX ADMIN — Medication 50 MICROGRAM(S): at 06:21

## 2021-12-09 RX ADMIN — PANTOPRAZOLE SODIUM 40 MILLIGRAM(S): 20 TABLET, DELAYED RELEASE ORAL at 06:21

## 2021-12-09 RX ADMIN — LOSARTAN POTASSIUM 25 MILLIGRAM(S): 100 TABLET, FILM COATED ORAL at 17:33

## 2021-12-09 RX ADMIN — Medication 1 TABLET(S): at 10:15

## 2021-12-09 NOTE — SWALLOW BEDSIDE ASSESSMENT ADULT - SLP GENERAL OBSERVATIONS
Pt received A&A upright in stretcher, daughter present, reduced orientation, +agitation, +hoarse vocal quality, +low vocal volume, 0/10 pain pre & post eval

## 2021-12-09 NOTE — PATIENT PROFILE ADULT - FALL HARM RISK - HARM RISK INTERVENTIONS
Assistance OOB with selected safe patient handling equipment/Communicate Risk of Fall with Harm to all staff/Discuss with provider need for PT consult/Monitor gait and stability/Provide patient with walking aids - walker, cane, crutches/Reinforce activity limits and safety measures with patient and family/Tailored Fall Risk Interventions/Use of alarms - bed, chair and/or voice tab/Visual Cue: Yellow wristband and red socks/Bed in lowest position, wheels locked, appropriate side rails in place/Call bell, personal items and telephone in reach/Instruct patient to call for assistance before getting out of bed or chair/Non-slip footwear when patient is out of bed/[place] to call system/Physically safe environment - no spills, clutter or unnecessary equipment/Purposeful Proactive Rounding/Room/bathroom lighting operational, light cord in reach

## 2021-12-09 NOTE — SWALLOW BEDSIDE ASSESSMENT ADULT - SWALLOW EVAL: DIAGNOSIS
Due to reduced participation, eval was limited; therefore, unable to r/o dysphagia with all consistencies & would defer diet to MD. Oral stage notable for suspect posterior loss with thin & mildly thick liquids. Suspect pharyngeal dysphagia with thin liquids due to +throat clear post swallow & +wet vocal quality post swallow. Suspect pharyngeal dysphagia with mildly thick liquids due to +throat clear post swallow. Unable to assess moderately thick liquids due to pt refusal

## 2021-12-09 NOTE — SWALLOW BEDSIDE ASSESSMENT ADULT - SLP PERTINENT HISTORY OF CURRENT PROBLEM
As per charting, "pt. is  an 88 y/o Female with history of hypertension, CVA no residual deficits , atrial fibrillation not on anticoagulation due to history of ICH in year 2000 as per pt's granddaughter ,  who presents with LE swelling for the last three weeks and also, dry coug on and off, sob with activity, no fever.  sob better at rest, sleeps with head up. As per family no prior h/o chf. [place]st hospital admission was more [facility] a year ago but as per family had ER visits in july and november this year for uti/ pneumonia. denies chest pain, palpitations, dizziness or lightheadedness.  no abd. pain, no n/v/d. reports urine urgency but no pain with urination, pt's u/a is pending at the time of admission, afebrile in the ER. no swallowing difficulty reported but appetite is poor. "

## 2021-12-09 NOTE — SWALLOW BEDSIDE ASSESSMENT ADULT - ORAL PREPARATORY PHASE
Within functional limits significantly reduced mandibular depression accepting 1/8 tsp of PO; refusing any additional PO despite max encouragement from clinician & daughter/Reduced oral grading

## 2021-12-09 NOTE — SWALLOW BEDSIDE ASSESSMENT ADULT - COMMENTS
Moderately thick liquids not assessed due to pt refusal Daughter reported that pt sees ENT regularly. Noting pt had swallow test done in the past that was grossly unremarkable; however, unable to state specific test. She reported her mother was rx'd for MBS in the past; however, test not completed.

## 2021-12-09 NOTE — SWALLOW BEDSIDE ASSESSMENT ADULT - PHARYNGEAL PHASE
Wet vocal quality post oral intake/Throat clear post oral intake unable to r/o dysphagia due to limited trial Throat clear post oral intake

## 2021-12-09 NOTE — DISCHARGE NOTE NURSING/CASE MANAGEMENT/SOCIAL WORK - PATIENT PORTAL LINK FT
You can access the FollowMyHealth Patient Portal offered by [facility] by registering at the following website: [email or web address]. By joining [employer]’s FollowMyHealth portal, you will also be able to view your health information using other applications (apps) compatible with our system.

## 2021-12-09 NOTE — DISCHARGE NOTE NURSING/CASE MANAGEMENT/SOCIAL WORK - NSDCFUADDAPPT_GEN_ALL_CORE_FT
Appt with [facility] at  Will accept [facility] meds to beds. [facility] will follow up with you upon d/c to make an appt.  Will accept [facility] meds to beds. [facility] will follow up with you upon d/c to make an   SNF facility will manage medications

## 2021-12-09 NOTE — DISCHARGE NOTE NURSING/CASE MANAGEMENT/SOCIAL WORK - NSDCPEFALRISK_GEN_ALL_CORE
For information on Fall & Injury Prevention, visit: [email or web address] OR  [email or web address] OR  https://www.cdc.gov/steadi/patient.html

## 2021-12-10 DIAGNOSIS — I50.21 ACUTE SYSTOLIC (CONGESTIVE) HEART FAILURE: ICD-10-CM

## 2021-12-10 LAB
CULTURE RESULTS: SIGNIFICANT CHANGE UP
SPECIMEN SOURCE: SIGNIFICANT CHANGE UP

## 2021-12-10 PROCEDURE — 99233 SBSQ HOSP IP/OBS HIGH 50: CPT

## 2021-12-10 RX ORDER — HALOPERIDOL DECANOATE 100 MG/ML
2.5 INJECTION INTRAMUSCULAR ONCE
Refills: 0 | Status: COMPLETED | OUTPATIENT
Start: 2021-12-10 | End: 2021-12-10

## 2021-12-10 RX ORDER — FUROSEMIDE 40 MG
40 TABLET ORAL DAILY
Refills: 0 | Status: DISCONTINUED | OUTPATIENT
Start: 2021-12-10 | End: 2021-12-11

## 2021-12-10 RX ORDER — SPIRONOLACTONE 25 MG/1
25 TABLET, FILM COATED ORAL DAILY
Refills: 0 | Status: DISCONTINUED | OUTPATIENT
Start: 2021-12-10 | End: 2021-12-12

## 2021-12-10 RX ORDER — ASPIRIN/CALCIUM CARB/MAGNESIUM 324 MG
81 TABLET ORAL DAILY
Refills: 0 | Status: DISCONTINUED | OUTPATIENT
Start: 2021-12-10 | End: 2021-12-12

## 2021-12-10 RX ADMIN — PANTOPRAZOLE SODIUM 40 MILLIGRAM(S): 20 TABLET, DELAYED RELEASE ORAL at 06:58

## 2021-12-10 RX ADMIN — Medication 40 MILLIGRAM(S): at 05:41

## 2021-12-10 RX ADMIN — Medication 1000 UNIT(S): at 10:47

## 2021-12-10 RX ADMIN — QUETIAPINE FUMARATE 12.5 MILLIGRAM(S): 200 TABLET, FILM COATED ORAL at 10:47

## 2021-12-10 RX ADMIN — HEPARIN SODIUM 5000 UNIT(S): 5000 INJECTION INTRAVENOUS; SUBCUTANEOUS at 16:31

## 2021-12-10 RX ADMIN — Medication 25 MILLIGRAM(S): at 05:41

## 2021-12-10 RX ADMIN — HEPARIN SODIUM 5000 UNIT(S): 5000 INJECTION INTRAVENOUS; SUBCUTANEOUS at 05:42

## 2021-12-10 RX ADMIN — Medication 81 MILLIGRAM(S): at 16:31

## 2021-12-10 RX ADMIN — Medication 1 TABLET(S): at 10:46

## 2021-12-10 RX ADMIN — FAMOTIDINE 20 MILLIGRAM(S): 10 INJECTION INTRAVENOUS at 22:15

## 2021-12-10 RX ADMIN — Medication 50 MICROGRAM(S): at 10:46

## 2021-12-10 RX ADMIN — LOSARTAN POTASSIUM 25 MILLIGRAM(S): 100 TABLET, FILM COATED ORAL at 05:42

## 2021-12-10 RX ADMIN — HALOPERIDOL DECANOATE 2.5 MILLIGRAM(S): 100 INJECTION INTRAMUSCULAR at 04:07

## 2021-12-10 NOTE — DIETITIAN INITIAL EVALUATION ADULT. - ETIOLOGY
Related to inability to meet sufficient protein energy requirements in setting of advanced age with persistent lack of appetite and admission with CHF

## 2021-12-10 NOTE — PROGRESS NOTE ADULT - PROBLEM SELECTOR PLAN 4
Rate controlled on Metoprolol   Not on anticoagulation secondary to hx of bleed
C/w Levothyroxine   Follow TSH, was not done this morning

## 2021-12-10 NOTE — PROGRESS NOTE ADULT - PROBLEM SELECTOR PLAN 1
Concern for acute heart failure, no prior diagnosis   TTE done and awaiting results   C/w Furosemide 40 mg IV twice daily   c/w Metoprolol 25 mg and Losartan 25 mg po daily   Monitor daily weight and I&O  Get speech and swallow evaluation
TTE done results reviewed   EF 35-45 %,. 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 The LV aneurysm is filled with thrombus measuring 2.8 x 1.6 cm.  LE edema significantly improved   Furosemide switched To PO 40 mg po daily    c/w Metoprolol 25 mg and Losartan 25 mg po daily   Not good candidate for long term AC due to bleeding and fall risk   Started aspirin   Monitor daily weight and I&O  Plan for discharge in AM

## 2021-12-10 NOTE — PROGRESS NOTE ADULT - PROBLEM SELECTOR PLAN 5
Rate controlled on Metoprolol   Not on anticoagulation secondary to hx of bleed
k replaced, mag to follow, keep k above 4 and mag above 2.

## 2021-12-10 NOTE — DIETITIAN INITIAL EVALUATION ADULT. - PERTINENT LABORATORY DATA
12-09 Na141 mmol/L Glu 74 mg/dL K+ 3.4 mmol/L<L> Cr  0.64 mg/dL BUN 18.2 mg/dL Phos n/a   Alb n/a   PAB n/a

## 2021-12-10 NOTE — DIETITIAN INITIAL EVALUATION ADULT. - OTHER INFO
Pt is an 89 y/o Female with history of hypertension, CVA no residual deficits , atrial fibrillation not on anticoagulation due to history of ICH in year 2000 as per pt's granddaughter ,  who presents with LE swelling for the last three weeks and also, dry cough on and off, sob with activity, no fever.  sob better at rest, sleeps with head up. As per family no prior h/o chf. LAst hospital admission was more [facility] a year ago but as per family had ER visits in july and november this year for uti/ pneumonia. denies chest pain, palpitations, dizziness or lightheadedness.  no abd. pain, no n/v/d. reports urine urgency but no pain with urination, pt's u/a is pending at the time of admission, afebrile in the ER. no swallowing difficulty reported but appetite is poor.   Pt admitted with acute heart failure likely HFpEF. Pt is a poor historian, unable to provide weight history. Pt continues to have poor appetite/PO intake noted. Pt had swallow evaluation on 12/9; pt with poor participation during evaluation and diet deferred to MD. Diet now advanced to Soft/bite sized. NFPE conducted.

## 2021-12-11 LAB
ANION GAP SERPL CALC-SCNC: 12 MMOL/L — SIGNIFICANT CHANGE UP (ref 5–17)
ANION GAP SERPL CALC-SCNC: 13 MMOL/L — SIGNIFICANT CHANGE UP (ref 5–17)
BUN SERPL-MCNC: 12 MG/DL — SIGNIFICANT CHANGE UP (ref 8–20)
BUN SERPL-MCNC: 12.4 MG/DL — SIGNIFICANT CHANGE UP (ref 8–20)
CALCIUM SERPL-MCNC: 8.3 MG/DL — LOW (ref 8.6–10.2)
CALCIUM SERPL-MCNC: 8.4 MG/DL — LOW (ref 8.6–10.2)
CHLORIDE SERPL-SCNC: 96 MMOL/L — LOW (ref 98–107)
CHLORIDE SERPL-SCNC: 97 MMOL/L — LOW (ref 98–107)
CO2 SERPL-SCNC: 29 MMOL/L — SIGNIFICANT CHANGE UP (ref 22–29)
CO2 SERPL-SCNC: 31 MMOL/L — HIGH (ref 22–29)
CREAT SERPL-MCNC: 0.67 MG/DL — SIGNIFICANT CHANGE UP (ref 0.5–1.3)
CREAT SERPL-MCNC: 0.72 MG/DL — SIGNIFICANT CHANGE UP (ref 0.5–1.3)
GLUCOSE SERPL-MCNC: 136 MG/DL — HIGH (ref 70–99)
GLUCOSE SERPL-MCNC: 171 MG/DL — HIGH (ref 70–99)
HCT VFR BLD CALC: 37.2 % — SIGNIFICANT CHANGE UP (ref 34.5–45)
HGB BLD-MCNC: 11.6 G/DL — SIGNIFICANT CHANGE UP (ref 11.5–15.5)
MAGNESIUM SERPL-MCNC: 1.6 MG/DL — SIGNIFICANT CHANGE UP (ref 1.6–2.6)
MAGNESIUM SERPL-MCNC: 2.2 MG/DL — SIGNIFICANT CHANGE UP (ref 1.6–2.6)
MCHC RBC-ENTMCNC: 26.1 PG — LOW (ref 27–34)
MCHC RBC-ENTMCNC: 31.2 GM/DL — LOW (ref 32–36)
MCV RBC AUTO: 83.6 FL — SIGNIFICANT CHANGE UP (ref 80–100)
PHOSPHATE SERPL-MCNC: 3.1 MG/DL — SIGNIFICANT CHANGE UP (ref 2.4–4.7)
PLATELET # BLD AUTO: 430 K/UL — HIGH (ref 150–400)
POTASSIUM SERPL-MCNC: 2.7 MMOL/L — CRITICAL LOW (ref 3.5–5.3)
POTASSIUM SERPL-MCNC: 3.8 MMOL/L — SIGNIFICANT CHANGE UP (ref 3.5–5.3)
POTASSIUM SERPL-SCNC: 2.7 MMOL/L — CRITICAL LOW (ref 3.5–5.3)
POTASSIUM SERPL-SCNC: 3.8 MMOL/L — SIGNIFICANT CHANGE UP (ref 3.5–5.3)
RBC # BLD: 4.45 M/UL — SIGNIFICANT CHANGE UP (ref 3.8–5.2)
RBC # FLD: 14.7 % — HIGH (ref 10.3–14.5)
SODIUM SERPL-SCNC: 138 MMOL/L — SIGNIFICANT CHANGE UP (ref 135–145)
SODIUM SERPL-SCNC: 140 MMOL/L — SIGNIFICANT CHANGE UP (ref 135–145)
WBC # BLD: 10.78 K/UL — HIGH (ref 3.8–10.5)
WBC # FLD AUTO: 10.78 K/UL — HIGH (ref 3.8–10.5)

## 2021-12-11 PROCEDURE — 99233 SBSQ HOSP IP/OBS HIGH 50: CPT

## 2021-12-11 RX ORDER — DRONABINOL 2.5 MG
2.5 CAPSULE ORAL DAILY
Refills: 0 | Status: DISCONTINUED | OUTPATIENT
Start: 2021-12-11 | End: 2021-12-12

## 2021-12-11 RX ORDER — MAGNESIUM SULFATE 500 MG/ML
2 VIAL (ML) INJECTION ONCE
Refills: 0 | Status: COMPLETED | OUTPATIENT
Start: 2021-12-11 | End: 2021-12-11

## 2021-12-11 RX ORDER — POTASSIUM CHLORIDE 20 MEQ
40 PACKET (EA) ORAL
Refills: 0 | Status: COMPLETED | OUTPATIENT
Start: 2021-12-11 | End: 2021-12-12

## 2021-12-11 RX ORDER — MAGNESIUM SULFATE 500 MG/ML
2 VIAL (ML) INJECTION ONCE
Refills: 0 | Status: DISCONTINUED | OUTPATIENT
Start: 2021-12-11 | End: 2021-12-11

## 2021-12-11 RX ORDER — POTASSIUM CHLORIDE 20 MEQ
10 PACKET (EA) ORAL
Refills: 0 | Status: COMPLETED | OUTPATIENT
Start: 2021-12-11 | End: 2021-12-11

## 2021-12-11 RX ADMIN — Medication 25 GRAM(S): at 12:42

## 2021-12-11 RX ADMIN — Medication 2.5 MILLIGRAM(S): at 22:22

## 2021-12-11 RX ADMIN — Medication 50 MICROGRAM(S): at 06:46

## 2021-12-11 RX ADMIN — LOSARTAN POTASSIUM 25 MILLIGRAM(S): 100 TABLET, FILM COATED ORAL at 06:46

## 2021-12-11 RX ADMIN — Medication 100 MILLIEQUIVALENT(S): at 14:58

## 2021-12-11 RX ADMIN — Medication 100 MILLIEQUIVALENT(S): at 16:36

## 2021-12-11 RX ADMIN — PANTOPRAZOLE SODIUM 40 MILLIGRAM(S): 20 TABLET, DELAYED RELEASE ORAL at 06:46

## 2021-12-11 RX ADMIN — SPIRONOLACTONE 25 MILLIGRAM(S): 25 TABLET, FILM COATED ORAL at 06:46

## 2021-12-11 RX ADMIN — Medication 40 MILLIEQUIVALENT(S): at 17:50

## 2021-12-11 RX ADMIN — HEPARIN SODIUM 5000 UNIT(S): 5000 INJECTION INTRAVENOUS; SUBCUTANEOUS at 06:34

## 2021-12-11 RX ADMIN — Medication 81 MILLIGRAM(S): at 11:19

## 2021-12-11 RX ADMIN — Medication 25 GRAM(S): at 01:00

## 2021-12-11 RX ADMIN — Medication 1000 UNIT(S): at 11:19

## 2021-12-11 RX ADMIN — QUETIAPINE FUMARATE 12.5 MILLIGRAM(S): 200 TABLET, FILM COATED ORAL at 17:50

## 2021-12-11 RX ADMIN — Medication 1 TABLET(S): at 11:19

## 2021-12-11 RX ADMIN — FAMOTIDINE 20 MILLIGRAM(S): 10 INJECTION INTRAVENOUS at 17:50

## 2021-12-11 RX ADMIN — Medication 40 MILLIGRAM(S): at 06:35

## 2021-12-11 RX ADMIN — Medication 100 MILLIEQUIVALENT(S): at 14:05

## 2021-12-11 RX ADMIN — HEPARIN SODIUM 5000 UNIT(S): 5000 INJECTION INTRAVENOUS; SUBCUTANEOUS at 17:50

## 2021-12-11 RX ADMIN — Medication 25 MILLIGRAM(S): at 06:34

## 2021-12-11 NOTE — PROGRESS NOTE ADULT - ASSESSMENT
pt. is  an 89 y/o Female with history of hypertension, CVA no residual deficits , atrial fibrillation not on anticoagulation due to history of ICH in year 2000 as per pt's granddaughter ,  who presents with LE swelling for the last three weeks and also, dry coug on and off, sob with activity, no fever.  sob better at rest, sleeps with head up. As per family no prior h/o chf. LAst hospital admission was more [facility] a year ago but as per family had ER visits in july and november this year for uti/ pneumonia. denies chest pain, palpitations, dizziness or lightheadedness.  no abd. pain, no n/v/d. reports urine urgency but no pain with urination, pt's u/a is pending at the time of admission, afebrile in the ER. no swallowing difficulty reported but appetite is poor.     # Arrythmia- Wide complex non sustained beats over variable time periods, multiple episodes and recurrent very frequently on Telemetry  STAT EKG- A fib with PVC  D/W Cards if anti arrhythmics other than BB needs to be considered  STAT labs revealed electrolyte imbalance  Etiology deemed probable recent MI given new finding of LV aneurysm as compared to 2019    # Electrolyte imbalance- Hypokalemia, hypomagnesemia  Replete aggressively    # AE CHFrEF  TTE - EF 35-45 %,. 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 The LV aneurysm is filled with thrombus measuring 2.8 x 1.6 cm. Onel pl effusion  LE edema significantly improved   Furosemide switched To PO 40 mg po daily    c/w Metoprolol 25 mg and Losartan 25 mg po daily   not on anticoagulation due to history of ICH  Started aspirin   Monitor daily weight and I&O    # Leg edema  improving  leg elevation  ACE wraps  DVT ruled out    # HTN, Hypothyroid,   BP controlled   c/w Metoprolol and Losartan    # Chr A fib with fair rate control in 90s to low 100s range  on Metoprolol   Not on anticoagulation secondary to hx of ICH    # Metabolic Alkalosis  sec to diuresis  Will stop lasix for now  can restart as o/p if required    # Per family poor appetite, requesting appetite stimulants  start Marinol    # Heparin      GoC- DNR  Plan- replete lytes, swallow eval was incomplete. currently on thickened liq.  Dispo- needs PT eval. Home most likely

## 2021-12-12 LAB
ANION GAP SERPL CALC-SCNC: 15 MMOL/L — SIGNIFICANT CHANGE UP (ref 5–17)
BUN SERPL-MCNC: 11.8 MG/DL — SIGNIFICANT CHANGE UP (ref 8–20)
CALCIUM SERPL-MCNC: 9 MG/DL — SIGNIFICANT CHANGE UP (ref 8.6–10.2)
CHLORIDE SERPL-SCNC: 98 MMOL/L — SIGNIFICANT CHANGE UP (ref 98–107)
CO2 SERPL-SCNC: 20 MMOL/L — LOW (ref 22–29)
CREAT SERPL-MCNC: 0.6 MG/DL — SIGNIFICANT CHANGE UP (ref 0.5–1.3)
GLUCOSE SERPL-MCNC: 171 MG/DL — HIGH (ref 70–99)
HCT VFR BLD CALC: 42.7 % — SIGNIFICANT CHANGE UP (ref 34.5–45)
HGB BLD-MCNC: 13 G/DL — SIGNIFICANT CHANGE UP (ref 11.5–15.5)
MAGNESIUM SERPL-MCNC: 2.2 MG/DL — SIGNIFICANT CHANGE UP (ref 1.8–2.6)
MCHC RBC-ENTMCNC: 26.5 PG — LOW (ref 27–34)
MCHC RBC-ENTMCNC: 30.4 GM/DL — LOW (ref 32–36)
MCV RBC AUTO: 87 FL — SIGNIFICANT CHANGE UP (ref 80–100)
PLATELET # BLD AUTO: 326 K/UL — SIGNIFICANT CHANGE UP (ref 150–400)
POTASSIUM SERPL-MCNC: 4.8 MMOL/L — SIGNIFICANT CHANGE UP (ref 3.5–5.3)
POTASSIUM SERPL-SCNC: 4.8 MMOL/L — SIGNIFICANT CHANGE UP (ref 3.5–5.3)
RBC # BLD: 4.91 M/UL — SIGNIFICANT CHANGE UP (ref 3.8–5.2)
RBC # FLD: 14.8 % — HIGH (ref 10.3–14.5)
SODIUM SERPL-SCNC: 133 MMOL/L — LOW (ref 135–145)
T4 AB SER-ACNC: 7.9 UG/DL — SIGNIFICANT CHANGE UP (ref 4.5–12)
TSH SERPL-MCNC: 2.63 UIU/ML — SIGNIFICANT CHANGE UP (ref 0.27–4.2)
WBC # BLD: 13.08 K/UL — HIGH (ref 3.8–10.5)
WBC # FLD AUTO: 13.08 K/UL — HIGH (ref 3.8–10.5)

## 2021-12-12 PROCEDURE — 99233 SBSQ HOSP IP/OBS HIGH 50: CPT

## 2021-12-12 RX ORDER — QUETIAPINE FUMARATE 200 MG/1
12.5 TABLET, FILM COATED ORAL DAILY
Refills: 0 | Status: DISCONTINUED | OUTPATIENT
Start: 2021-12-12 | End: 2021-12-15

## 2021-12-12 RX ORDER — DIGOXIN 250 MCG
500 TABLET ORAL ONCE
Refills: 0 | Status: COMPLETED | OUTPATIENT
Start: 2021-12-12 | End: 2021-12-12

## 2021-12-12 RX ORDER — HEPARIN SODIUM 5000 [USP'U]/ML
5000 INJECTION INTRAVENOUS; SUBCUTANEOUS EVERY 12 HOURS
Refills: 0 | Status: DISCONTINUED | OUTPATIENT
Start: 2021-12-12 | End: 2021-12-15

## 2021-12-12 RX ORDER — SPIRONOLACTONE 25 MG/1
25 TABLET, FILM COATED ORAL DAILY
Refills: 0 | Status: DISCONTINUED | OUTPATIENT
Start: 2021-12-12 | End: 2021-12-15

## 2021-12-12 RX ORDER — DILTIAZEM HCL 120 MG
10 CAPSULE, EXT RELEASE 24 HR ORAL ONCE
Refills: 0 | Status: COMPLETED | OUTPATIENT
Start: 2021-12-12 | End: 2021-12-12

## 2021-12-12 RX ORDER — ASPIRIN/CALCIUM CARB/MAGNESIUM 324 MG
81 TABLET ORAL DAILY
Refills: 0 | Status: DISCONTINUED | OUTPATIENT
Start: 2021-12-12 | End: 2021-12-15

## 2021-12-12 RX ORDER — PANTOPRAZOLE SODIUM 20 MG/1
40 TABLET, DELAYED RELEASE ORAL
Refills: 0 | Status: DISCONTINUED | OUTPATIENT
Start: 2021-12-12 | End: 2021-12-15

## 2021-12-12 RX ORDER — LEVOTHYROXINE SODIUM 125 MCG
50 TABLET ORAL DAILY
Refills: 0 | Status: DISCONTINUED | OUTPATIENT
Start: 2021-12-12 | End: 2021-12-15

## 2021-12-12 RX ORDER — FAMOTIDINE 10 MG/ML
20 INJECTION INTRAVENOUS AT BEDTIME
Refills: 0 | Status: DISCONTINUED | OUTPATIENT
Start: 2021-12-12 | End: 2021-12-15

## 2021-12-12 RX ORDER — FUROSEMIDE 40 MG
20 TABLET ORAL DAILY
Refills: 0 | Status: DISCONTINUED | OUTPATIENT
Start: 2021-12-12 | End: 2021-12-12

## 2021-12-12 RX ORDER — CHOLECALCIFEROL (VITAMIN D3) 125 MCG
1000 CAPSULE ORAL DAILY
Refills: 0 | Status: DISCONTINUED | OUTPATIENT
Start: 2021-12-12 | End: 2021-12-15

## 2021-12-12 RX ORDER — DRONABINOL 2.5 MG
2.5 CAPSULE ORAL DAILY
Refills: 0 | Status: DISCONTINUED | OUTPATIENT
Start: 2021-12-12 | End: 2021-12-15

## 2021-12-12 RX ORDER — DIGOXIN 250 MCG
250 TABLET ORAL ONCE
Refills: 0 | Status: COMPLETED | OUTPATIENT
Start: 2021-12-12 | End: 2021-12-12

## 2021-12-12 RX ORDER — LOSARTAN POTASSIUM 100 MG/1
25 TABLET, FILM COATED ORAL DAILY
Refills: 0 | Status: DISCONTINUED | OUTPATIENT
Start: 2021-12-12 | End: 2021-12-15

## 2021-12-12 RX ORDER — SODIUM CHLORIDE 9 MG/ML
1000 INJECTION INTRAMUSCULAR; INTRAVENOUS; SUBCUTANEOUS
Refills: 0 | Status: DISCONTINUED | OUTPATIENT
Start: 2021-12-12 | End: 2021-12-13

## 2021-12-12 RX ORDER — DILTIAZEM HCL 120 MG
10 CAPSULE, EXT RELEASE 24 HR ORAL EVERY 4 HOURS
Refills: 0 | Status: DISCONTINUED | OUTPATIENT
Start: 2021-12-12 | End: 2021-12-15

## 2021-12-12 RX ORDER — DILTIAZEM HCL 120 MG
30 CAPSULE, EXT RELEASE 24 HR ORAL EVERY 6 HOURS
Refills: 0 | Status: DISCONTINUED | OUTPATIENT
Start: 2021-12-12 | End: 2021-12-13

## 2021-12-12 RX ORDER — METOPROLOL TARTRATE 50 MG
25 TABLET ORAL DAILY
Refills: 0 | Status: DISCONTINUED | OUTPATIENT
Start: 2021-12-12 | End: 2021-12-12

## 2021-12-12 RX ADMIN — Medication 2.5 MILLIGRAM(S): at 17:25

## 2021-12-12 RX ADMIN — QUETIAPINE FUMARATE 12.5 MILLIGRAM(S): 200 TABLET, FILM COATED ORAL at 21:17

## 2021-12-12 RX ADMIN — FAMOTIDINE 20 MILLIGRAM(S): 10 INJECTION INTRAVENOUS at 21:17

## 2021-12-12 RX ADMIN — SODIUM CHLORIDE 60 MILLILITER(S): 9 INJECTION INTRAMUSCULAR; INTRAVENOUS; SUBCUTANEOUS at 16:33

## 2021-12-12 RX ADMIN — Medication 10 MILLIGRAM(S): at 16:57

## 2021-12-12 RX ADMIN — Medication 250 MICROGRAM(S): at 14:25

## 2021-12-12 RX ADMIN — SPIRONOLACTONE 25 MILLIGRAM(S): 25 TABLET, FILM COATED ORAL at 16:57

## 2021-12-12 RX ADMIN — Medication 1 TABLET(S): at 16:35

## 2021-12-12 RX ADMIN — SPIRONOLACTONE 25 MILLIGRAM(S): 25 TABLET, FILM COATED ORAL at 05:59

## 2021-12-12 RX ADMIN — Medication 500 MICROGRAM(S): at 15:29

## 2021-12-12 RX ADMIN — PANTOPRAZOLE SODIUM 40 MILLIGRAM(S): 20 TABLET, DELAYED RELEASE ORAL at 16:37

## 2021-12-12 RX ADMIN — LOSARTAN POTASSIUM 25 MILLIGRAM(S): 100 TABLET, FILM COATED ORAL at 16:58

## 2021-12-12 RX ADMIN — Medication 1000 UNIT(S): at 16:39

## 2021-12-12 RX ADMIN — Medication 81 MILLIGRAM(S): at 16:34

## 2021-12-12 RX ADMIN — HEPARIN SODIUM 5000 UNIT(S): 5000 INJECTION INTRAVENOUS; SUBCUTANEOUS at 05:56

## 2021-12-12 RX ADMIN — Medication 50 MICROGRAM(S): at 05:58

## 2021-12-12 RX ADMIN — HEPARIN SODIUM 5000 UNIT(S): 5000 INJECTION INTRAVENOUS; SUBCUTANEOUS at 17:08

## 2021-12-12 RX ADMIN — LOSARTAN POTASSIUM 25 MILLIGRAM(S): 100 TABLET, FILM COATED ORAL at 05:59

## 2021-12-12 RX ADMIN — Medication 40 MILLIEQUIVALENT(S): at 05:52

## 2021-12-12 RX ADMIN — Medication 25 MILLIGRAM(S): at 16:38

## 2021-12-12 RX ADMIN — Medication 30 MILLIGRAM(S): at 23:10

## 2021-12-12 RX ADMIN — Medication 25 MILLIGRAM(S): at 06:00

## 2021-12-12 RX ADMIN — Medication 30 MILLIGRAM(S): at 16:58

## 2021-12-12 NOTE — PROGRESS NOTE ADULT - ASSESSMENT
pt. is  an 89 y/o Female with history of hypertension, CVA no residual deficits , atrial fibrillation not on anticoagulation due to history of ICH in year 2000 as per pt's granddaughter ,  who presents with LE swelling for the last three weeks and also, dry coug on and off, sob with activity, no fever.  sob better at rest, sleeps with head up. As per family no prior h/o chf. LAst hospital admission was more y[facility] a year ago but as per family had ER visits in july and november this year for uti/ pneumonia. denies chest pain, palpitations, dizziness or lightheadedness.  no abd. pain, no n/v/d. reports urine urgency but no pain with urination, pt's u/a is pending at the time of admission, afebrile in the ER. no swallowing difficulty reported but appetite is poor.     # Arrythmia- Wide complex non sustained beats over variable time periods, multiple episodes and recurrent very frequently on Telemetry- Now resolved  STAT EKG- A fib with PVC  D/W Cards if anti arrhythmics other than BB needs to be considered  STAT labs revealed electrolyte imbalance  Etiology deemed probable recent MI given new finding of LV aneurysm as compared to 2019    # Electrolyte imbalance- Hypokalemia, hypomagnesemia- resolved  Replete aggressively    # AE CHFrEF  TTE - EF 35-45 %,. 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 The LV aneurysm is filled with thrombus measuring 2.8 x 1.6 cm. Onel pl effusion  LE edema significantly improved   Furosemide switched To PO 40 mg po daily    c/w Metoprolol 25 mg and Losartan 25 mg po daily   not on anticoagulation due to history of ICH  Started aspirin   Monitor daily weight and I&O    # Leg edema  improving  leg elevation  ACE wraps  DVT ruled out    # LV thrombus  Not on anticoagulation secondary to hx of ICH    # HTN, Hypothyroid,   BP controlled   c/w Metoprolol and Losartan    # Chr A fib with fair rate control in 90s to low 100s range  on Metoprolol   Not on anticoagulation secondary to hx of ICH    # Metabolic Alkalosis  sec to diuresis  Will stop lasix for now  can restart as o/p wehn PO intake improved    # Per family poor appetite, requesting appetite stimulants  start Marinol    # Heparin      GoC- DNR  Plan-  swallow eval was incomplete. currently on thickened liq.  Dispo- needs PT eval.  pt. is  an 89 y/o Female with history of hypertension, CVA no residual deficits , atrial fibrillation not on anticoagulation due to history of ICH in year 2000 as per pt's granddaughter ,  who presents with LE swelling for the last three weeks and also, dry coug on and off, sob with activity, no fever.  sob better at rest, sleeps with head up. As per family no prior h/o chf. LAst hospital admission was more yolanda a year ago but as per family had ER visits in july and november this year for uti/ pneumonia. denies chest pain, palpitations, dizziness or lightheadedness.  no abd. pain, no n/v/d. reports urine urgency but no pain with urination, pt's u/a is pending at the time of admission, afebrile in the ER. no swallowing difficulty reported but appetite is poor.     # Lethargy, somnolence Dehydration  off Lasix now  Gentle hydration started with 60ml/ hr  x 10 hrs  risks and benefits of hydration explained at length to daughter at bedside    # Chr A fib now in RVR  on Metoprolol   no response to Dig loading  some response to CCB IVP x 1 dose  d/c metoprolol, start CCB 30 Q 6 hr Po with IVP PRN for  or more  Not on anticoagulation secondary to hx of ICH    # Arrythmia- Wide complex non sustained beats over variable time periods, multiple episodes and recurrent very frequently on Telemetry- Now resolved  STAT EKG- A fib with PVC  D/W Cards if anti arrhythmics other than BB needs to be considered  STAT labs revealed electrolyte imbalance  Etiology deemed probable recent MI given new finding of LV aneurysm as compared to 2019    # Electrolyte imbalance- Hypokalemia, hypomagnesemia- resolved  Repleted aggressively    # AE CHFrEF  TTE - EF 35-45 %,. 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 The LV aneurysm is filled with thrombus measuring 2.8 x 1.6 cm. Onel pl effusion  LE edema significantly improved   Furosemide switched To PO 40 mg po daily    c/w Metoprolol 25 mg and Losartan 25 mg po daily   not on anticoagulation due to history of ICH  Started aspirin   Monitor daily weight and I&O    # Leg edema  improving  leg elevation  ACE wraps  DVT ruled out    # LV thrombus  Not on anticoagulation secondary to hx of ICH    # HTN, Hypothyroid,   BP controlled   BB changed to CCB and Losartan    # Metabolic Alkalosis- now resolved  sec to diuresis  Will stop Lasix for now  can restart as o/p when PO intake improved    # Per family poor appetite, requesting appetite stimulants  started Marinol    # Heparin      GoC- DNR  Plan-  swallow eval was incomplete. currently on thickened liq. and still acute  Dispo- needs PT eval.

## 2021-12-13 LAB
ANION GAP SERPL CALC-SCNC: 13 MMOL/L — SIGNIFICANT CHANGE UP (ref 5–17)
APPEARANCE: ABNORMAL
BILIRUBIN URINE: NEGATIVE
BLOOD URINE: ABNORMAL
BUN SERPL-MCNC: 13.1 MG/DL — SIGNIFICANT CHANGE UP (ref 8–20)
CALCIUM SERPL-MCNC: 8.4 MG/DL — LOW (ref 8.6–10.2)
CHLORIDE SERPL-SCNC: 101 MMOL/L — SIGNIFICANT CHANGE UP (ref 98–107)
CO2 SERPL-SCNC: 24 MMOL/L — SIGNIFICANT CHANGE UP (ref 22–29)
COLOR: ABNORMAL
CREAT SERPL-MCNC: 0.54 MG/DL — SIGNIFICANT CHANGE UP (ref 0.5–1.3)
DIGOXIN SERPL-MCNC: 1.7 NG/ML — SIGNIFICANT CHANGE UP (ref 0.8–2)
GLUCOSE QUALITATIVE U: NEGATIVE
GLUCOSE SERPL-MCNC: 140 MG/DL — HIGH (ref 70–99)
HCT VFR BLD CALC: 40.5 % — SIGNIFICANT CHANGE UP (ref 34.5–45)
HGB BLD-MCNC: 12.5 G/DL — SIGNIFICANT CHANGE UP (ref 11.5–15.5)
KETONES URINE: NEGATIVE
LEUKOCYTE ESTERASE URINE: ABNORMAL
MCHC RBC-ENTMCNC: 26.4 PG — LOW (ref 27–34)
MCHC RBC-ENTMCNC: 30.9 GM/DL — LOW (ref 32–36)
MCV RBC AUTO: 85.4 FL — SIGNIFICANT CHANGE UP (ref 80–100)
NITRITE URINE: NEGATIVE
NT-PROBNP SERPL-MCNC: 8736 PG/ML
PH URINE: 6
PLATELET # BLD AUTO: 388 K/UL — SIGNIFICANT CHANGE UP (ref 150–400)
POTASSIUM SERPL-MCNC: 4.1 MMOL/L — SIGNIFICANT CHANGE UP (ref 3.5–5.3)
POTASSIUM SERPL-SCNC: 4.1 MMOL/L — SIGNIFICANT CHANGE UP (ref 3.5–5.3)
PROTEIN URINE: ABNORMAL
RBC # BLD: 4.74 M/UL — SIGNIFICANT CHANGE UP (ref 3.8–5.2)
RBC # FLD: 14.5 % — SIGNIFICANT CHANGE UP (ref 10.3–14.5)
SODIUM SERPL-SCNC: 138 MMOL/L — SIGNIFICANT CHANGE UP (ref 135–145)
SPECIFIC GRAVITY URINE: 1.02
UROBILINOGEN URINE: ABNORMAL
WBC # BLD: 13.47 K/UL — HIGH (ref 3.8–10.5)
WBC # FLD AUTO: 13.47 K/UL — HIGH (ref 3.8–10.5)

## 2021-12-13 PROCEDURE — 99232 SBSQ HOSP IP/OBS MODERATE 35: CPT

## 2021-12-13 RX ORDER — SODIUM CHLORIDE 9 MG/ML
1000 INJECTION INTRAMUSCULAR; INTRAVENOUS; SUBCUTANEOUS
Refills: 0 | Status: DISCONTINUED | OUTPATIENT
Start: 2021-12-13 | End: 2021-12-15

## 2021-12-13 RX ORDER — METOPROLOL TARTRATE 50 MG
50 TABLET ORAL DAILY
Refills: 0 | Status: DISCONTINUED | OUTPATIENT
Start: 2021-12-13 | End: 2021-12-15

## 2021-12-13 RX ORDER — METOPROLOL TARTRATE 50 MG
50 TABLET ORAL DAILY
Refills: 0 | Status: DISCONTINUED | OUTPATIENT
Start: 2021-12-13 | End: 2021-12-13

## 2021-12-13 RX ADMIN — Medication 1000 UNIT(S): at 08:46

## 2021-12-13 RX ADMIN — Medication 81 MILLIGRAM(S): at 08:46

## 2021-12-13 RX ADMIN — SODIUM CHLORIDE 60 MILLILITER(S): 9 INJECTION INTRAMUSCULAR; INTRAVENOUS; SUBCUTANEOUS at 08:45

## 2021-12-13 RX ADMIN — Medication 30 MILLIGRAM(S): at 06:21

## 2021-12-13 RX ADMIN — HEPARIN SODIUM 5000 UNIT(S): 5000 INJECTION INTRAVENOUS; SUBCUTANEOUS at 06:21

## 2021-12-13 RX ADMIN — Medication 2.5 MILLIGRAM(S): at 08:46

## 2021-12-13 RX ADMIN — PANTOPRAZOLE SODIUM 40 MILLIGRAM(S): 20 TABLET, DELAYED RELEASE ORAL at 06:21

## 2021-12-13 RX ADMIN — LOSARTAN POTASSIUM 25 MILLIGRAM(S): 100 TABLET, FILM COATED ORAL at 06:21

## 2021-12-13 RX ADMIN — HEPARIN SODIUM 5000 UNIT(S): 5000 INJECTION INTRAVENOUS; SUBCUTANEOUS at 16:58

## 2021-12-13 RX ADMIN — Medication 1 TABLET(S): at 08:46

## 2021-12-13 RX ADMIN — SPIRONOLACTONE 25 MILLIGRAM(S): 25 TABLET, FILM COATED ORAL at 06:22

## 2021-12-13 RX ADMIN — Medication 50 MICROGRAM(S): at 06:21

## 2021-12-13 NOTE — PROGRESS NOTE ADULT - ASSESSMENT
pt. is  an 89 y/o Female with history of hypertension, CVA no residual deficits , atrial fibrillation not on anticoagulation due to history of ICH in year 2000 as per pt's granddaughter ,  who presents with LE swelling for the last three weeks and also, dry coug on and off, sob with activity, no fever.  sob better at rest, sleeps with head up. As per family no prior h/o chf. LAst hospital admission was more yolanda a year ago but as per family had ER visits in july and november this year for uti/ pneumonia. denies chest pain, palpitations, dizziness or lightheadedness.  no abd. pain, no n/v/d. reports urine urgency but no pain with urination, pt's u/a is pending at the time of admission, afebrile in the ER. no swallowing difficulty reported but appetite is poor.     # Lethargy, somnolence Dehydration- now resolved  off Lasix now  Gentle hydration started with 60ml/ hr  x 10 hrs. stop after today  risks and benefits of hydration explained at length to daughter at bedside    # Chr A fib now in RVR  on Metoprolol   no response to Dig loading  some response to CCB IVP x 1 dose  PO CCB with good response. HR now in 90s range.  Given LV dysfxn CCB now stopped and instead resumed lopressor at 50mg. Up titrate same for rate control as tolerated  Not on anticoagulation secondary to hx of ICH    # Arrythmia- Wide complex non sustained beats over variable time periods, multiple episodes and recurrent very frequently on Telemetry- Now resolved  STAT EKG- A fib with PVC  D/W Cards if anti arrhythmics other than BB needs to be considered  STAT labs revealed electrolyte imbalance  Etiology deemed probable recent MI given new finding of LV aneurysm as compared to 2019    # Electrolyte imbalance- Hypokalemia, hypomagnesemia- resolved  Repleted aggressively    # AE CHFrEF- resolved  TTE - EF 35-45 %,. 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 The LV aneurysm is filled with thrombus measuring 2.8 x 1.6 cm. Onel pl effusion  LE edema significantly improved   off lasix now  c/w Metoprolol 25 mg, Spironolactone and Losartan 25 mg po daily   not on anticoagulation due to history of ICH  Started aspirin   Monitor daily weight and I&O  will reassess starting lasix in AM    # Leg edema  improving  leg elevation  ACE wraps  DVT ruled out    # LV thrombus  Not on anticoagulation secondary to hx of ICH    # HTN, Hypothyroid,   BP controlled   BB changed to CCB and Losartan    # Metabolic Alkalosis- now resolved  sec to diuresis  Will stop Lasix for now. reassess resuming in AM  can restart as o/p when PO intake improved    # Per family poor appetite, requesting appetite stimulants  started Marinol    # Heparin      GoC- DNR  Plan-  MBS aborted. can be done o/p  Dispo- Home with assist  LOS- 1 day

## 2021-12-14 ENCOUNTER — TRANSCRIPTION ENCOUNTER (OUTPATIENT)
Age: 86
End: 2021-12-14

## 2021-12-14 LAB
ANION GAP SERPL CALC-SCNC: 11 MMOL/L — SIGNIFICANT CHANGE UP (ref 5–17)
BUN SERPL-MCNC: 14.2 MG/DL — SIGNIFICANT CHANGE UP (ref 8–20)
CALCIUM SERPL-MCNC: 8.6 MG/DL — SIGNIFICANT CHANGE UP (ref 8.6–10.2)
CHLORIDE SERPL-SCNC: 105 MMOL/L — SIGNIFICANT CHANGE UP (ref 98–107)
CO2 SERPL-SCNC: 23 MMOL/L — SIGNIFICANT CHANGE UP (ref 22–29)
CREAT SERPL-MCNC: 0.56 MG/DL — SIGNIFICANT CHANGE UP (ref 0.5–1.3)
GLUCOSE SERPL-MCNC: 97 MG/DL — SIGNIFICANT CHANGE UP (ref 70–99)
HCT VFR BLD CALC: 38.8 % — SIGNIFICANT CHANGE UP (ref 34.5–45)
HGB BLD-MCNC: 12 G/DL — SIGNIFICANT CHANGE UP (ref 11.5–15.5)
MCHC RBC-ENTMCNC: 26.4 PG — LOW (ref 27–34)
MCHC RBC-ENTMCNC: 30.9 GM/DL — LOW (ref 32–36)
MCV RBC AUTO: 85.5 FL — SIGNIFICANT CHANGE UP (ref 80–100)
PLATELET # BLD AUTO: 418 K/UL — HIGH (ref 150–400)
POTASSIUM SERPL-MCNC: 4 MMOL/L — SIGNIFICANT CHANGE UP (ref 3.5–5.3)
POTASSIUM SERPL-SCNC: 4 MMOL/L — SIGNIFICANT CHANGE UP (ref 3.5–5.3)
RBC # BLD: 4.54 M/UL — SIGNIFICANT CHANGE UP (ref 3.8–5.2)
RBC # FLD: 14.6 % — HIGH (ref 10.3–14.5)
SARS-COV-2 RNA SPEC QL NAA+PROBE: SIGNIFICANT CHANGE UP
SODIUM SERPL-SCNC: 139 MMOL/L — SIGNIFICANT CHANGE UP (ref 135–145)
WBC # BLD: 13 K/UL — HIGH (ref 3.8–10.5)
WBC # FLD AUTO: 13 K/UL — HIGH (ref 3.8–10.5)

## 2021-12-14 PROCEDURE — 99232 SBSQ HOSP IP/OBS MODERATE 35: CPT

## 2021-12-14 PROCEDURE — 99497 ADVNCD CARE PLAN 30 MIN: CPT

## 2021-12-14 RX ORDER — LOSARTAN POTASSIUM 100 MG/1
1 TABLET, FILM COATED ORAL
Qty: 0 | Refills: 0 | DISCHARGE
Start: 2021-12-14

## 2021-12-14 RX ORDER — ERGOCALCIFEROL 1.25 MG/1
0 CAPSULE ORAL
Qty: 0 | Refills: 0 | DISCHARGE

## 2021-12-14 RX ORDER — FUROSEMIDE 40 MG
20 TABLET ORAL DAILY
Refills: 0 | Status: DISCONTINUED | OUTPATIENT
Start: 2021-12-14 | End: 2021-12-14

## 2021-12-14 RX ORDER — DRONABINOL 2.5 MG
1 CAPSULE ORAL
Qty: 0 | Refills: 0 | DISCHARGE
Start: 2021-12-14

## 2021-12-14 RX ORDER — ASPIRIN/CALCIUM CARB/MAGNESIUM 324 MG
1 TABLET ORAL
Qty: 0 | Refills: 0 | DISCHARGE
Start: 2021-12-14

## 2021-12-14 RX ORDER — FUROSEMIDE 40 MG
1 TABLET ORAL
Qty: 0 | Refills: 0 | DISCHARGE
Start: 2021-12-14

## 2021-12-14 RX ORDER — CHOLECALCIFEROL (VITAMIN D3) 125 MCG
1000 CAPSULE ORAL
Qty: 0 | Refills: 0 | DISCHARGE
Start: 2021-12-14

## 2021-12-14 RX ORDER — METOPROLOL TARTRATE 50 MG
1 TABLET ORAL
Qty: 0 | Refills: 0 | DISCHARGE
Start: 2021-12-14

## 2021-12-14 RX ORDER — FUROSEMIDE 40 MG
40 TABLET ORAL DAILY
Refills: 0 | Status: DISCONTINUED | OUTPATIENT
Start: 2021-12-14 | End: 2021-12-15

## 2021-12-14 RX ORDER — METOPROLOL TARTRATE 50 MG
1 TABLET ORAL
Qty: 0 | Refills: 0 | DISCHARGE

## 2021-12-14 RX ORDER — SPIRONOLACTONE 25 MG/1
1 TABLET, FILM COATED ORAL
Qty: 0 | Refills: 0 | DISCHARGE
Start: 2021-12-14

## 2021-12-14 RX ADMIN — Medication 50 MILLIGRAM(S): at 05:40

## 2021-12-14 RX ADMIN — Medication 40 MILLIGRAM(S): at 13:09

## 2021-12-14 RX ADMIN — Medication 1000 UNIT(S): at 13:10

## 2021-12-14 RX ADMIN — Medication 1 TABLET(S): at 13:10

## 2021-12-14 RX ADMIN — Medication 2.5 MILLIGRAM(S): at 13:10

## 2021-12-14 RX ADMIN — QUETIAPINE FUMARATE 12.5 MILLIGRAM(S): 200 TABLET, FILM COATED ORAL at 13:09

## 2021-12-14 RX ADMIN — LOSARTAN POTASSIUM 25 MILLIGRAM(S): 100 TABLET, FILM COATED ORAL at 05:41

## 2021-12-14 RX ADMIN — PANTOPRAZOLE SODIUM 40 MILLIGRAM(S): 20 TABLET, DELAYED RELEASE ORAL at 05:41

## 2021-12-14 RX ADMIN — SPIRONOLACTONE 25 MILLIGRAM(S): 25 TABLET, FILM COATED ORAL at 05:40

## 2021-12-14 RX ADMIN — Medication 50 MICROGRAM(S): at 05:41

## 2021-12-14 RX ADMIN — HEPARIN SODIUM 5000 UNIT(S): 5000 INJECTION INTRAVENOUS; SUBCUTANEOUS at 05:40

## 2021-12-14 RX ADMIN — FAMOTIDINE 20 MILLIGRAM(S): 10 INJECTION INTRAVENOUS at 23:20

## 2021-12-14 RX ADMIN — HEPARIN SODIUM 5000 UNIT(S): 5000 INJECTION INTRAVENOUS; SUBCUTANEOUS at 18:10

## 2021-12-14 RX ADMIN — Medication 81 MILLIGRAM(S): at 13:10

## 2021-12-14 NOTE — PROGRESS NOTE ADULT - PROBLEM SELECTOR PROBLEM 3
HTN (hypertension)
Hypothyroidism

## 2021-12-14 NOTE — PROGRESS NOTE ADULT - NUTRITIONAL ASSESSMENT
This patient has been assessed with a concern for Malnutrition and has been determined to have a diagnosis/diagnoses of Severe protein-calorie malnutrition.      
This patient has been assessed with a concern for Malnutrition and has been determined to have a diagnosis/diagnoses of Severe protein-calorie malnutrition.    This patient is being managed with:   Diet Soft and Bite Sized-  DASH/TLC {Sodium & Cholesterol Restricted} (DASH)  Entered: Dec  9 2021 12:42AM    
This patient has been assessed with a concern for Malnutrition and has been determined to have a diagnosis/ diagnoses of Severe protein-calorie malnutrition.    This patient is being managed with:   Diet Soft and Bite Sized-  DASH/ TLC {Sodium & Cholesterol Restricted} (DASH)  Supplement Feeding Modality:  Oral  Ensure Clear Cans or Servings Per Day:  1  Frequency:  Two Times a day  Entered: Dec 12 2021 12:43PM
This patient has been assessed with a concern for Malnutrition and has been determined to have a diagnosis/diagnoses of Severe protein-calorie malnutrition.      
This patient has been assessed with a concern for Malnutrition and has been determined to have a diagnosis/diagnoses of Severe protein-calorie malnutrition.    This patient is being managed with:   Diet Soft and Bite Sized-  DASH/TLC {Sodium & Cholesterol Restricted} (DASH)  Entered: Dec  9 2021 12:42AM    
This patient has been assessed with a concern for Malnutrition and has been determined to have a diagnosis/diagnoses of Severe protein-calorie malnutrition.    This patient is being managed with:   Diet Soft and Bite Sized-  DASH/TLC {Sodium & Cholesterol Restricted} (DASH)  Entered: Dec  9 2021 12:42AM    
This patient has been assessed with a concern for Malnutrition and has been determined to have a diagnosis/diagnoses of Severe protein-calorie malnutrition.    This patient is being managed with:   Diet Soft and Bite Sized-  DASH/TLC {Sodium & Cholesterol Restricted} (DASH)  Supplement Feeding Modality:  Oral  Ensure Clear Cans or Servings Per Day:  1       Frequency:  Two Times a day  Entered: Dec 12 2021 12:43PM

## 2021-12-14 NOTE — DISCHARGE NOTE PROVIDER - NSDCPNSUBOBJ_GEN_ALL_CORE
HEALTH ISSUES - PROBLEM Dx:    CHF    INTERVAL HPI/OVERNIGHT EVENTS:    more awake and interactive today  has an appetite and had full BF per family  offers no complaints  family at bedside    overnight issues noted    REVIEW OF SYSTEMS:    as above    ICU Vital Signs Last 24 Hrs  T(C): 36.7 (15 Dec 2021 05:54), Max: 37.4 (15 Dec 2021 04:59)  T(F): 98 (15 Dec 2021 05:54), Max: 99.4 (15 Dec 2021 04:59)  HR: 105 (15 Dec 2021 05:54) (61 - 131)  BP: 157/95 (15 Dec 2021 04:59) (110/92 - 157/95)  BP(mean): --  ABP: --  ABP(mean): --  RR: 20 (15 Dec 2021 04:59) (18 - 20)  SpO2: 95% (15 Dec 2021 04:59) (94% - 96%)      PHYSICAL EXAM-  GENERAL: frail built, lethargic, awake and integrative today  HEAD:  Atraumatic, Normocephalic  EYES: EOMI, conjunctiva and sclera clear  ENT:  Moist mucous membranes,  No lesions  NECK: Supple, No JVD, Normal thyroid  NERVOUS SYSTEM:  Alert & Oriented X 1-2, with some confusion and irrelevant talks in between, Motor Strength 5/5 B/L upper and lower extremities  CHEST/LUNG: left basal rales+; No rhonchi, wheezing, or rubs  HEART: Irregular rate and rhythm; No murmurs, rubs, or gallops  ABDOMEN: Soft, Nontender, Nondistended; Bowel sounds present  EXTREMITIES:  2+ Peripheral Pulses, No clubbing, cyanosis, or edema  SKIN: No rashes or lesions    Assessment and Plan:   	  pt. is  an 89 y/o Female with history of hypertension, CVA no residual deficits , atrial fibrillation not on anticoagulation due to history of ICH in year 2000 as per pt's granddaughter ,  who presents with LE swelling for the last three weeks and also, dry coug on and off, sob with activity, no fever.  sob better at rest, sleeps with head up. As per family no prior h/o chf. LAst hospital admission was more [facility] a year ago but as per family had ER visits in july and november this year for uti/ pneumonia. denies chest pain, palpitations, dizziness or lightheadedness.  no abd. pain, no n/v/d. reports urine urgency but no pain with urination, pt's u/a is pending at the time of admission, afebrile in the ER. no swallowing difficulty reported but appetite is poor.       # Chr A fib s/p RVR- now rate controlled  on Metoprolol   no response to Dig loading  Given LV dysfxn CCB now stopped and instead resumed lopressor at 50mg. increased to 100  Not on anticoagulation secondary to hx of ICH    # Arrythmia- Wide complex non sustained beats over variable time periods, multiple episodes and recurrent very frequently on Telemetry- Now resolved  STAT EKG- A fib with PVC  D/W Cards if anti arrhythmics other than BB needs to be considered  STAT labs revealed electrolyte imbalance  Etiology deemed probable recent MI given new finding of LV aneurysm as compared to 2019    # Electrolyte imbalance- Hypokalemia, hypomagnesemia- resolved  Repleted aggressively    # AE CHFrEF- resolved  TTE - EF 35-45 %,. 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 The LV aneurysm is filled with thrombus measuring 2.8 x 1.6 cm. Onel pl effusion  LE edema significantly improved   off lasix now  c/w Metoprolol  Spironolactone and Losartan 25 mg po daily   not on anticoagulation due to history of ICH  Started aspirin   Monitor daily weight and I&O  Start lasix 40 qd x 2 days and then 20mg daily at home  fluid restriction to be maintained for next 5-7 days at least    # Leg edema  improving  leg elevation  ACE wraps  DVT ruled out    # LV thrombus  Not on anticoagulation secondary to hx of ICH    # HTN, Hypothyroid,   BP controlled   BB changed to CCB and Losartan    # Metabolic Alkalosis- now resolved  sec to diuresis  Will stop Lasix for now. reassess resuming in AM  can restart as o/p when PO intake improved    # Per family poor appetite, requesting appetite stimulants  started Marinol    # Lethargy, somnolence Dehydration- now resolved  s/p hydration    # Heparin      GoC- DNR  Plan- per PT- [facility]. Family wants [facility].  Dispo- to [facility] when set up  LOS- Medically ready for discharge

## 2021-12-14 NOTE — PROGRESS NOTE ADULT - PROBLEM SELECTOR PROBLEM 5
Chronic atrial fibrillation
Hypokalemia
Chronic atrial fibrillation

## 2021-12-14 NOTE — PHYSICAL THERAPY INITIAL EVALUATION ADULT - PERTINENT HX OF CURRENT PROBLEM, REHAB EVAL
present to ED with presents with LE swelling, SOB with activities, dry cough on and off, hx  Chr A fib s/p RVR

## 2021-12-14 NOTE — PHYSICAL THERAPY INITIAL EVALUATION ADULT - STANDING BALANCE: STATIC
unable to maintain COM over ISHMAEL, posterior lean with excessive weight shift to the left in frontal plane/poor balance

## 2021-12-14 NOTE — DISCHARGE NOTE PROVIDER - DETAILS OF MALNUTRITION DIAGNOSIS/DIAGNOSES
This patient has been assessed with a concern for Malnutrition and was treated during this hospitalization for the following Nutrition diagnosis/diagnoses:     -  12/10/2021: Severe protein-calorie malnutrition

## 2021-12-14 NOTE — PROGRESS NOTE ADULT - PROBLEM SELECTOR PROBLEM 1
Acute systolic congestive heart failure
Leg edema
Acute systolic congestive heart failure

## 2021-12-14 NOTE — DISCHARGE NOTE PROVIDER - NSDCCPCAREPLAN_GEN_ALL_CORE_FT
PRINCIPAL DISCHARGE DIAGNOSIS  Diagnosis: Acute CHF  Assessment and Plan of Treatment:       SECONDARY DISCHARGE DIAGNOSES  Diagnosis: Atrial fibrillation with rapid ventricular response  Assessment and Plan of Treatment:     Diagnosis: Electrolyte imbalance  Assessment and Plan of Treatment:

## 2021-12-14 NOTE — PROGRESS NOTE ADULT - PROBLEM SELECTOR PROBLEM 4
Chronic atrial fibrillation
Hypothyroidism

## 2021-12-14 NOTE — DISCHARGE NOTE PROVIDER - HOSPITAL COURSE
88 y/o Female with history of hypertension, CVA no residual deficits , atrial fibrillation not on anticoagulation due to history of ICH in year 2000 as per pt's granddaughter ,  who presents with LE swelling for the last three weeks sec to CHF. course complicated by lethargy/ somnolence post diuresis and needing hydration, arrythmias, A fib RVR  TTE - EF 35-45 %,. 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 The LV aneurysm is filled with thrombus measuring 2.8 x 1.6 cm. Onel pl effusion.  All resolved now.  Medically stable and agreeable with discharge and follow up plan. Patient was advised to return to ED if any symptoms occur or worsen.  TIME 43 mins   89 y/o Female with history of hypertension, CVA no residual deficits , atrial fibrillation not on anticoagulation due to history of ICH in year 2000 as per pt's granddaughter ,  who presents with LE swelling for the last three weeks sec to CHF. course complicated by lethargy/ somnolence post diuresis and needing hydration, arrythmias, A fib RVR  TTE - EF 35-45 %,. 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 The LV aneurysm is filled with thrombus measuring 2.8 x 1.6 cm. Onel pl effusion.  All resolved now. Medically stable and agreeable with discharge and follow up plan. Patient was advised to return to ED if any symptoms occur or worsen.  TIME 43 mins

## 2021-12-14 NOTE — PHYSICAL THERAPY INITIAL EVALUATION ADULT - SITTING BALANCE: STATIC
in closed kinetic chain, right side inattention, inability to find and maintain midline/fair balance

## 2021-12-14 NOTE — DISCHARGE NOTE PROVIDER - NSDCMRMEDTOKEN_GEN_ALL_CORE_FT
aspirin 81 mg oral tablet, chewable: 1 tab(s) orally once a day  cholecalciferol oral tablet: 1000 unit(s) orally once a day  dronabinol 2.5 mg oral capsule: 1 cap(s) orally once a day  famotidine 20 mg oral tablet: 1 tab(s) orally once a day (at bedtime)  furosemide 20 mg oral tablet: 1 tab(s) orally once a day  losartan 25 mg oral tablet: 1 tab(s) orally once a day  metoprolol succinate 50 mg oral tablet, extended release: 1 tab(s) orally once a day  Multiple Vitamins oral tablet: 1 tab(s) orally once a day  omeprazole 40 mg oral delayed release capsule: 1 cap(s) orally once a day  spironolactone 25 mg oral tablet: 1 tab(s) orally once a day  Synthroid 50 mcg (0.05 mg) oral tablet: 1 tab(s) orally once a day   aspirin 81 mg oral tablet, chewable: 1 tab(s) orally once a day  cholecalciferol oral tablet: 1000 unit(s) orally once a day  dronabinol 2.5 mg oral capsule: 1 cap(s) orally once a day  famotidine 20 mg oral tablet: 1 tab(s) orally once a day (at bedtime)  furosemide 20 mg oral tablet: 1 tab(s) orally once a day  losartan 25 mg oral tablet: 1 tab(s) orally once a day  metoprolol succinate 100 mg oral tablet, extended release: 1 tab(s) orally once a day  Multiple Vitamins oral tablet: 1 tab(s) orally once a day  omeprazole 40 mg oral delayed release capsule: 1 cap(s) orally once a day  spironolactone 25 mg oral tablet: 1 tab(s) orally once a day  Synthroid 50 mcg (0.05 mg) oral tablet: 1 tab(s) orally once a day   aspirin 81 mg oral tablet, chewable: 1 tab(s) orally once a day  cholecalciferol oral tablet: 1000 unit(s) orally once a day  digoxin 125 mcg (0.125 mg) oral tablet: 1 tab(s) orally once a day  famotidine 20 mg oral tablet: 1 tab(s) orally once a day (at bedtime)  furosemide 20 mg oral tablet: 1 tab(s) orally once a day  losartan 25 mg oral tablet: 1 tab(s) orally once a day  metoprolol succinate 100 mg oral tablet, extended release: 1 tab(s) orally once a day  Multiple Vitamins oral tablet: 1 tab(s) orally once a day  omeprazole 40 mg oral delayed release capsule: 1 cap(s) orally once a day  spironolactone 25 mg oral tablet: 1 tab(s) orally once a day  Synthroid 50 mcg (0.05 mg) oral tablet: 1 tab(s) orally once a day

## 2021-12-14 NOTE — DISCHARGE NOTE PROVIDER - CARE PROVIDER_API CALL
Agnes Watkins)  Internal Medicine  [address] [place] [id number]  Phone: [phone]  Fax: [phone]  Follow Up Time:     Hilda Galindo)  Family Medicine  [address] [facility], [address]  [place] [address]  Phone: [phone]  Fax: [phone]  Follow Up Time:

## 2021-12-14 NOTE — PHYSICAL THERAPY INITIAL EVALUATION ADULT - IMPAIRMENTS FOUND, PT EVAL
aerobic capacity/endurance/cognitive impairment/gait, locomotion, and balance/muscle strength/neuromotor development and sensory integration

## 2021-12-14 NOTE — PROGRESS NOTE ADULT - ASSESSMENT
Assessment  HFrEF presently well compensated w/o volume overlaod  Chronic AF with CVR not AC candidate  remote ICB  AWMI ? age with LV anuerysm and LV thrombus ? age  dementia      Rec  cont current meds incl asa  not Ac candidate  add low dose lasix 20 mg day  PT eval  if pt requires subacute rehab granddaughter requesting [facility] ( she is employed there)

## 2021-12-14 NOTE — DISCHARGE NOTE PROVIDER - CARE PROVIDERS DIRECT ADDRESSES
,DirectAddress_Unknown,[email or web address]

## 2021-12-14 NOTE — PROGRESS NOTE ADULT - ASSESSMENT
pt. is  an 89 y/o Female with history of hypertension, CVA no residual deficits , atrial fibrillation not on anticoagulation due to history of ICH in year 2000 as per pt's granddaughter ,  who presents with LE swelling for the last three weeks and also, dry coug on and off, sob with activity, no fever.  sob better at rest, sleeps with head up. As per family no prior h/o chf. LAst hospital admission was more [facility] a year ago but as per family had ER visits in july and november this year for uti/ pneumonia. denies chest pain, palpitations, dizziness or lightheadedness.  no abd. pain, no n/v/d. reports urine urgency but no pain with urination, pt's u/a is pending at the time of admission, afebrile in the ER. no swallowing difficulty reported but appetite is poor.       # Chr A fib s/p RVR- now rate controlled  on Metoprolol   no response to Dig loading  Given LV dysfxn CCB now stopped and instead resumed lopressor at 50mg. Up titrate same for rate control as tolerated  Not on anticoagulation secondary to hx of ICH    # Arrythmia- Wide complex non sustained beats over variable time periods, multiple episodes and recurrent very frequently on Telemetry- Now resolved  STAT EKG- A fib with PVC  D/W Cards if anti arrhythmics other than BB needs to be considered  STAT labs revealed electrolyte imbalance  Etiology deemed probable recent MI given new finding of LV aneurysm as compared to 2019    # Electrolyte imbalance- Hypokalemia, hypomagnesemia- resolved  Repleted aggressively    # AE CHFrEF- resolved  TTE - EF 35-45 %,. 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 The LV aneurysm is filled with thrombus measuring 2.8 x 1.6 cm. Onel pl effusion  LE edema significantly improved   off lasix now  c/w Metoprolol 25 mg, Spironolactone and Losartan 25 mg po daily   not on anticoagulation due to history of ICH  Started aspirin   Monitor daily weight and I&O  Start lasix 40 qd x 2 days and then 20mg daily at home  fluid restriction to be maintained for next 5-7 days at least    # Leg edema  improving  leg elevation  ACE wraps  DVT ruled out    # LV thrombus  Not on anticoagulation secondary to hx of ICH    # HTN, Hypothyroid,   BP controlled   BB changed to CCB and Losartan    # Metabolic Alkalosis- now resolved  sec to diuresis  Will stop Lasix for now. reassess resuming in AM  can restart as o/p when PO intake improved    # Per family poor appetite, requesting appetite stimulants  started Marinol    # Lethargy, somnolence Dehydration- now resolved  s/p hydration    # Heparin      GoC- DNR  Plan- per PT- [facility]. Family wants [facility].  Dispo- to [facility] when set up  LOS- Medically ready for discharge

## 2021-12-15 VITALS
RESPIRATION RATE: 20 BRPM | DIASTOLIC BLOOD PRESSURE: 69 MMHG | SYSTOLIC BLOOD PRESSURE: 109 MMHG | TEMPERATURE: 98 F | HEART RATE: 95 BPM | OXYGEN SATURATION: 98 %

## 2021-12-15 PROBLEM — I10 ESSENTIAL (PRIMARY) HYPERTENSION: Chronic | Status: ACTIVE | Noted: 2021-12-09

## 2021-12-15 LAB
ANION GAP SERPL CALC-SCNC: 15 MMOL/L — SIGNIFICANT CHANGE UP (ref 5–17)
CHLORIDE SERPL-SCNC: 102 MMOL/L — SIGNIFICANT CHANGE UP (ref 98–107)
CO2 SERPL-SCNC: 24 MMOL/L — SIGNIFICANT CHANGE UP (ref 22–29)
NT-PROBNP SERPL-SCNC: HIGH PG/ML (ref 0–300)
POTASSIUM SERPL-MCNC: 3.5 MMOL/L — SIGNIFICANT CHANGE UP (ref 3.5–5.3)
POTASSIUM SERPL-SCNC: 3.5 MMOL/L — SIGNIFICANT CHANGE UP (ref 3.5–5.3)
SODIUM SERPL-SCNC: 141 MMOL/L — SIGNIFICANT CHANGE UP (ref 135–145)

## 2021-12-15 RX ORDER — METOPROLOL TARTRATE 50 MG
1 TABLET ORAL
Qty: 0 | Refills: 0 | DISCHARGE
Start: 2021-12-15

## 2021-12-15 RX ORDER — DIGOXIN 250 MCG
125 TABLET ORAL DAILY
Refills: 0 | Status: DISCONTINUED | OUTPATIENT
Start: 2021-12-15 | End: 2021-12-15

## 2021-12-15 RX ORDER — DIGOXIN 250 MCG
1 TABLET ORAL
Qty: 0 | Refills: 0 | DISCHARGE
Start: 2021-12-15

## 2021-12-15 RX ORDER — FUROSEMIDE 40 MG
20 TABLET ORAL DAILY
Refills: 0 | Status: DISCONTINUED | OUTPATIENT
Start: 2021-12-16 | End: 2021-12-15

## 2021-12-15 RX ORDER — METOPROLOL TARTRATE 50 MG
50 TABLET ORAL ONCE
Refills: 0 | Status: COMPLETED | OUTPATIENT
Start: 2021-12-15 | End: 2021-12-15

## 2021-12-15 RX ORDER — METOPROLOL TARTRATE 50 MG
100 TABLET ORAL DAILY
Refills: 0 | Status: DISCONTINUED | OUTPATIENT
Start: 2021-12-16 | End: 2021-12-15

## 2021-12-15 RX ADMIN — PANTOPRAZOLE SODIUM 40 MILLIGRAM(S): 20 TABLET, DELAYED RELEASE ORAL at 05:43

## 2021-12-15 RX ADMIN — Medication 40 MILLIGRAM(S): at 05:45

## 2021-12-15 RX ADMIN — HEPARIN SODIUM 5000 UNIT(S): 5000 INJECTION INTRAVENOUS; SUBCUTANEOUS at 05:45

## 2021-12-15 RX ADMIN — Medication 1000 UNIT(S): at 11:29

## 2021-12-15 RX ADMIN — SPIRONOLACTONE 25 MILLIGRAM(S): 25 TABLET, FILM COATED ORAL at 05:42

## 2021-12-15 RX ADMIN — Medication 1 TABLET(S): at 11:29

## 2021-12-15 RX ADMIN — Medication 50 MICROGRAM(S): at 05:43

## 2021-12-15 RX ADMIN — Medication 50 MILLIGRAM(S): at 05:42

## 2021-12-15 RX ADMIN — Medication 81 MILLIGRAM(S): at 11:29

## 2021-12-15 RX ADMIN — QUETIAPINE FUMARATE 12.5 MILLIGRAM(S): 200 TABLET, FILM COATED ORAL at 11:30

## 2021-12-15 RX ADMIN — LOSARTAN POTASSIUM 25 MILLIGRAM(S): 100 TABLET, FILM COATED ORAL at 05:45

## 2021-12-15 NOTE — PROGRESS NOTE ADULT - PROVIDER SPECIALTY LIST ADULT
Cardiology
Hospitalist
Internal Medicine
Hospitalist

## 2021-12-15 NOTE — PROGRESS NOTE ADULT - SUBJECTIVE AND OBJECTIVE BOX
HEALTH ISSUES - PROBLEM Dx:    CHF    INTERVAL HPI/OVERNIGHT EVENTS:    frail built, lethargic  confused   offers no complaints  family at bedside    REVIEW OF SYSTEMS:    as above    Vital Signs Last 24 Hrs  T(C): 36.5 (11 Dec 2021 09:15), Max: 37.1 (10 Dec 2021 16:24)  T(F): 97.7 (11 Dec 2021 09:15), Max: 98.8 (10 Dec 2021 16:24)  HR: 113 (11 Dec 2021 09:15) (104 - 132)  BP: 121/75 (11 Dec 2021 09:15) (119/76 - 153/81)  BP(mean): --  RR: 18 (11 Dec 2021 09:15) (18 - 19)  SpO2: 96% (11 Dec 2021 09:15) (94% - 98%)    PHYSICAL EXAM-  GENERAL: frail bulit, lethrgic, wnats to sleep  HEAD:  Atraumatic, Normocephalic  EYES: EOMI, conjunctiva and sclera clear  ENT:  Moist mucous membranes,  No lesions  NECK: Supple, No JVD, Normal thyroid  NERVOUS SYSTEM:  Alert & Oriented X 1-2, with some confusion and irrelavant talks in between, Motor Strength 5/5 B/L upper and lower extremities  CHEST/LUNG: Clear to percussion bilaterally; No rales, rhonchi, wheezing, or rubs  HEART: IRRegular rate and rhythm; No murmurs, rubs, or gallops  ABDOMEN: Soft, Nontender, Nondistended; Bowel sounds present  EXTREMITIES:  2+ Peripheral Pulses, No clubbing, cyanosis, or edema  SKIN: No rashes or lesions    MEDICATIONS  (STANDING):  aspirin  chewable 81 milliGRAM(s) Oral daily  cholecalciferol 1000 Unit(s) Oral daily  famotidine    Tablet 20 milliGRAM(s) Oral at bedtime  furosemide    Tablet 40 milliGRAM(s) Oral daily  heparin   Injectable 5000 Unit(s) SubCutaneous every 12 hours  levothyroxine 50 MICROGram(s) Oral daily  losartan 25 milliGRAM(s) Oral daily  magnesium sulfate  IVPB 2 Gram(s) IV Intermittent once  metoprolol succinate ER 25 milliGRAM(s) Oral daily  multivitamin 1 Tablet(s) Oral daily  pantoprazole    Tablet 40 milliGRAM(s) Oral before breakfast  potassium chloride   Powder 40 milliEquivalent(s) Oral two times a day  potassium chloride  10 mEq/100 mL IVPB 10 milliEquivalent(s) IV Intermittent every 1 hour  QUEtiapine 12.5 milliGRAM(s) Oral daily  spironolactone 25 milliGRAM(s) Oral daily    MEDICATIONS  (PRN):  albuterol/ipratropium for Nebulization 3 milliLiter(s) Nebulizer every 6 hours PRN Shortness of Breath and/or Wheezing  guaiFENesin Oral Liquid (Sugar-Free) 100 milliGRAM(s) Oral every 6 hours PRN Cough      LABS:                        11.6   10.78 )-----------( 430      ( 11 Dec 2021 11:07 )             37.2     12-11    140  |  96<L>  |  12.0  ----------------------------<  136<H>  2.7<LL>   |  31.0<H>  |  0.67    Ca    8.4<L>      11 Dec 2021 11:07  Phos  3.1     12-11  Mg     1.6     12-11    
HEALTH ISSUES - PROBLEM Dx:    CHF    INTERVAL HPI/OVERNIGHT EVENTS:    frail built, weak  confused   offers no complaints  family at bedside    REVIEW OF SYSTEMS:    as above    Vital Signs Last 24 Hrs  T(C): 36.4 (12 Dec 2021 07:53), Max: 37 (12 Dec 2021 03:15)  T(F): 97.5 (12 Dec 2021 07:53), Max: 98.6 (12 Dec 2021 03:15)  HR: 112 (12 Dec 2021 07:53) (111 - 143)  BP: 151/82 (12 Dec 2021 07:53) (134/81 - 151/82)  BP(mean): --  RR: 18 (12 Dec 2021 07:53) (17 - 18)  SpO2: 97% (12 Dec 2021 07:53) (96% - 97%)    PHYSICAL EXAM-  GENERAL: frail bulit, lethrgic, weak  HEAD:  Atraumatic, Normocephalic  EYES: EOMI, conjunctiva and sclera clear  ENT:  Moist mucous membranes,  No lesions  NECK: Supple, No JVD, Normal thyroid  NERVOUS SYSTEM:  Alert & Oriented X 1-2, with some confusion and irrelavant talks in between, Motor Strength 5/5 B/L upper and lower extremities  CHEST/LUNG: CTA bilaterally; No rales, rhonchi, wheezing, or rubs  HEART: IRRegular rate and rhythm; No murmurs, rubs, or gallops  ABDOMEN: Soft, Nontender, Nondistended; Bowel sounds present  EXTREMITIES:  2+ Peripheral Pulses, No clubbing, cyanosis, or edema  SKIN: No rashes or lesions    MEDICATIONS  (STANDING):    MEDICATIONS  (PRN):      LABS:                        11.6   10.78 )-----------( 430      ( 11 Dec 2021 11:07 )             37.2     12-11    138  |  97<L>  |  12.4  ----------------------------<  171<H>  3.8   |  29.0  |  0.72    Ca    8.3<L>      11 Dec 2021 19:59  Phos  3.1     12-11  Mg     2.2     12-12    
                [place] [facility] - [facility], THE HEART CENTER                                   [address] [place] [address]                                                      PHONE: [phone]                                                         FAX: [phone]  [email or web address]  ---------------------------------------------------------------------------------------------------------------------------------    Overnight events/patient complaints:  NAD confusion     Macrobid (Other)    MEDICATIONS  (STANDING):  aspirin  chewable 81 milliGRAM(s) Oral daily  cholecalciferol 1000 Unit(s) Oral daily  dronabinol 2.5 milliGRAM(s) Oral daily  famotidine    Tablet 20 milliGRAM(s) Oral at bedtime  heparin   Injectable 5000 Unit(s) SubCutaneous every 12 hours  levothyroxine 50 MICROGram(s) Oral daily  losartan 25 milliGRAM(s) Oral daily  metoprolol succinate ER 50 milliGRAM(s) Oral daily  multivitamin 1 Tablet(s) Oral daily  pantoprazole    Tablet 40 milliGRAM(s) Oral before breakfast  QUEtiapine 12.5 milliGRAM(s) Oral daily  sodium chloride 0.9%. 1000 milliLiter(s) (60 mL/Hr) IV Continuous <Continuous>  spironolactone 25 milliGRAM(s) Oral daily    MEDICATIONS  (PRN):  diltiazem Injectable 10 milliGRAM(s) IV Push every 4 hours PRN for persistent HR of 120 or more  guaiFENesin Oral Liquid (Sugar-Free) 100 milliGRAM(s) Oral every 6 hours PRN Cough      Vital Signs Last 24 Hrs  T(C): 36.4 (13 Dec 2021 04:46), Max: 36.7 (12 Dec 2021 11:11)  T(F): 97.5 (13 Dec 2021 04:46), Max: 98 (12 Dec 2021 11:11)  HR: 86 (13 Dec 2021 04:46) (86 - 122)  BP: 128/75 (13 Dec 2021 04:46) (118/71 - 153/88)  BP(mean): --  RR: 16 (13 Dec 2021 04:46) (16 - 18)  SpO2: 96% (13 Dec 2021 04:46) (96% - 97%)  ICU Vital Signs Last 24 Hrs  TERESA ULLOA  I&O's Detail    I&O's Summary    Drug Dosing Weight  TERESA ULLOA      PHYSICAL EXAM:  General: Appears well developed, alert and cooperative.  HEENT: Head; normocephalic, atraumatic.  Eyes: Pupils reactive, cornea wnl.  Neck: Supple, no nodes adenopathy, no NVD or carotid bruit or thyromegaly.  CARDIOVASCULAR: Normal S1 and S2, 2/6 murmur, rub, gallop or lift.   LUNGS: Decrease BS B/L   ABDOMEN: Soft, nontender without mass or organomegaly. bowel sounds normoactive.  EXTREMITIES: No clubbing, cyanosis or edema. Distal pulses wnl.   SKIN: warm and dry with normal turgor.  NEURO: Alert/oriented x 0  PSYCH: normal affect.        LABS:                        13.0   13.08 )-----------( 326      ( 12 Dec 2021 15:13 )             42.7     12-12    133<L>  |  98  |  11.8  ----------------------------<  171<H>  4.8   |  20.0<L>  |  0.60    Ca    9.0      12 Dec 2021 15:13  Phos  3.1     12-11  Mg     2.2     12-12      TERESA ULLOA      RADIOLOGY & ADDITIONAL STUDIES:    INTERPRETATION OF TELEMETRY (personally reviewed):       ECHO:     < from: TTE Echo Complete w/o Contrast w/ Doppler (12.09.21 @ 15:31) >  Summary:   1. Endocardial visualization was enhanced with intravenous echo contrast.   2. Severely enlarged left atrium.   3. Segmental wall motion abnormalities in distal LAD territory. 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   4. Left ventricular ejection fraction, by visual estimation, is 35 to   40%.   5. Severelyenlarged right atrium.   6. Mildly enlarged right ventricle. RV systolic function is normal.   7. Mild mitral valve regurgitation.   8. Mild tricuspid regurgitation.   9. Trivial pericardial effusion.  10. Moderate pleural effusion in both left and right lateral regions.  11. [facility] cardiology oncall team informed. Dr. yuri Mchugh MD, RPVI Electronically signed on 12/9/2021 at 7:42:21 PM    < end of copied text >        ECHO:  10/2019:  EF 65-70%. Normal LV, RV function. No significant valvular disease.    STRESS TEST:  11/2019:  Coronary calcification in LAD, LCx. Normal perfusion. EF > 70%.      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 The LV aneurysm is filled with thrombus measuring 2.8 x 1.6 cm; acute systolic HF likely due to ICM; AF RVR TELE stable overnight 90's     Plan  1.  Medical therapy from cardiovascular standpoint and not a good candidate for invasive therapy   2.  Continue current optimal medical therapy   3.  Hold Lasix for now   4.  Change Cardizem to Toprol XL    4.  Continue ASA 81 mg po daily and not good candidate for long term AC due to bleeding and fall risk     poor prognosis and agree with Hospice evaluation      D/W with granddaughter who is care giver   
                [place] [facility] - [facility], THE HEART CENTER                                   [address] [place] [address]                                                      PHONE: [phone]                                                         FAX: [phone]  [email or web address]  ---------------------------------------------------------------------------------------------------------------------------------    Overnight events/patient complaints:  NAD     Macrobid (Other)    MEDICATIONS  (STANDING):  aspirin  chewable 81 milliGRAM(s) Oral daily  cholecalciferol 1000 Unit(s) Oral daily  digoxin     Tablet 125 MICROGram(s) Oral daily  dronabinol 2.5 milliGRAM(s) Oral daily  famotidine    Tablet 20 milliGRAM(s) Oral at bedtime  heparin   Injectable 5000 Unit(s) SubCutaneous every 12 hours  levothyroxine 50 MICROGram(s) Oral daily  losartan 25 milliGRAM(s) Oral daily  metoprolol succinate ER 50 milliGRAM(s) Oral once  multivitamin 1 Tablet(s) Oral daily  pantoprazole    Tablet 40 milliGRAM(s) Oral before breakfast  QUEtiapine 12.5 milliGRAM(s) Oral daily  spironolactone 25 milliGRAM(s) Oral daily    MEDICATIONS  (PRN):  diltiazem Injectable 10 milliGRAM(s) IV Push every 4 hours PRN for persistent HR of 120 or more  guaiFENesin Oral Liquid (Sugar-Free) 100 milliGRAM(s) Oral every 6 hours PRN Cough      Vital Signs Last 24 Hrs  T(C): 36.5 (15 Dec 2021 10:02), Max: 37.4 (15 Dec 2021 04:59)  T(F): 97.7 (15 Dec 2021 10:02), Max: 99.4 (15 Dec 2021 04:59)  HR: 97 (15 Dec 2021 10:02) (61 - 131)  BP: 138/82 (15 Dec 2021 10:02) (110/92 - 157/95)  BP(mean): --  RR: 20 (15 Dec 2021 10:02) (18 - 20)  SpO2: 97% (15 Dec 2021 10:02) (94% - 97%)  ICU Vital Signs Last 24 Hrs  TERESA ULLOA  I&O's Detail    14 Dec 2021 07:01  -  15 Dec 2021 07:00  --------------------------------------------------------  IN:  Total IN: 0 mL    OUT:    Voided (mL): 1200 mL  Total OUT: 1200 mL    Total NET: -1200 mL        I&O's Summary    14 Dec 2021 07:01  -  15 Dec 2021 07:00  --------------------------------------------------------  IN: 0 mL / OUT: 1200 mL / NET: -1200 mL      Drug Dosing Weight  TERESA EDWARDJOVANA      PHYSICAL EXAM:  General: Appears well developed, alert and cooperative.  HEENT: Head; normocephalic, atraumatic.  Eyes: Pupils reactive, cornea wnl.  Neck: Supple, no nodes adenopathy, no NVD or carotid bruit or thyromegaly.  CARDIOVASCULAR: Normal S1 and S2, 2/6 murmur, rub, gallop or lift.   LUNGS: No rales, rhonchi or wheeze. Normal breath sounds bilaterally.  ABDOMEN: Soft, nontender without mass or organomegaly. bowel sounds normoactive.  EXTREMITIES: No clubbing, cyanosis or edema. Distal pulses wnl.   SKIN: warm and dry with normal turgor.  NEURO: Alert/oriented x 0  PSYCH: normal affect.        LABS:                        12.0   13.00 )-----------( 418      ( 14 Dec 2021 07:42 )             38.8     12-15    141  |  102  |  x   ----------------------------<  x   3.5   |  24.0  |  x     Ca    8.6      14 Dec 2021 07:42      TERESA ULLOA            RADIOLOGY & ADDITIONAL STUDIES:    INTERPRETATION OF TELEMETRY (personally reviewed): AF mild RVR       ECHO:     < from: TTE Echo Complete w/o Contrast w/ Doppler (12.09.21 @ 15:31) >  Summary:   1. Endocardial visualization was enhanced with intravenous echo contrast.   2. Severely enlarged left atrium.   3. Segmental wall motion abnormalities in distal LAD territory. 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   4. Left ventricular ejection fraction, by visual estimation, is 35 to   40%.   5. Severelyenlarged right atrium.   6. Mildly enlarged right ventricle. RV systolic function is normal.   7. Mild mitral valve regurgitation.   8. Mild tricuspid regurgitation.   9. Trivial pericardial effusion.  10. Moderate pleural effusion in both left and right lateral regions.  11. [facility] cardi[facility] oncall team informed. Dr. yuri Mchugh MD, RPVI Electronically signed on 12/9/2021 at 7:42:21 PM    < end of copied text >    ECHO:  10/2019:  EF 65-70%. Normal LV, RV function. No significant valvular disease.    STRESS TEST:  11/2019:  Coronary calcification in LAD, LCx. Normal perfusion. EF > 70%.      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 The LV aneurysm is filled with thrombus measuring 2.8 x 1.6 cm; acute systolic HF likely due to ICM; AF RVR TELE stable overnight 100's     Plan  Agree with Toprol XL   Add Dig 0.125mg daily   Continue current CV medications     Continue ASA 81 mg po daily and not good candidate for long term AC due to bleeding and fall risk     Poor prognosis and agree with Hospice evaluation  
                [place] [facility] - [facility], THE HEART CENTER                                   [address] [place] [address]                                                      PHONE: [phone]                                                         FAX: [phone]  [email or web address]  ---------------------------------------------------------------------------------------------------------------------------------    Overnight events/patient complaints:  NSVT.  No cp.    PAST MEDICAL & SURGICAL HISTORY:  GERD (gastroesophageal reflux disease)    Hypothyroidism    Arthritis    HTN (hypertension)    H/O:     H/O hernia repair        Macrobid (Other)    MEDICATIONS  (STANDING):  aspirin  chewable 81 milliGRAM(s) Oral daily  cholecalciferol 1000 Unit(s) Oral daily  famotidine    Tablet 20 milliGRAM(s) Oral at bedtime  furosemide    Tablet 40 milliGRAM(s) Oral daily  heparin   Injectable 5000 Unit(s) SubCutaneous every 12 hours  levothyroxine 50 MICROGram(s) Oral daily  losartan 25 milliGRAM(s) Oral daily  metoprolol succinate ER 25 milliGRAM(s) Oral daily  multivitamin 1 Tablet(s) Oral daily  pantoprazole    Tablet 40 milliGRAM(s) Oral before breakfast  QUEtiapine 12.5 milliGRAM(s) Oral daily  spironolactone 25 milliGRAM(s) Oral daily    MEDICATIONS  (PRN):  albuterol/ipratropium for Nebulization 3 milliLiter(s) Nebulizer every 6 hours PRN Shortness of Breath and/or Wheezing  guaiFENesin Oral Liquid (Sugar-Free) 100 milliGRAM(s) Oral every 6 hours PRN Cough      Vital Signs Last 24 Hrs  T(C): 36.5 (11 Dec 2021 09:15), Max: 37.1 (10 Dec 2021 16:24)  T(F): 97.7 (11 Dec 2021 09:15), Max: 98.8 (10 Dec 2021 16:24)  HR: 113 (11 Dec 2021 09:15) (104 - 132)  BP: 121/75 (11 Dec 2021 09:15) (119/76 - 153/81)  BP(mean): --  RR: 18 (11 Dec 2021 09:15) (18 - 19)  SpO2: 96% (11 Dec 2021 09:15) (94% - 98%)  ICU Vital Signs Last 24 Hrs  TERESA ULLOA  I&O's Detail    I&O's Summary    Drug Dosing Weight  TERESA ULLOA      PHYSICAL EXAM:  General: Appears well developed, well nourished alert and cooperative.  HEENT: Head; normocephalic, atraumatic.  Eyes: Pupils reactive, cornea wnl.  Neck: Supple, no nodes adenopathy, no NVD or carotid bruit or thyromegaly.  CARDIOVASCULAR: Normal S1 and S2, No murmur, rub, gallop or lift.   LUNGS: No rales, rhonchi or wheeze. Normal breath sounds bilaterally.  ABDOMEN: Soft, nontender without mass or organomegaly. bowel sounds normoactive.  EXTREMITIES: No clubbing, cyanosis or edema. Distal pulses wnl.   SKIN: warm and dry with normal turgor.  NEURO: Alert/oriented x 3/normal motor exam. No pathologic reflexes.    PSYCH: normal affect.        LABS:          TERESA ULLOA            RADIOLOGY & ADDITIONAL STUDIES:    INTERPRETATION OF TELEMETRY (personally reviewed):    ECG:    ECHO:    STRESS TEST:    CARDIAC CATHETERIZATION:    ASSESSMENT AND PLAN:  In summary, TERESA ULLOA is an 89y Female with  probable recent MI w./ LV aneurysm presented w mild chf w LE edema-now resolved.  Low k.  At this point-would stop lasix and replete k to 4 and make sure Mg is in normal range/discussed w PA.    Thank you for allowing [facility] to participate in the care of this patient.  Please feel free to call with any questions or concerns. 
                [place] [facility] - [facility], THE HEART CENTER                                   [address] [place] [address]                                                      PHONE: [phone]                                                         FAX: [phone]  [email or web address]  ---------------------------------------------------------------------------------------------------------------------------------    Overnight events/patient complaints:  NAD feeling better today; + confusion on 1:1    Macrobid (Other)    MEDICATIONS  (STANDING):  cholecalciferol 1000 Unit(s) Oral daily  famotidine    Tablet 20 milliGRAM(s) Oral at bedtime  furosemide   Injectable 40 milliGRAM(s) IV Push two times a day  heparin   Injectable 5000 Unit(s) SubCutaneous every 12 hours  levothyroxine 50 MICROGram(s) Oral daily  losartan 25 milliGRAM(s) Oral daily  metoprolol succinate ER 25 milliGRAM(s) Oral daily  multivitamin 1 Tablet(s) Oral daily  pantoprazole    Tablet 40 milliGRAM(s) Oral before breakfast  QUEtiapine 12.5 milliGRAM(s) Oral daily    MEDICATIONS  (PRN):  albuterol/ipratropium for Nebulization 3 milliLiter(s) Nebulizer every 6 hours PRN Shortness of Breath and/or Wheezing  guaiFENesin Oral Liquid (Sugar-Free) 100 milliGRAM(s) Oral every 6 hours PRN Cough      Vital Signs Last 24 Hrs  T(C): 37 (10 Dec 2021 09:43), Max: 37 (10 Dec 2021 09:43)  T(F): 98.6 (10 Dec 2021 09:43), Max: 98.6 (10 Dec 2021 09:43)  HR: 117 (10 Dec 2021 09:43) (93 - 117)  BP: 132/62 (10 Dec 2021 09:43) (132/62 - 163/98)  BP(mean): --  RR: 18 (10 Dec 2021 09:43) (18 - 19)  SpO2: 96% (10 Dec 2021 09:43) (94% - 96%)  ICU Vital Signs Last 24 Hrs  TERESA ULLOA  I&O's Detail    09 Dec 2021 07:01  -  10 Dec 2021 07:00  --------------------------------------------------------  IN:  Total IN: 0 mL    OUT:    Voided (mL): 800 mL  Total OUT: 800 mL    Total NET: -800 mL        I&O's Summary    09 Dec 2021 07:01  -  10 Dec 2021 07:00  --------------------------------------------------------  IN: 0 mL / OUT: 800 mL / NET: -800 mL      Drug Dosing Weight  TERESA ULLOA      PHYSICAL EXAM:  General: Appears well developed, alert and cooperative.  HEENT: Head; normocephalic, atraumatic.  Eyes: Pupils reactive, cornea wnl.  Neck: Supple, no nodes adenopathy, no NVD or carotid bruit or thyromegaly.  CARDIOVASCULAR: Normal S1 and S2, 1/6 murmur, rub, gallop or lift.   LUNGS: Decrease BS B/L at the lower bases   ABDOMEN: Soft, nontender without mass or organomegaly. bowel sounds normoactive.  EXTREMITIES: No clubbing, cyanosis or edema. Distal pulses wnl.   SKIN: warm and dry with normal turgor.  NEURO: Alert/oriented x 1    PSYCH: normal affect.        LABS:                        10.8   9.01  )-----------( 321      ( 09 Dec 2021 08:56 )             35.4     12-    141  |  103  |  18.2  ----------------------------<  74  3.4<L>   |  21.0<L>  |  0.64    Ca    9.0      09 Dec 2021 08:56  Mg     1.8     12-    TPro  6.0<L>  /  Alb  3.3  /  TBili  0.5  /  DBili  x   /  AST  12  /  ALT  18  /  AlkPhos  107  12-08    [facility]  CARDIAC MARKERS ( 09 Dec 2021 08:56 )  x     / 0.04 ng/mL / x     / x     / x      CARDIAC MARKERS ( 08 Dec 2021 15:52 )  x     / 0.01 ng/mL / x     / x     / x            Urinalysis Basic - ( 09 Dec 2021 04:17 )    Color: Yellow / Appearance: Clear / S.010 / pH: x  Gluc: x / Ketone: Negative  / Bili: Negative / Urobili: Negative mg/dL   Blood: x / Protein: Negative mg/dL / Nitrite: Negative   Leuk Esterase: Negative / RBC: 3-5 /HPF / WBC 0-2   Sq Epi: x / Non Sq Epi: Occasional / Bacteria: Occasional        RADIOLOGY & ADDITIONAL STUDIES:    INTERPRETATION OF TELEMETRY (personally reviewed):    ECHO:     < from: TTE Echo Complete w/o Contrast w/ Doppler (21 @ 15:31) >  Summary:   1. Endocardial visualization was enhanced with intravenous echo contrast.   2. Severely enlarged left atrium.   3. Segmental wall motion abnormalities in distal LAD territory. 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   4. Left ventricular ejection fraction, by visual estimation, is 35 to   40%.   5. Severelyenlarged right atrium.   6. Mildly enlarged right ventricle. RV systolic function is normal.   7. Mild mitral valve regurgitation.   8. Mild tricuspid regurgitation.   9. Trivial pericardial effusion.  10. Moderate pleural effusion in both left and right lateral regions.  11. [facility] cardiology oncall team informed. Dr. yuri Mchugh MD, RPVI Electronically signed on 2021 at 7:42:21 PM    < end of copied text >        ECHO:  10/2019:  EF 65-70%. Normal LV, RV function. No significant valvular disease.    STRESS TEST:  2019:  Coronary calcification in LAD, LCx. Normal perfusion. EF > 70%.      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 The LV aneurysm is filled with thrombus measuring 2.8 x 1.6 cm; acute systolic HF likely due to ICM    Plan  1.  Medical therapy from cardiovascular standpoint and not a good candidate for invasive therapy   2.  Continue current optimal medical therapy   3.  Change to po Lasix 40 mg po daily   4.  ASA 81 mg po daily and not good candidate for long term AC due to bleeding and fall risk     poor prognosis     D/W with granddaughter who is care giver     
                [place] [facility] - [facility], [facility]                                   [address] [place] [address]                                                      PHONE: [phone]                                                         FAX: [phone]  [email or web address]  ---------------------------------------------------------------------------------------------------------------------------------    Overnight events/patient complaints:  NAD feeling better today     Macrobid (Other)    MEDICATIONS  (STANDING):  cholecalciferol 1000 Unit(s) Oral daily  famotidine    Tablet 20 milliGRAM(s) Oral at bedtime  furosemide   Injectable 40 milliGRAM(s) IV Push two times a day  heparin   Injectable 5000 Unit(s) SubCutaneous every 12 hours  levothyroxine 50 MICROGram(s) Oral daily  metoprolol succinate ER 25 milliGRAM(s) Oral daily  multivitamin 1 Tablet(s) Oral daily  pantoprazole    Tablet 40 milliGRAM(s) Oral before breakfast    MEDICATIONS  (PRN):  albuterol/ipratropium for Nebulization 3 milliLiter(s) Nebulizer every 6 hours PRN Shortness of Breath and/or Wheezing  guaiFENesin Oral Liquid (Sugar-Free) 100 milliGRAM(s) Oral every 6 hours PRN Cough      Vital Signs Last 24 Hrs  T(C): 36.9 (09 Dec 2021 08:14), Max: 36.9 (09 Dec 2021 08:14)  T(F): 98.5 (09 Dec 2021 08:14), Max: 98.5 (09 Dec 2021 08:14)  HR: 97 (09 Dec 2021 08:14) (93 - 105)  BP: 155/99 (09 Dec 2021 08:14) (133/86 - 155/99)  BP(mean): --  RR: 18 (09 Dec 2021 08:14) (16 - 18)  SpO2: 97% (09 Dec 2021 08:14) (96% - 98%)  ICU Vital Signs Last 24 Hrs  TERESA LAILA  I&O's Detail    I&O's Summary    Drug Dosing Weight  TERESA ULLOA      PHYSICAL EXAM:  General: Appears well developed, alert and cooperative.  HEENT: Head; normocephalic, atraumatic.  Eyes: Pupils reactive, cornea wnl.  Neck: Supple, no nodes adenopathy, no NVD or carotid bruit or thyromegaly.  CARDIOVASCULAR: Normal S1 and S2, 2/6 murmur, rub, gallop or lift.   LUNGS: Decrease BS B/L   ABDOMEN: Soft, nontender without mass or organomegaly. bowel sounds normoactive.  EXTREMITIES: No clubbing, cyanosis ++ edema. Distal pulses wnl.   SKIN: warm and dry with normal turgor.  NEURO: Alert/oriented x 2/normal motor exam. No pathologic reflexes.    PSYCH: normal affect.        LABS:                        10.8   9.01  )-----------( 321      ( 09 Dec 2021 08:56 )             35.4     12-    141  |  103  |  18.2  ----------------------------<  74  3.4<L>   |  21.0<L>  |  0.64    Ca    9.0      09 Dec 2021 08:56  Mg     1.8     12-    TPro  6.0<L>  /  Alb  3.3  /  TBili  0.5  /  DBili  x   /  AST  12  /  ALT  18  /  AlkPhos  107  12-08    TERESA ULLOA  CARDIAC MARKERS ( 09 Dec 2021 08:56 )  x     / 0.04 ng/mL / x     / x     / x      CARDIAC MARKERS ( 08 Dec 2021 15:52 )  x     / 0.01 ng/mL / x     / x     / x            Urinalysis Basic - ( 09 Dec 2021 04:17 )    Color: Yellow / Appearance: Clear / S.010 / pH: x  Gluc: x / Ketone: Negative  / Bili: Negative / Urobili: Negative mg/dL   Blood: x / Protein: Negative mg/dL / Nitrite: Negative   Leuk Esterase: Negative / RBC: 3-5 /HPF / WBC 0-2   Sq Epi: x / Non Sq Epi: Occasional / Bacteria: Occasional        RADIOLOGY & ADDITIONAL STUDIES:    INTERPRETATION OF TELEMETRY (personally reviewed):        ECHO: Pending       ECHO:  10/2019:  EF 65-70%. Normal LV, RV function. No significant valvular disease.    STRESS TEST:  2019:  Coronary calcification in LAD, LCx. Normal perfusion. EF > 70%.      ASSESSMENT AND PLAN:  In summary, TERESA ULLOA is an 89y Female with past medical history significant for hypertension, CVA, atrial fibrillation not on anticoagulation due to history of ICH, peripheral artery disease who presents with acute heart failure likely HFpEF  clinically improved with IV Lasix; Prior PET CT normal perfusion LAD and LCx calcifications     Plan  1.  Continue IV Lasix and monitor renal panel  2.  Start losartan 25 mg po daily   3.  Awaiting TTE to evaluate LV EF and rule out any significant valvular disease     
                [place] [facility] - [facility]                                   [address] [place] [address]                                                      PHONE: [phone]                                                         FAX: [phone]  [email or web address]  ---------------------------------------------------------------------------------------------------------------------------------    Overnight events/patient complaints: no complaints, n good spirits, tele revealed Af with CVR      Macrobid (Other)    MEDICATIONS  (STANDING):  aspirin  chewable 81 milliGRAM(s) Oral daily  cholecalciferol 1000 Unit(s) Oral daily  dronabinol 2.5 milliGRAM(s) Oral daily  famotidine    Tablet 20 milliGRAM(s) Oral at bedtime  heparin   Injectable 5000 Unit(s) SubCutaneous every 12 hours  levothyroxine 50 MICROGram(s) Oral daily  losartan 25 milliGRAM(s) Oral daily  metoprolol succinate ER 50 milliGRAM(s) Oral daily  multivitamin 1 Tablet(s) Oral daily  pantoprazole    Tablet 40 milliGRAM(s) Oral before breakfast  QUEtiapine 12.5 milliGRAM(s) Oral daily  sodium chloride 0.9%. 1000 milliLiter(s) (60 mL/Hr) IV Continuous <Continuous>  spironolactone 25 milliGRAM(s) Oral daily    MEDICATIONS  (PRN):  diltiazem Injectable 10 milliGRAM(s) IV Push every 4 hours PRN for persistent HR of 120 or more  guaiFENesin Oral Liquid (Sugar-Free) 100 milliGRAM(s) Oral every 6 hours PRN Cough      Vital Signs Last 24 Hrs  T(C): 36.4 (14 Dec 2021 05:40), Max: 37 (13 Dec 2021 15:50)  T(F): 97.6 (14 Dec 2021 05:40), Max: 98.6 (13 Dec 2021 15:50)  HR: 89 (14 Dec 2021 05:40) (86 - 96)  BP: 144/69 (14 Dec 2021 06:00) (119/72 - 168/74)  BP(mean): --  RR: 18 (14 Dec 2021 05:40) (16 - 18)  SpO2: 94% (14 Dec 2021 05:40) (94% - 99%)  Daily     Daily   ICU Vital Signs Last 24 Hrs  TERESA ULLOA  I&O's Detail    13 Dec 2021 07:01  -  14 Dec 2021 07:00  --------------------------------------------------------  IN:  Total IN: 0 mL    OUT:    Intermittent Catheterization - Urethral (mL): 500 mL  Total OUT: 500 mL    Total NET: -500 mL        I&O's Summary    13 Dec 2021 07:01  -  14 Dec 2021 07:00  --------------------------------------------------------  IN: 0 mL / OUT: 500 mL / NET: -500 mL      Drug Dosing Weight  TERESA ULLOA      PHYSICAL EXAM:  General: Appears well developed, well nourished alert and cooperative.  HEENT: Head; normocephalic, atraumatic.  Eyes: Pupils reactive, cornea wnl.  Neck: Supple, no nodes adenopathy, no NVD or carotid bruit or thyromegaly.  CARDIOVASCULAR: Normal S1 and S2, No murmur, rub, gallop or lift.   LUNGS: No rales, rhonchi or wheeze. Normal breath sounds bilaterally.  ABDOMEN: Soft, nontender without mass or organomegaly. bowel sounds normoactive.  EXTREMITIES: No clubbing, cyanosis or edema. Distal pulses wnl.   SKIN: warm and dry with normal turgor.  NEURO: Alert/oriented x 3/normal motor exam. No pathologic reflexes.    PSYCH: normal affect.        LABS:                        12.0   13.00 )-----------( 418      ( 14 Dec 2021 07:42 )             38.8     12-14    139  |  105  |  14.2  ----------------------------<  97  4.0   |  23.0  |  0.56    Ca    8.6      14 Dec 2021 07:42      TERESA ULLOA            RADIOLOGY & ADDITIONAL STUDIES:< from: Xray Chest 1 View-PORTABLE IMMEDIATE (12.08.21 @ 16:43) >  IMPRESSION:  Bilateral pleural effusions/adjacent atelectasis/possible consolidation,   increased    --- End of Report ---            MARILEE BIRMINGHAM DO; Attending Radiologist  This document has been electronically signed. Dec  9 2021  8:35AM    < end of copied text >      INTERPRETATION OF TELEMETRY (personally reviewed):    ECG:< from: 12 Lead ECG (12.08.21 @ 14:33) >  Diagnosis Line *** Poor data quality, interpretation may be adversely affected  Atrial fibrillation with rapid ventricular response with premature ventricular or aberrantly conducted complexes  Indeterminate axis  Low voltage QRS  Septal infarct , age undetermined  Possible Lateral infarct , age undetermined  Abnormal ECG      < end of copied text >      ECHO:< from: TTE Echo Complete w/o Contrast w/ Doppler (12.09.21 @ 15:31) >  Summary:   1. Endocardial visualization was enhanced with intravenous echo contrast.   2. Severely enlarged left atrium.   3. Segmental wall motion abnormalities in distal LAD territory. 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   4. Left ventricular ejection fraction, by visual estimation, is 35 to   40%.   5. Severelyenlarged right atrium.   6. Mildly enlarged right ventricle. RV systolic function is normal.    < end of copied text >          
                [place] [facility] - [facility], [facility]                                   [address] [place] [address]                                                      PHONE: [phone]                                                         FAX: [phone]  [email or web address]  ---------------------------------------------------------------------------------------------------------------------------------    Overnight events/patient complaints:  Confused/agitated overnite.    PAST MEDICAL & SURGICAL HISTORY:  GERD (gastroesophageal reflux disease)    Hypothyroidism    Arthritis    HTN (hypertension)    H/O:     H/O hernia repair        Macrobid (Other)    MEDICATIONS  (STANDING):    MEDICATIONS  (PRN):      Vital Signs Last 24 Hrs  T(C): 36.4 (12 Dec 2021 07:53), Max: 37 (12 Dec 2021 03:15)  T(F): 97.5 (12 Dec 2021 07:53), Max: 98.6 (12 Dec 2021 03:15)  HR: 112 (12 Dec 2021 07:53) (111 - 143)  BP: 151/82 (12 Dec 2021 07:53) (134/81 - 151/82)  BP(mean): --  RR: 18 (12 Dec 2021 07:53) (17 - 18)  SpO2: 97% (12 Dec 2021 07:53) (96% - 97%)  ICU Vital Signs Last 24 Hrs  TERESA ULLOA  I&O's Detail    I&O's Summary    Drug Dosing Weight  TERESA ULLOA      PHYSICAL EXAM:  General: Appears well developed, well nourished alert and cooperative.  HEENT: Head; normocephalic, atraumatic.  Eyes: Pupils reactive, cornea wnl.  Neck: Supple, no nodes adenopathy, no NVD or carotid bruit or thyromegaly.  CARDIOVASCULAR: Normal S1 and S2, No murmur, rub, gallop or lift.   LUNGS: No rales, rhonchi or wheeze. Normal breath sounds bilaterally.  ABDOMEN: Soft, nontender without mass or organomegaly. bowel sounds normoactive.  EXTREMITIES: No clubbing, cyanosis or edema. Distal pulses wnl.   SKIN: warm and dry with normal turgor.  NEURO: Confused  PSYCH: flat affect        LABS:                        11.6   10.78 )-----------( 430      ( 11 Dec 2021 11:07 )             37.2     12-11    138  |  97<L>  |  12.4  ----------------------------<  171<H>  3.8   |  29.0  |  0.72    Ca    8.3<L>      11 Dec 2021 19:59  Phos  3.1     12-11  Mg     2.2     12-12      TERESA ULLOA            RADIOLOGY & ADDITIONAL STUDIES:    INTERPRETATION OF TELEMETRY (personally reviewed):    ECG:    ECHO:    STRESS TEST:    CARDIAC CATHETERIZATION:    ASSESSMENT AND PLAN:  In summary, TERESA ULLOA is an 89y Female with past medical history significant for LV dysfunction w/ likely recent AWMO w LV thrombus and presented w mild CHF/now resolved.  Hold diuretics and replete K.  Will likely need to go to rehab/very weak,.    Thank you for allowing [facility] to participate in the care of this patient.  Please feel free to call with any questions or concerns. 
Patient is a 89y old  Female who presents with a chief complaint of CHF (09 Dec 2021 09:43)      INTERVAL HPI/OVERNIGHT EVENTS: seen and examined in ED. Pts daughter at bedside. Pt is awake and alert, but does not know why she is in the hospital.   Per daughter, pt is not eating, not as active as before and noted to have LE edema for the past couple of days.   Also, coughing while eating     MEDICATIONS  (STANDING):  cholecalciferol 1000 Unit(s) Oral daily  famotidine    Tablet 20 milliGRAM(s) Oral at bedtime  furosemide   Injectable 40 milliGRAM(s) IV Push two times a day  heparin   Injectable 5000 Unit(s) SubCutaneous every 12 hours  levothyroxine 50 MICROGram(s) Oral daily  losartan 25 milliGRAM(s) Oral daily  metoprolol succinate ER 25 milliGRAM(s) Oral daily  multivitamin 1 Tablet(s) Oral daily  pantoprazole    Tablet 40 milliGRAM(s) Oral before breakfast    MEDICATIONS  (PRN):  albuterol/ipratropium for Nebulization 3 milliLiter(s) Nebulizer every 6 hours PRN Shortness of Breath and/or Wheezing  guaiFENesin Oral Liquid (Sugar-Free) 100 milliGRAM(s) Oral every 6 hours PRN Cough      Allergies    Macrobid (Other)    Intolerances        REVIEW OF SYSTEMS:  CONSTITUTIONAL: No fever, weight loss, or fatigue  RESPIRATORY: No cough, wheezing, chills or hemoptysis; No shortness of breath  CARDIOVASCULAR: No chest pain, palpitations, dizziness, or leg swelling  GASTROINTESTINAL: No abdominal or epigastric pain. No nausea, vomiting, or hematemesis; No diarrhea or constipation. No melena or hematochezia.  NEUROLOGICAL: No headaches, memory loss, loss of strength, numbness, or tremors  MUSCULOSKELETAL: No joint pain or swelling; No muscle, back, or extremity pain      Vital Signs Last 24 Hrs  T(C): 36.7 (09 Dec 2021 11:18), Max: 36.9 (09 Dec 2021 08:14)  T(F): 98.1 (09 Dec 2021 11:18), Max: 98.5 (09 Dec 2021 08:14)  HR: 93 (09 Dec 2021 11:18) (93 - 97)  BP: 163/98 (09 Dec 2021 11:18) (133/86 - 163/98)  BP(mean): --  RR: 18 (09 Dec 2021 11:18) (16 - 18)  SpO2: 94% (09 Dec 2021 11:18) (94% - 98%)    PHYSICAL EXAM:  GENERAL: thin, pleasant elderly lady, sitting on the bed   HEAD:  temporal wasting   EYES: EOMI, PERRLA, conjunctiva and sclera clear  NECK: Supple, No JVD, Normal thyroid  NERVOUS SYSTEM:  Alert & Oriented X2, No gross focal deficits  CHEST/LUNG: Clear to percussion bilaterally; No rales, rhonchi, wheezing, or rubs  HEART: Regular rate and rhythm; No murmurs, rubs, or gallops  ABDOMEN: Soft, Nontender, Nondistended; Bowel sounds present  EXTREMITIES:  LE edema   SKIN: No rashes or lesions    LABS:                        10.8   9.01  )-----------( 321      ( 09 Dec 2021 08:56 )             35.4     12-    141  |  103  |  18.2  ----------------------------<  74  3.4<L>   |  21.0<L>  |  0.64    Ca    9.0      09 Dec 2021 08:56  Mg     1.8     12-    TPro  6.0<L>  /  Alb  3.3  /  TBili  0.5  /  DBili  x   /  AST  12  /  ALT  18  /  AlkPhos  107  12-08      Urinalysis Basic - ( 09 Dec 2021 04:17 )    Color: Yellow / Appearance: Clear / S.010 / pH: x  Gluc: x / Ketone: Negative  / Bili: Negative / Urobili: Negative mg/dL   Blood: x / Protein: Negative mg/dL / Nitrite: Negative   Leuk Esterase: Negative / RBC: 3-5 /HPF / WBC 0-2   Sq Epi: x / Non Sq Epi: Occasional / Bacteria: Occasional      CAPILLARY BLOOD GLUCOSE          RADIOLOGY & ADDITIONAL TESTS:    Imaging Personally Reviewed:  [ ] YES  [ ] NO    Consultant(s) Notes Reviewed:  [ x] YES  [ ] NO    Care Discussed with Consultants/Other Providers [ ] YES  [ ] NO    Plan of Care discussed with Housestaff [ ]YES [ ] NO
HEALTH ISSUES - PROBLEM Dx:    CHF    INTERVAL HPI/OVERNIGHT EVENTS:    frail built, weak  confused   more awake and interactive today  had an appetite and had full BF per family  offers no complaints  family at bedside    REVIEW OF SYSTEMS:    as above    Vital Signs Last 24 Hrs  T(C): 37 (13 Dec 2021 15:50), Max: 37 (13 Dec 2021 15:50)  T(F): 98.6 (13 Dec 2021 15:50), Max: 98.6 (13 Dec 2021 15:50)  HR: 87 (13 Dec 2021 15:50) (86 - 87)  BP: 168/74 (13 Dec 2021 15:50) (118/71 - 168/74)  BP(mean): --  RR: 16 (13 Dec 2021 15:50) (16 - 17)  SpO2: 98% (13 Dec 2021 15:50) (96% - 99%)    PHYSICAL EXAM-  GENERAL: frail bulit, lethargic, awake and integrative today  HEAD:  Atraumatic, Normocephalic  EYES: EOMI, conjunctiva and sclera clear  ENT:  Moist mucous membranes,  No lesions  NECK: Supple, No JVD, Normal thyroid  NERVOUS SYSTEM:  Alert & Oriented X 1-2, with some confusion and irrelavant talks in between, Motor Strength 5/5 B/L upper and lower extremities  CHEST/LUNG: CTA bilaterally; No rales, rhonchi, wheezing, or rubs  HEART: IRRegular rate and rhythm; No murmurs, rubs, or gallops  ABDOMEN: Soft, Nontender, Nondistended; Bowel sounds present  EXTREMITIES:  2+ Peripheral Pulses, No clubbing, cyanosis, or edema  SKIN: No rashes or lesions      MEDICATIONS  (STANDING):  aspirin  chewable 81 milliGRAM(s) Oral daily  cholecalciferol 1000 Unit(s) Oral daily  dronabinol 2.5 milliGRAM(s) Oral daily  famotidine    Tablet 20 milliGRAM(s) Oral at bedtime  heparin   Injectable 5000 Unit(s) SubCutaneous every 12 hours  levothyroxine 50 MICROGram(s) Oral daily  losartan 25 milliGRAM(s) Oral daily  metoprolol succinate ER 50 milliGRAM(s) Oral daily  multivitamin 1 Tablet(s) Oral daily  pantoprazole    Tablet 40 milliGRAM(s) Oral before breakfast  QUEtiapine 12.5 milliGRAM(s) Oral daily  sodium chloride 0.9%. 1000 milliLiter(s) (60 mL/Hr) IV Continuous <Continuous>  spironolactone 25 milliGRAM(s) Oral daily    MEDICATIONS  (PRN):  diltiazem Injectable 10 milliGRAM(s) IV Push every 4 hours PRN for persistent HR of 120 or more  guaiFENesin Oral Liquid (Sugar-Free) 100 milliGRAM(s) Oral every 6 hours PRN Cough      LABS:                        12.5   13.47 )-----------( 388      ( 13 Dec 2021 09:29 )             40.5     12-13    138  |  101  |  13.1  ----------------------------<  140<H>  4.1   |  24.0  |  0.54    Ca    8.4<L>      13 Dec 2021 09:29  Mg     2.2     12-12    
Patient is a 89y old  Female who presents with a chief complaint of CHF (10 Dec 2021 10:16)      INTERVAL HPI/OVERNIGHT EVENTS: Significant improvement in LE edema. No complains from patient   TTE done and results reviewed      MEDICATIONS  (STANDING):  aspirin  chewable 81 milliGRAM(s) Oral daily  cholecalciferol 1000 Unit(s) Oral daily  famotidine    Tablet 20 milliGRAM(s) Oral at bedtime  furosemide    Tablet 40 milliGRAM(s) Oral daily  heparin   Injectable 5000 Unit(s) SubCutaneous every 12 hours  levothyroxine 50 MICROGram(s) Oral daily  losartan 25 milliGRAM(s) Oral daily  metoprolol succinate ER 25 milliGRAM(s) Oral daily  multivitamin 1 Tablet(s) Oral daily  pantoprazole    Tablet 40 milliGRAM(s) Oral before breakfast  QUEtiapine 12.5 milliGRAM(s) Oral daily  spironolactone 25 milliGRAM(s) Oral daily    MEDICATIONS  (PRN):  albuterol/ipratropium for Nebulization 3 milliLiter(s) Nebulizer every 6 hours PRN Shortness of Breath and/or Wheezing  guaiFENesin Oral Liquid (Sugar-Free) 100 milliGRAM(s) Oral every 6 hours PRN Cough      Allergies    Macrobid (Other)    Intolerances        REVIEW OF SYSTEMS:  CONSTITUTIONAL: No fever, weight loss, or fatigue  RESPIRATORY: No cough, wheezing, chills or hemoptysis; No shortness of breath  CARDIOVASCULAR: No chest pain, palpitations, dizziness, or leg swelling  GASTROINTESTINAL: No abdominal or epigastric pain. No nausea, vomiting, or hematemesis; No diarrhea or constipation. No melena or hematochezia.  NEUROLOGICAL: No headaches, memory loss, loss of strength, numbness, or tremors  MUSCULOSKELETAL: No joint pain or swelling; No muscle, back, or extremity pain      Vital Signs Last 24 Hrs  T(C): 37 (10 Dec 2021 09:43), Max: 37 (10 Dec 2021 09:43)  T(F): 98.6 (10 Dec 2021 09:43), Max: 98.6 (10 Dec 2021 09:43)  HR: 117 (10 Dec 2021 09:43) (100 - 117)  BP: 132/62 (10 Dec 2021 09:43) (132/62 - 151/94)  BP(mean): --  RR: 18 (10 Dec 2021 09:43) (18 - 19)  SpO2: 96% (10 Dec 2021 09:43) (95% - 96%)    PHYSICAL EXAM:  GENERAL: NAD, Sitting on the bed and eating   HEAD:  temporal wasting   EYES: EOMI, PERRLA, conjunctiva and sclera clear  NECK: Supple, No JVD, Normal thyroid  NERVOUS SYSTEM:  Alert & Oriented X2, No gross focal deficits  CHEST/LUNG: Clear to percussion bilaterally; No rales, rhonchi, wheezing, or rubs  HEART: Regular rate and rhythm; No murmurs, rubs, or gallops  ABDOMEN: Soft, Nontender, Nondistended; Bowel sounds present  EXTREMITIES:  No clubbing, cyanosis, or edema  SKIN: No rashes or lesions    LABS:                        10.8   9.01  )-----------( 321      ( 09 Dec 2021 08:56 )             35.4     12    141  |  103  |  18.2  ----------------------------<  74  3.4<L>   |  21.0<L>  |  0.64    Ca    9.0      09 Dec 2021 08:56  Mg     1.8     12-    TPro  6.0<L>  /  Alb  3.3  /  TBili  0.5  /  DBili  x   /  AST  12  /  ALT  18  /  AlkPhos  107  12-08      Urinalysis Basic - ( 09 Dec 2021 04:17 )    Color: Yellow / Appearance: Clear / S.010 / pH: x  Gluc: x / Ketone: Negative  / Bili: Negative / Urobili: Negative mg/dL   Blood: x / Protein: Negative mg/dL / Nitrite: Negative   Leuk Esterase: Negative / RBC: 3-5 /HPF / WBC 0-2   Sq Epi: x / Non Sq Epi: Occasional / Bacteria: Occasional      CAPILLARY BLOOD GLUCOSE          RADIOLOGY & ADDITIONAL TESTS:    Imaging Personally Reviewed:  [ ] YES  [ ] NO    Consultant(s) Notes Reviewed:  [ ] YES  [ ] NO    Care Discussed with Consultants/Other Providers [x ] YES  [ ] NO    Plan of Care discussed with Housestaff [ ]YES [ ] NO
HEALTH ISSUES - PROBLEM Dx:    CHF    INTERVAL HPI/OVERNIGHT EVENTS:    more awake and interactive today  has an appetite and had full BF per family  offers no complaints  family at bedside    REVIEW OF SYSTEMS:    as above    Vital Signs Last 24 Hrs  T(C): 36.4 (14 Dec 2021 10:53), Max: 37 (13 Dec 2021 15:50)  T(F): 97.5 (14 Dec 2021 10:53), Max: 98.6 (13 Dec 2021 15:50)  HR: 90 (14 Dec 2021 10:53) (86 - 96)  BP: 122/70 (14 Dec 2021 10:53) (119/72 - 168/74)  BP(mean): --  RR: 19 (14 Dec 2021 10:53) (16 - 19)  SpO2: 96% (14 Dec 2021 10:53) (94% - 99%)    PHYSICAL EXAM-  GENERAL: frail bulit, lethargic, awake and integrative today  HEAD:  Atraumatic, Normocephalic  EYES: EOMI, conjunctiva and sclera clear  ENT:  Moist mucous membranes,  No lesions  NECK: Supple, No JVD, Normal thyroid  NERVOUS SYSTEM:  Alert & Oriented X 1-2, with some confusion and irrelavant talks in between, Motor Strength 5/5 B/L upper and lower extremities  CHEST/LUNG: left basal rales+; No rhonchi, wheezing, or rubs  HEART: IRRegular rate and rhythm; No murmurs, rubs, or gallops  ABDOMEN: Soft, Nontender, Nondistended; Bowel sounds present  EXTREMITIES:  2+ Peripheral Pulses, No clubbing, cyanosis, or edema  SKIN: No rashes or lesions    MEDICATIONS  (STANDING):  aspirin  chewable 81 milliGRAM(s) Oral daily  cholecalciferol 1000 Unit(s) Oral daily  dronabinol 2.5 milliGRAM(s) Oral daily  famotidine    Tablet 20 milliGRAM(s) Oral at bedtime  furosemide    Tablet 40 milliGRAM(s) Oral daily  heparin   Injectable 5000 Unit(s) SubCutaneous every 12 hours  levothyroxine 50 MICROGram(s) Oral daily  losartan 25 milliGRAM(s) Oral daily  metoprolol succinate ER 50 milliGRAM(s) Oral daily  multivitamin 1 Tablet(s) Oral daily  pantoprazole    Tablet 40 milliGRAM(s) Oral before breakfast  QUEtiapine 12.5 milliGRAM(s) Oral daily  sodium chloride 0.9%. 1000 milliLiter(s) (60 mL/Hr) IV Continuous <Continuous>  spironolactone 25 milliGRAM(s) Oral daily    MEDICATIONS  (PRN):  diltiazem Injectable 10 milliGRAM(s) IV Push every 4 hours PRN for persistent HR of 120 or more  guaiFENesin Oral Liquid (Sugar-Free) 100 milliGRAM(s) Oral every 6 hours PRN Cough      LABS:                        12.0   13.00 )-----------( 418      ( 14 Dec 2021 07:42 )             38.8     12-14    139  |  105  |  14.2  ----------------------------<  97  4.0   |  23.0  |  0.56    Ca    8.6      14 Dec 2021 07:42

## 2021-12-15 NOTE — CHART NOTE - NSCHARTNOTEFT_GEN_A_CORE
Pt found wandering the ER by nursing staff. Put back into bed. Pleasantly confused. All vital signs stable. Haldol 2.5mg IM ordered. Will also place on 1:1 for safety. RN to notify of any acute change in pt status.
RN called to report pts rhythm  strip, 3 beats of v-tach     Patient being followed by cardiology arrythmia see below:    Medical therapy from cardiovascular standpoint and not a good candidate for invasive therapy   2.  Continue current optimal medical therapy   3.  Hold Lasix for now   4.  Change Cardizem to Toprol XL    4.  Continue ASA 81 mg po daily and not good candidate for long term AC due to bleeding and fall risk   poor prognosis and agree with Hospice evaluation      Plan:  electrolytes ordered for am
Source: Patient [ ]  Family [x ]   other [ x] EMR    Current Diet: soft with bite sized, Dash with ensure clears BID    Patient reports [ ] nausea  [ ] vomiting [ ] diarrhea [x ] constipation  [ ]chewing problems [x] swallowing issues  [ ] other:     PO intake:  < 50% [ x]   50-75%  [x ]   %  [ ]  other :    Source for PO intake [ ] Patient [x ] family [ x] chart [ ] staff [ ] other    Enteral /Parenteral Nutrition:     Current Weight: 110# bedscale today, #  none on flow sheet    % Weight Change     Pertinent Medications: MEDICATIONS  (STANDING):  aspirin  chewable 81 milliGRAM(s) Oral daily  cholecalciferol 1000 Unit(s) Oral daily  dronabinol 2.5 milliGRAM(s) Oral daily  famotidine    Tablet 20 milliGRAM(s) Oral at bedtime  furosemide    Tablet 20 milliGRAM(s) Oral daily  heparin   Injectable 5000 Unit(s) SubCutaneous every 12 hours  levothyroxine 50 MICROGram(s) Oral daily  losartan 25 milliGRAM(s) Oral daily  metoprolol succinate ER 50 milliGRAM(s) Oral daily  multivitamin 1 Tablet(s) Oral daily  pantoprazole    Tablet 40 milliGRAM(s) Oral before breakfast  QUEtiapine 12.5 milliGRAM(s) Oral daily  sodium chloride 0.9%. 1000 milliLiter(s) (60 mL/Hr) IV Continuous <Continuous>  spironolactone 25 milliGRAM(s) Oral daily    MEDICATIONS  (PRN):  diltiazem Injectable 10 milliGRAM(s) IV Push every 4 hours PRN for persistent HR of 120 or more  guaiFENesin Oral Liquid (Sugar-Free) 100 milliGRAM(s) Oral every 6 hours PRN Cough    Pertinent Labs: CBC Full  -  ( 14 Dec 2021 07:42 )  WBC Count : 13.00 K/uL  RBC Count : 4.54 M/uL  Hemoglobin : 12.0 g/dL  Hematocrit : 38.8 %  Platelet Count - Automated : 418 K/uL  Mean Cell Volume : 85.5 fl  Mean Cell Hemoglobin : 26.4 pg  Mean Cell Hemoglobin Concentration : 30.9 gm/dL  Auto Neutrophil # : x  Auto Lymphocyte # : x  Auto Monocyte # : x  Auto Eosinophil # : x  Auto Basophil # : x  Auto Neutrophil % : x  Auto Lymphocyte % : x  Auto Monocyte % : x  Auto Eosinophil % : x  Auto Basophil % : x      12-14 Na139 mmol/L Glu 97 mg/dL K+ 4.0 mmol/L Cr  0.56 mg/dL BUN 14.2 mg/dL Phos n/a   Alb n/a   PAB n/a           Skin:     Nutrition focused physical exam conducted - found signs of malnutrition [ ]absent [x ]present    Subcutaneous fat loss: [x ] Orbital fat pads region, [x ]Buccal fat region, [x ]Triceps region,  [ ]Ribs region    Muscle wasting: [ x]Temples region, [x ]Clavicle region, [x ]Shoulder region, [ ]Scapula region, [ ]Interosseous region,  [ ]thigh region, [ ]Calf region    Estimated Needs:   [x ] no change since previous assessment  [ ] recalculated:     Current Nutrition Diagnosis: Malnutrition Severe (acute on chronic) Related to inability to meet sufficient protein energy requirements in setting of advanced age with persistent lack of appetite and admission with CHF as evidenced by meeting < 75% est needs > 1month, physical signs of mod/sev muscle/fat loss, edema. Spoke with granddaughter, pt with difficulty swallowing, likes the Ensure clears, requesting LPS.       Recommendations: Continue Ensure clears TID  Rec LPS TID    Monitoring and Evaluation:   [x ] PO intake [x ] Tolerance to diet prescription [X] Weights  [X] Follow up per protocol [X] Labs:
Contacted by RN for bouts of Vtach on monitor. Nurse stated runs of Vtach inc in freq over last 60min. Longest run 10beats.  Patient seen and examined at bedside, granddaughter present. Patient asymptomatic, denies CP, SOB, lightheaded/dizziness, numbness/tingling, N/V.  Reviewed with the monitor tech, states this has been occurring for 90mins, last 30min freq of Vtach runs has increased considerably. Otherwise patient has been in Afib with occasional PVC this admission.    Vital Signs  T(C): 36.5 (12-11-21 @ 09:15), Max: 37.1 (12-10-21 @ 16:24)  HR: 113 (12-11-21 @ 09:15) (104 - 132)  BP: 121/75 (12-11-21 @ 09:15) (119/76 - 153/81)  RR: 18 (12-11-21 @ 09:15) (18 - 19)  SpO2: 96% (12-11-21 @ 09:15) (94% - 98%)    Physical Exam:  Gen: AOx3, laying in bed comfortably in NAD  CV: S1/S2, tachy, irregular  Lungs: CTA b/l, unlabored respirations on RA   Abd: +bowel sounds, soft/nt/nd  Ext: MAEx4, without edema    A/P: asymptomatic runs of Vtach  STAT EKG- Afib with occasional PVC  STAT Labs- BMP, Mg, Phos, CBC; last labs 12/9 patient was hypokalemic 3.4, pending results  F/u consult to [facility] cardiology, Cardiologist refused consult, stated repeat labs, replete as necessary, if no repletion necessary, call back is not necessary, "we have a reason for her Vtach, recent MI".  Discussed with Dr. Urbano  Patient currently on Metoprolol 25mg daily, consider inc dose   RN to monitor, assess and escalate to PA PRN.  Will continue to monitor.   Code Status: DNR

## 2021-12-22 ENCOUNTER — APPOINTMENT (OUTPATIENT)
Dept: FAMILY MEDICINE | Facility: CLINIC | Age: 86
End: 2021-12-22

## 2021-12-22 ENCOUNTER — RESULT REVIEW (OUTPATIENT)
Age: 86
End: 2021-12-22

## 2021-12-26 ENCOUNTER — RX RENEWAL (OUTPATIENT)
Age: 86
End: 2021-12-26

## 2021-12-30 PROCEDURE — U0003: CPT

## 2021-12-30 PROCEDURE — 84484 ASSAY OF TROPONIN QUANT: CPT

## 2021-12-30 PROCEDURE — 83735 ASSAY OF MAGNESIUM: CPT

## 2021-12-30 PROCEDURE — 97163 PT EVAL HIGH COMPLEX 45 MIN: CPT

## 2021-12-30 PROCEDURE — U0005: CPT

## 2021-12-30 PROCEDURE — 36415 COLL VENOUS BLD VENIPUNCTURE: CPT

## 2021-12-30 PROCEDURE — 84436 ASSAY OF TOTAL THYROXINE: CPT

## 2021-12-30 PROCEDURE — 85025 COMPLETE CBC W/AUTO DIFF WBC: CPT

## 2021-12-30 PROCEDURE — 99285 EMERGENCY DEPT VISIT HI MDM: CPT | Mod: 25

## 2021-12-30 PROCEDURE — 93005 ELECTROCARDIOGRAM TRACING: CPT

## 2021-12-30 PROCEDURE — 96375 TX/PRO/DX INJ NEW DRUG ADDON: CPT

## 2021-12-30 PROCEDURE — 87086 URINE CULTURE/COLONY COUNT: CPT

## 2021-12-30 PROCEDURE — 84100 ASSAY OF PHOSPHORUS: CPT

## 2021-12-30 PROCEDURE — 80162 ASSAY OF DIGOXIN TOTAL: CPT

## 2021-12-30 PROCEDURE — 96374 THER/PROPH/DIAG INJ IV PUSH: CPT

## 2021-12-30 PROCEDURE — 93306 TTE W/DOPPLER COMPLETE: CPT

## 2021-12-30 PROCEDURE — 83880 ASSAY OF NATRIURETIC PEPTIDE: CPT

## 2021-12-30 PROCEDURE — 71045 X-RAY EXAM CHEST 1 VIEW: CPT

## 2021-12-30 PROCEDURE — 80053 COMPREHEN METABOLIC PANEL: CPT

## 2021-12-30 PROCEDURE — 81001 URINALYSIS AUTO W/SCOPE: CPT

## 2021-12-30 PROCEDURE — 80051 ELECTROLYTE PANEL: CPT

## 2021-12-30 PROCEDURE — 80048 BASIC METABOLIC PNL TOTAL CA: CPT

## 2021-12-30 PROCEDURE — 93970 EXTREMITY STUDY: CPT

## 2021-12-30 PROCEDURE — 85027 COMPLETE CBC AUTOMATED: CPT

## 2021-12-30 PROCEDURE — 84443 ASSAY THYROID STIM HORMONE: CPT

## 2021-12-30 PROCEDURE — 92526 ORAL FUNCTION THERAPY: CPT

## 2021-12-30 PROCEDURE — 92610 EVALUATE SWALLOWING FUNCTION: CPT

## 2022-01-28 ENCOUNTER — APPOINTMENT (OUTPATIENT)
Dept: FAMILY MEDICINE | Facility: CLINIC | Age: 87
End: 2022-01-28
Payer: MEDICARE

## 2022-01-28 DIAGNOSIS — I49.9 CARDIAC ARRHYTHMIA, UNSPECIFIED: ICD-10-CM

## 2022-01-28 PROCEDURE — 99214 OFFICE O/P EST MOD 30 MIN: CPT | Mod: 95

## 2022-01-28 RX ORDER — ESTRADIOL 0.1 MG/G
0.1 CREAM VAGINAL
Qty: 1 | Refills: 3 | Status: DISCONTINUED | COMMUNITY
Start: 2020-11-09 | End: 2022-01-28

## 2022-01-28 RX ORDER — DIGOXIN 125 UG/1
125 TABLET ORAL DAILY
Qty: 30 | Refills: 3 | Status: ACTIVE | COMMUNITY
Start: 2022-01-28

## 2022-01-28 RX ORDER — SPIRONOLACTONE 25 MG/1
25 TABLET, FILM COATED ORAL
Refills: 0 | Status: ACTIVE | COMMUNITY
Start: 2022-01-28

## 2022-01-28 NOTE — HISTORY OF PRESENT ILLNESS
[Home] : at home, [unfilled] , at the time of the visit. [Medical Office: ([facility])___] : at the medical office located in  [Family Member] : family member [Verbal consent obtained from patient] : the patient, [unfilled] [de-identified] : 89 y/o Female with history of hypertension, CVA no residual deficits , atrial fibrillation not on anticoagulation due to history of ICH in year 2000.Pt admitted to [facility] on 12/08/21 with LE swelling for the last three weeks sec to CHF. course complicated by lethargy/ somnolence post diuresis and needing hydration, arrythmias, A fib RVR TTE - EF 35-45 %,. 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 The LV aneurysm is filled with thrombus measuring 2.8 x 1.6 cm. Onel pl effusion. \par Medically stable discharge 12/15/21 to [facility].\par According to arina pt is feeling much better. Seen by cardiology at [facility]. POor appetite.\par Her weight" 100lbs . She is receiving OT and PT at home.\par

## 2022-01-28 NOTE — ASSESSMENT
[FreeTextEntry1] : 1- Chr A fib s/p RVR- now rate controlled \par on Metoprolol \par no response to Dig loading \par Given LV dysfxn CCB now stopped and instead resumed lopressor at 50mg. \par increased to 100 \par Not on anticoagulation secondary to hx of ICH \par \par #2-Arrythmia- Wide complex non sustained beats over variable time periods, \par multiple episodes and recurrent very frequently on Telemetry- resolved \par STAT EKG- A fib with PVC \par D/W Cards if anti arrhythmics other than BB needs to be considered \par \par # 3AE CHFrEF- resolved \par TTE - EF 35-45 %,. 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 The LV aneurysm is filled with thrombus measuring 2.8 x 1.6 cm. Onel pl effusion \par LE edema significantly improved \par on lasix 20mg prn\par c/w Metoprolol  Spironolactone and Losartan 25 mg po daily \par not on anticoagulation due to history of ICH \par Started aspirin \par Monitor daily weight and I&O \par \par # Leg edema \par improving \par leg elevation \par ACE wraps \par DVT ruled out \par \par # LV thrombus \par Not on anticoagulation secondary to hx of ICH \par \par # HTN, Hypothyroid, \par BP controlled \par BB changed to CCB and Losartan \par \par # Metabolic Alkalosis- now resolved \par sec to diuresis \par \par # Per family poor appetite, requesting appetite stimulants \par start Ensure\par \par # Lethargy, somnolence Dehydration- now resolved \par s/p hydration \par

## 2022-01-28 NOTE — HEALTH RISK ASSESSMENT
[Intercurrent hospitalizations] : was admitted to the hospital  [Never] : Never [de-identified] : 12/08/21-12/15/21

## 2022-02-05 LAB
ALBUMIN SERPL ELPH-MCNC: 4.2 G/DL
ALP BLD-CCNC: 112 U/L
ALT SERPL-CCNC: 10 U/L
ANION GAP SERPL CALC-SCNC: 15 MMOL/L
AST SERPL-CCNC: 8 U/L
BASOPHILS # BLD AUTO: 0.09 K/UL
BASOPHILS NFR BLD AUTO: 1.2 %
BILIRUB SERPL-MCNC: 0.4 MG/DL
BUN SERPL-MCNC: 22 MG/DL
CALCIUM SERPL-MCNC: 9.5 MG/DL
CHLORIDE SERPL-SCNC: 104 MMOL/L
CO2 SERPL-SCNC: 22 MMOL/L
CREAT SERPL-MCNC: 0.83 MG/DL
EOSINOPHIL # BLD AUTO: 0.24 K/UL
EOSINOPHIL NFR BLD AUTO: 3.1 %
FOLATE SERPL-MCNC: >20 NG/ML
GLUCOSE SERPL-MCNC: 90 MG/DL
HCT VFR BLD CALC: 36.2 %
HGB BLD-MCNC: 10.7 G/DL
IMM GRANULOCYTES NFR BLD AUTO: 0.4 %
LYMPHOCYTES # BLD AUTO: 1.59 K/UL
LYMPHOCYTES NFR BLD AUTO: 20.7 %
MAGNESIUM SERPL-MCNC: 2.2 MG/DL
MAN DIFF?: NORMAL
MCHC RBC-ENTMCNC: 26.8 PG
MCHC RBC-ENTMCNC: 29.6 GM/DL
MCV RBC AUTO: 90.7 FL
MONOCYTES # BLD AUTO: 0.67 K/UL
MONOCYTES NFR BLD AUTO: 8.7 %
NEUTROPHILS # BLD AUTO: 5.05 K/UL
NEUTROPHILS NFR BLD AUTO: 65.9 %
PHOSPHATE SERPL-MCNC: 4.2 MG/DL
PLATELET # BLD AUTO: 296 K/UL
POTASSIUM SERPL-SCNC: 4.6 MMOL/L
PROT SERPL-MCNC: 6.9 G/DL
RBC # BLD: 3.99 M/UL
RBC # FLD: 18.4 %
SODIUM SERPL-SCNC: 140 MMOL/L
TSH SERPL-ACNC: 2.55 UIU/ML
VIT B12 SERPL-MCNC: 645 PG/ML
WBC # FLD AUTO: 7.67 K/UL

## 2022-03-10 ENCOUNTER — APPOINTMENT (OUTPATIENT)
Dept: FAMILY MEDICINE | Facility: CLINIC | Age: 87
End: 2022-03-10
Payer: MEDICARE

## 2022-03-10 VITALS
HEIGHT: 60 IN | OXYGEN SATURATION: 96 % | TEMPERATURE: 97.5 F | SYSTOLIC BLOOD PRESSURE: 120 MMHG | DIASTOLIC BLOOD PRESSURE: 80 MMHG | BODY MASS INDEX: 23.16 KG/M2 | HEART RATE: 64 BPM | WEIGHT: 118 LBS | RESPIRATION RATE: 16 BRPM

## 2022-03-10 DIAGNOSIS — M19.90 UNSPECIFIED OSTEOARTHRITIS, UNSPECIFIED SITE: ICD-10-CM

## 2022-03-10 PROCEDURE — 99214 OFFICE O/P EST MOD 30 MIN: CPT

## 2022-03-10 NOTE — ASSESSMENT
[FreeTextEntry1] : 1- Chr A fib s/p RVR- now rate controlled \par on Metoprolol \par no response to Dig loading \par Given LV dysfxn CCB now stopped and instead resumed lopressor at 50mg. \par increased to 100 \par Not on anticoagulation secondary to hx of ICH \par \par #2-Arrythmia- Wide complex non sustained beats over variable time periods, \par multiple episodes and recurrent very frequently on Telemetry- resolved \par STAT EKG- A fib with PVC \par D/W Cards if anti arrhythmics other than BB needs to be considered \par \par # 3AE CHFrEF- resolved \par TTE - EF 35-45 %,. 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 The LV aneurysm is filled with thrombus measuring 2.8 x 1.6 cm. Onel pl effusion \par LE edema significantly improved \par on lasix 20mg prn\par c/w Metoprolol Spironolactone and Losartan 25 mg po daily \par not on anticoagulation due to history of ICH \par Started aspirin \par Monitor daily weight and I&O \par \par # Leg edema \par improving \par leg elevation \par ACE wraps \par DVT ruled out \par \par # LV thrombus \par Not on anticoagulation secondary to hx of ICH \par \par # HTN, Hypothyroid, \par BP controlled \par BB changed to CCB and Losartan \par \par # Metabolic Alkalosis- now resolved \par sec to diuresis \par \par # Per family poor appetite, requesting appetite stimulants \par start Ensure\par \par # Lethargy, somnolence Dehydration- now resolved \par s/p hydration \par \par Insomnia;/ Confusion\par -On trazodone 50mg qhs\par -Start melatonin qhs\par -neurology referral.

## 2022-03-15 ENCOUNTER — APPOINTMENT (OUTPATIENT)
Dept: INTERNAL MEDICINE | Facility: CLINIC | Age: 87
End: 2022-03-15
Payer: MEDICARE

## 2022-03-15 DIAGNOSIS — Z87.898 PERSONAL HISTORY OF OTHER SPECIFIED CONDITIONS: ICD-10-CM

## 2022-03-15 DIAGNOSIS — Z63.4 DISAPPEARANCE AND DEATH OF FAMILY MEMBER: ICD-10-CM

## 2022-03-15 DIAGNOSIS — Z09 ENCOUNTER FOR FOLLOW-UP EXAMINATION AFTER COMPLETED TREATMENT FOR CONDITIONS OTHER THAN MALIGNANT NEOPLASM: ICD-10-CM

## 2022-03-15 DIAGNOSIS — K21.9 GASTRO-ESOPHAGEAL REFLUX DISEASE W/OUT ESOPHAGITIS: ICD-10-CM

## 2022-03-15 DIAGNOSIS — Z87.440 PERSONAL HISTORY OF URINARY (TRACT) INFECTIONS: ICD-10-CM

## 2022-03-15 DIAGNOSIS — M79.672 PAIN IN LEFT FOOT: ICD-10-CM

## 2022-03-15 PROCEDURE — 36415 COLL VENOUS BLD VENIPUNCTURE: CPT

## 2022-03-15 PROCEDURE — 99215 OFFICE O/P EST HI 40 MIN: CPT | Mod: 25

## 2022-03-15 RX ORDER — KRILL/OM-3/DHA/EPA/PHOSPHO/AST 1000-230MG
81 CAPSULE ORAL
Refills: 0 | Status: ACTIVE | COMMUNITY
Start: 2022-01-28

## 2022-03-15 RX ORDER — METOPROLOL SUCCINATE 50 MG/1
50 TABLET, EXTENDED RELEASE ORAL
Qty: 180 | Refills: 1 | Status: ACTIVE | COMMUNITY
Start: 2020-02-28

## 2022-03-15 RX ORDER — ELECTROLYTES/DEXTROSE
SOLUTION, ORAL ORAL
Refills: 0 | Status: ACTIVE | COMMUNITY
Start: 2022-01-28

## 2022-03-15 RX ORDER — FAMOTIDINE 20 MG/1
20 TABLET, FILM COATED ORAL
Qty: 90 | Refills: 0 | Status: ACTIVE | COMMUNITY
Start: 2022-03-15

## 2022-03-15 RX ORDER — TRAZODONE HYDROCHLORIDE 50 MG/1
50 TABLET ORAL
Qty: 30 | Refills: 0 | Status: DISCONTINUED | COMMUNITY
Start: 2022-03-10 | End: 2022-03-15

## 2022-03-15 SDOH — SOCIAL STABILITY - SOCIAL INSECURITY: DISSAPEARANCE AND DEATH OF FAMILY MEMBER: Z63.4

## 2022-03-15 NOTE — ASSESSMENT
[FreeTextEntry1] : -PMH: HTN, H/o CVA (2000), AFib (Not on AC 2/2 h/o ICH), CHFrEF, LV Aneurysm w/ Thrombus, Hypothyroidism, GERD, Recurrent UTI, Sleep Dysfxn, Gait Instability\par -SH: . 3 children. Non-smoker. Occasional EtOH use. \par \par TERESA is a 89 year F whom is here today to establish care w/ a new PMD\par \par Specialists Involved:\par -UroGyn: Dr. Mikey Driscoll\par -Cardio: Dr. Smith ([phone])\par \par -F/u labs drawn in office today\par -Further recs pending lab results\par -Continue f/u w/ Cardio (AFib)\par -RTO 1mo or sooner if needed (MMSE, MOLST)\par \par Greater than 40 min spent reviewing patients chart to establish care as new patient and in office evaluation

## 2022-03-15 NOTE — HISTORY OF PRESENT ILLNESS
[FreeTextEntry1] : establish care [de-identified] : -PMH: HTN, H/o CVA (2000), AFib (Not on AC 2/2 h/o ICH), CHFrEF, LV Aneurysm w/ Thrombus, Hypothyroidism, GERD, Recurrent UTI, Sleep Dysfxn, Gait Instability\par -SH: . 3 children. Non-smoker. Occasional EtOH use. \par \par TERESA is a 89 year F whom is here today to establish care w/ a new PMD\par Today, pt reports feeling well \par She is accompanied by her Grand Daughter today\par AFib, CHF are new Dx. Pt was admitted back in December and then DCd to [facility]\par \par Specialists Involved:\par -UroGyn: Dr. Mikey Driscoll\par -Cardio: Dr. Smith ([phone])\par \par -HTN, H/o CVA (2000), AFib, CHFrEF, LV Aneurysm w/ Thrombus: ASA 81mg only, Not on AC 2/2 h/o ICH. Managed on Aldactone 25mg QD, Furosemide 20mg PRN, Digoxin 125mcg QD, Metoprolol 50mg BID & Losartan 25mg QD. Follows w/ Cardio. \par  \par -Hypothyroidism: Remains on Levothyroxine 50mcg QD. \par -GERD: Remains on Omeprazole 40mg QD & Pepcid 20mg HS\par \par -Memory Difficulties: Started July 2021\par -Gait Instability: Currently receiving Home PT 1x/wk & OT 2x/wk w/ notable improvement. Lives w/ her Grand Daughter in a 3 floor home. Has Shower Bench & Grab Bars. Rarely home alone. Does not have fall alert system. \par -Sleep Dysfunction & Poor Appetite: On Trazodone currently \par -Recurrent UTI: Managed as per Uro

## 2022-03-16 ENCOUNTER — NON-APPOINTMENT (OUTPATIENT)
Age: 87
End: 2022-03-16

## 2022-03-16 DIAGNOSIS — Z86.2 PERSONAL HISTORY OF DISEASES OF THE BLOOD AND BLOOD-FORMING ORGANS AND CERTAIN DISORDERS INVOLVING THE IMMUNE MECHANISM: ICD-10-CM

## 2022-03-16 LAB
ANION GAP SERPL CALC-SCNC: 13 MMOL/L
BASOPHILS # BLD AUTO: 0.12 K/UL
BASOPHILS NFR BLD AUTO: 1.3 %
BUN SERPL-MCNC: 21 MG/DL
CALCIUM SERPL-MCNC: 9.4 MG/DL
CHLORIDE SERPL-SCNC: 104 MMOL/L
CO2 SERPL-SCNC: 23 MMOL/L
CREAT SERPL-MCNC: 0.95 MG/DL
EGFR: 57 ML/MIN/1.73M2
EOSINOPHIL # BLD AUTO: 0.29 K/UL
EOSINOPHIL NFR BLD AUTO: 3.1 %
FERRITIN SERPL-MCNC: 62 NG/ML
GLUCOSE SERPL-MCNC: 114 MG/DL
HCT VFR BLD CALC: 36.8 %
HGB BLD-MCNC: 11.9 G/DL
IMM GRANULOCYTES NFR BLD AUTO: 0.6 %
IRON SATN MFR SERPL: 13 %
IRON SERPL-MCNC: 47 UG/DL
LYMPHOCYTES # BLD AUTO: 1.48 K/UL
LYMPHOCYTES NFR BLD AUTO: 15.7 %
MAN DIFF?: NORMAL
MCHC RBC-ENTMCNC: 29.9 PG
MCHC RBC-ENTMCNC: 32.3 GM/DL
MCV RBC AUTO: 92.5 FL
MONOCYTES # BLD AUTO: 0.58 K/UL
MONOCYTES NFR BLD AUTO: 6.2 %
NEUTROPHILS # BLD AUTO: 6.9 K/UL
NEUTROPHILS NFR BLD AUTO: 73.1 %
PLATELET # BLD AUTO: 319 K/UL
POTASSIUM SERPL-SCNC: 4.2 MMOL/L
RBC # BLD: 3.98 M/UL
RBC # FLD: 17.1 %
SODIUM SERPL-SCNC: 140 MMOL/L
TIBC SERPL-MCNC: 369 UG/DL
TSH SERPL-ACNC: 3.93 UIU/ML
UIBC SERPL-MCNC: 323 UG/DL
WBC # FLD AUTO: 9.43 K/UL

## 2022-03-29 ENCOUNTER — TRANSCRIPTION ENCOUNTER (OUTPATIENT)
Age: 87
End: 2022-03-29

## 2022-04-21 ENCOUNTER — APPOINTMENT (OUTPATIENT)
Dept: INTERNAL MEDICINE | Facility: CLINIC | Age: 87
End: 2022-04-21
Payer: MEDICARE

## 2022-04-21 VITALS
HEART RATE: 55 BPM | SYSTOLIC BLOOD PRESSURE: 116 MMHG | BODY MASS INDEX: 21.99 KG/M2 | DIASTOLIC BLOOD PRESSURE: 74 MMHG | TEMPERATURE: 97.3 F | WEIGHT: 112 LBS | OXYGEN SATURATION: 96 % | HEIGHT: 60 IN | RESPIRATION RATE: 16 BRPM

## 2022-04-21 PROCEDURE — 99214 OFFICE O/P EST MOD 30 MIN: CPT

## 2022-04-21 NOTE — ASSESSMENT
[FreeTextEntry1] : -PMH: HTN, H/o CVA (2000), AFib (Not on AC 2/2 h/o ICH), CHFrEF, LV Aneurysm w/ Thrombus, Hypothyroidism, GERD, Recurrent UTI, Sleep Dysfxn, Gait Instability\par -SH: . 3 children. Non-smoker. Occasional EtOH use. \par \par TERESA is a 89 year F whom is here today for f/u weight loss, HTN, Sleep dysfxn after starting new med, gait instability\par \par Specialists Involved:\par -UroGyn: Dr. Mikey Driscoll\par -Cardio: Dr. Smith ([phone])\par \par -Continue f/u w/ Cardio (AFib)\par -RTO 2mo or sooner if needed (MMSE, MOLST)

## 2022-04-21 NOTE — HISTORY OF PRESENT ILLNESS
[FreeTextEntry1] :  weight loss, HTN, Sleep dysfxn after starting new med, gait instability [de-identified] : -PMH: HTN, H/o CVA (2000), AFib (Not on AC 2/2 h/o ICH), CHFrEF, LV Aneurysm w/ Thrombus, Hypothyroidism, GERD, Recurrent UTI, Sleep Dysfxn, Gait Instability\par -SH: . 3 children. Non-smoker. Occasional EtOH use. \par \par TERESA is a 89 year F whom is here today for f/u weight loss, HTN, Sleep dysfxn after starting new med, gait instability\par Today, pt reports feeling well \par She is accompanied by her Grand Daughter today\par AFib, CHF are new Dx. Pt was admitted back in December and then DCd to [facility]\par \par -HTN, H/o CVA (2000), AFib, CHFrEF, LV Aneurysm w/ Thrombus: ASA 81mg only, Not on AC 2/2 h/o ICH. Managed on Aldactone 25mg QD, Furosemide 20mg PRN, Digoxin 125mcg QD, Metoprolol 50mg BID & Losartan 25mg QD. Follows w/ Cardio. \par  \par -Hypothyroidism: Remains on Levothyroxine 50mcg QD. \par -GERD: Remains on Omeprazole 40mg QD & Pepcid 20mg HS\par \par -Memory Difficulties: Started following recent hospital admission. No progression reported, improvement with associated anxiety improved on Remeron. \par -Sleep Dysfunction & Poor Appetite: Off Trazodone and now on Remeron with improved mood and sleep, but no change in appetite. \par -Gait Instability: Had recent fall this week w/ mild bump on her head. Currently receiving Home PT 1x/wk & OT 2x/wk w/ notable improvement. Lives w/ her Grand Daughter in a 3 floor home. Has Shower Bench & Grab Bars. Rarely home alone. Does not have fall alert system. \par -Recurrent UTI: Managed as per Uro

## 2022-06-08 ENCOUNTER — RX RENEWAL (OUTPATIENT)
Age: 87
End: 2022-06-08

## 2022-06-09 ENCOUNTER — APPOINTMENT (OUTPATIENT)
Dept: FAMILY MEDICINE | Facility: CLINIC | Age: 87
End: 2022-06-09

## 2022-06-24 ENCOUNTER — APPOINTMENT (OUTPATIENT)
Dept: INTERNAL MEDICINE | Facility: CLINIC | Age: 87
End: 2022-06-24
Payer: MEDICARE

## 2022-06-24 VITALS
SYSTOLIC BLOOD PRESSURE: 130 MMHG | DIASTOLIC BLOOD PRESSURE: 80 MMHG | HEIGHT: 60 IN | WEIGHT: 116 LBS | OXYGEN SATURATION: 98 % | HEART RATE: 91 BPM | BODY MASS INDEX: 22.78 KG/M2 | TEMPERATURE: 97.1 F

## 2022-06-24 PROCEDURE — 99214 OFFICE O/P EST MOD 30 MIN: CPT | Mod: 25

## 2022-06-24 PROCEDURE — 36415 COLL VENOUS BLD VENIPUNCTURE: CPT

## 2022-06-24 RX ORDER — METOPROLOL SUCCINATE 100 MG/1
100 TABLET, EXTENDED RELEASE ORAL
Qty: 30 | Refills: 0 | Status: DISCONTINUED | COMMUNITY
Start: 2021-12-30

## 2022-06-24 RX ORDER — DIPHENHYDRAMINE HCL 12.5 MG/5ML
25 MCG LIQUID ORAL
Qty: 30 | Refills: 0 | Status: DISCONTINUED | COMMUNITY
Start: 2021-12-30

## 2022-06-24 RX ORDER — ACETAMINOPHEN/DIPHENHYDRAMINE 500MG-25MG
81 TABLET ORAL
Qty: 30 | Refills: 0 | Status: DISCONTINUED | COMMUNITY
Start: 2021-12-30

## 2022-06-24 NOTE — HISTORY OF PRESENT ILLNESS
[FreeTextEntry1] :  weight loss, HTN, Sleep dysfxn after starting new med, gait instability [de-identified] : -PMH: HTN, H/o CVA (2000), AFib (Not on AC 2/2 h/o ICH), CHFrEF, LV Aneurysm w/ Thrombus, Hypothyroidism, GERD, Recurrent UTI, Sleep Dysfxn, Gait Instability\par -SH: . 3 children. Non-smoker. Occasional EtOH use. \par \par TERESA is a 89 year F whom is here today for f/u weight loss, HTN, Sleep dysfxn, gait instability\par Today, pt reports feeling well \par She is accompanied by her Grand Daughter today\par \par -HTN, H/o CVA (2000), AFib, CHFrEF, LV Aneurysm w/ Thrombus: ASA 81mg only, Not on AC 2/2 h/o ICH. Managed on Aldactone 25mg QD, Furosemide 20mg PRN, Digoxin 125mcg QD, Metoprolol 50mg BID & Losartan 25mg QD. Follows w/ Cardio. \par  \par -Hypothyroidism: Remains on Levothyroxine 50mcg QD. \par -GERD: Remains on Omeprazole 40mg QD & Pepcid 20mg HS\par \par -Memory Difficulties: Started following recent hospital admission. No progression reported, improvement with associated anxiety improved on Remeron. \par -Sleep Dysfunction & Poor Appetite: Sleep & Mood improved on Remeron, but no change in appetite\par \par -Gait Instability: Had recent fall this week w/ mild bump on her head. Currently receiving Home PT 1x/wk & OT 2x/wk w/ notable improvement. Lives w/ her Grand Daughter in a 3 floor home. Has Shower Bench & Grab Bars. Rarely home alone. Does not have fall alert system. \par -Recurrent UTI: Managed as per Uro

## 2022-06-24 NOTE — ASSESSMENT
[FreeTextEntry1] : -PMH: HTN, H/o CVA (2000), AFib (Not on AC 2/2 h/o ICH), CHFrEF, LV Aneurysm w/ Thrombus, Hypothyroidism, GERD, Recurrent UTI, Sleep Dysfxn, Gait Instability\par -SH: . 3 children. Non-smoker. Occasional EtOH use. \par \par TERESA is a 89 year F whom is here today for f/u weight loss, HTN, Sleep dysfxn, gait instability\par \par Specialists Involved:\par -UroGyn: Dr. Mikey Driscoll\par -Cardio: Dr. Smith ([phone])\par \par -F/u labs drawn in office today\par -Further recs pending lab results\par -Continue f/u w/ Cardio (AFib)\par -RTO 2mo or sooner if needed (MMSE, MOLST)

## 2022-06-26 LAB
ANION GAP SERPL CALC-SCNC: 14 MMOL/L
BASOPHILS # BLD AUTO: 0.1 K/UL
BASOPHILS NFR BLD AUTO: 1.2 %
BUN SERPL-MCNC: 25 MG/DL
CALCIUM SERPL-MCNC: 9.7 MG/DL
CHLORIDE SERPL-SCNC: 109 MMOL/L
CO2 SERPL-SCNC: 21 MMOL/L
CREAT SERPL-MCNC: 0.96 MG/DL
EGFR: 57 ML/MIN/1.73M2
EOSINOPHIL # BLD AUTO: 0.41 K/UL
EOSINOPHIL NFR BLD AUTO: 4.8 %
GLUCOSE SERPL-MCNC: 106 MG/DL
HCT VFR BLD CALC: 36.1 %
HGB BLD-MCNC: 11.6 G/DL
IMM GRANULOCYTES NFR BLD AUTO: 0.6 %
LYMPHOCYTES # BLD AUTO: 1.65 K/UL
LYMPHOCYTES NFR BLD AUTO: 19.5 %
MAN DIFF?: NORMAL
MCHC RBC-ENTMCNC: 29.1 PG
MCHC RBC-ENTMCNC: 32.1 GM/DL
MCV RBC AUTO: 90.7 FL
MONOCYTES # BLD AUTO: 0.54 K/UL
MONOCYTES NFR BLD AUTO: 6.4 %
NEUTROPHILS # BLD AUTO: 5.72 K/UL
NEUTROPHILS NFR BLD AUTO: 67.5 %
PLATELET # BLD AUTO: 310 K/UL
POTASSIUM SERPL-SCNC: 4.7 MMOL/L
RBC # BLD: 3.98 M/UL
RBC # FLD: 14.2 %
SODIUM SERPL-SCNC: 144 MMOL/L
TSH SERPL-ACNC: 3.68 UIU/ML
WBC # FLD AUTO: 8.47 K/UL

## 2022-07-25 ENCOUNTER — RX RENEWAL (OUTPATIENT)
Age: 87
End: 2022-07-25

## 2022-09-09 ENCOUNTER — APPOINTMENT (OUTPATIENT)
Dept: INTERNAL MEDICINE | Facility: CLINIC | Age: 87
End: 2022-09-09

## 2022-09-09 ENCOUNTER — RX RENEWAL (OUTPATIENT)
Age: 87
End: 2022-09-09

## 2022-09-09 VITALS
HEART RATE: 65 BPM | TEMPERATURE: 97.2 F | DIASTOLIC BLOOD PRESSURE: 80 MMHG | OXYGEN SATURATION: 98 % | HEIGHT: 60 IN | BODY MASS INDEX: 22.58 KG/M2 | SYSTOLIC BLOOD PRESSURE: 122 MMHG | WEIGHT: 115 LBS

## 2022-09-09 DIAGNOSIS — Z00.00 ENCOUNTER FOR GENERAL ADULT MEDICAL EXAMINATION W/OUT ABNORMAL FINDINGS: ICD-10-CM

## 2022-09-09 DIAGNOSIS — G47.00 INSOMNIA, UNSPECIFIED: ICD-10-CM

## 2022-09-09 PROCEDURE — 99215 OFFICE O/P EST HI 40 MIN: CPT

## 2022-09-09 NOTE — ASSESSMENT
[FreeTextEntry1] : -PMH: HTN, H/o CVA (2000), AFib (Not on AC 2/2 h/o ICH), CHFrEF, LV Aneurysm w/ Thrombus, Hypothyroidism, GERD, Recurrent UTI, Sleep Dysfxn, Gait Instability\par -SH: . 3 children. Non-smoker. Occasional EtOH use. \par \par TERESA is a 89 year F whom is here today for f/u weight loss, HTN, Sleep dysfxn, gait instability\par \par Specialists Involved:\par -UroGyn: Dr. Mikey Driscoll\par -Cardio: Dr. Smith ([phone])\par \par -Continue f/u w/ Cardio (AFib)\par -RTO 6mo for CPE or sooner if needed (MMSE, MOLST)

## 2022-09-09 NOTE — HISTORY OF PRESENT ILLNESS
[FreeTextEntry1] :  weight loss, HTN, Sleep dysfxn after starting new med, gait instability [de-identified] : -PMH: HTN, H/o CVA (2000), AFib (Not on AC 2/2 h/o ICH), CHFrEF, LV Aneurysm w/ Thrombus, Hypothyroidism, GERD, Recurrent UTI, Sleep Dysfxn, Gait Instability\par -SH: . 3 children. Non-smoker. Occasional EtOH use. \par \par TERESA is a 89 year F whom is here today for f/u weight loss, HTN, Sleep dysfxn, gait instability\par Today, pt reports feeling well \par She is accompanied by her Grand Daughter today\par \par -HTN, H/o CVA (2000), AFib, CHFrEF, LV Aneurysm w/ Thrombus: ASA 81mg only, Not on AC 2/2 h/o ICH. Managed on Aldactone 25mg QD, Furosemide 20mg PRN, Digoxin 125mcg QD, Metoprolol 50mg BID & Losartan 25mg QD. Follows w/ Cardio. \par  \par -Hypothyroidism: Remains on Levothyroxine 50mcg QD. \par -GERD: Remains on Omeprazole 40mg QD & Pepcid 20mg HS \par \par -Memory Difficulties: Started following recent hospital admission. No progression reported, improvement with associated anxiety improved on Remeron. \par -Sleep Dysfunction & Poor Appetite: Sleep & Mood improved on Remeron, but no change in appetite\par \par -Gait Instability: Had recent fall this week w/ mild bump on her head. Currently receiving Home PT 1x/wk & OT 2x/wk w/ notable improvement. Lives w/ her Grand Daughter in a 3 floor home. Has Shower Bench & Grab Bars. Rarely home alone. Does not have fall alert system. \par -Recurrent UTI: Managed as per Uro

## 2022-10-28 ENCOUNTER — NON-APPOINTMENT (OUTPATIENT)
Age: 87
End: 2022-10-28

## 2023-01-25 ENCOUNTER — LABORATORY RESULT (OUTPATIENT)
Age: 88
End: 2023-01-25

## 2023-01-25 ENCOUNTER — APPOINTMENT (OUTPATIENT)
Dept: INTERNAL MEDICINE | Facility: CLINIC | Age: 88
End: 2023-01-25
Payer: MEDICARE

## 2023-01-25 VITALS
TEMPERATURE: 97.3 F | HEART RATE: 76 BPM | BODY MASS INDEX: 23.16 KG/M2 | OXYGEN SATURATION: 97 % | DIASTOLIC BLOOD PRESSURE: 80 MMHG | HEIGHT: 60 IN | WEIGHT: 118 LBS | SYSTOLIC BLOOD PRESSURE: 110 MMHG

## 2023-01-25 DIAGNOSIS — M25.511 PAIN IN RIGHT SHOULDER: ICD-10-CM

## 2023-01-25 DIAGNOSIS — S71.109A UNSPECIFIED OPEN WOUND, UNSPECIFIED THIGH, INITIAL ENCOUNTER: ICD-10-CM

## 2023-01-25 DIAGNOSIS — I73.9 PERIPHERAL VASCULAR DISEASE, UNSPECIFIED: ICD-10-CM

## 2023-01-25 PROCEDURE — 36415 COLL VENOUS BLD VENIPUNCTURE: CPT

## 2023-01-25 PROCEDURE — 99214 OFFICE O/P EST MOD 30 MIN: CPT | Mod: 25

## 2023-01-25 RX ORDER — CEPHALEXIN 500 MG/1
500 CAPSULE ORAL 3 TIMES DAILY
Qty: 21 | Refills: 0 | Status: DISCONTINUED | COMMUNITY
Start: 2022-09-09 | End: 2023-01-25

## 2023-01-25 RX ORDER — NUT.TX.IMPAIRED DIGESTIVE FXN 0.035-1/ML
LIQUID (ML) ORAL
Qty: 1 | Refills: 0 | Status: ACTIVE | COMMUNITY
Start: 2022-01-28 | End: 1900-01-01

## 2023-01-25 NOTE — ASSESSMENT
[FreeTextEntry1] : -PMH: HTN, H/o CVA (2000), AFib (Not on AC 2/2 h/o ICH), CHFrEF, LV Aneurysm w/ Thrombus, Hypothyroidism, GERD, Recurrent UTI, Sleep Dysfxn, Gait Instability\par -SH: . 3 children. Non-smoker. Occasional EtOH use. \par \par TERESA is a 89 year F whom is here today for f/u hypothyroidism, HTN, HLD, AFib, Gait Instability, weight loss\par \par Specialists Involved:\par -UroGyn: Dr. Mikey Driscoll\par -Cardio: Dr. Smith ([phone])\par \par -F/u labs drawn in office today\par -Further recs pending lab results\par -Continue f/u w/ Cardio (AFib, CHF, LV Aneurysm w/ Thrombus)\par -RTO 6mo for CPE or sooner if needed (MMSE, MOLST)

## 2023-01-25 NOTE — HISTORY OF PRESENT ILLNESS
[FreeTextEntry1] :  weight loss, HTN, Sleep dysfxn after starting new med, gait instability [de-identified] : -PMH: HTN, H/o CVA (2000), AFib (Not on AC 2/2 h/o ICH), CHFrEF, LV Aneurysm w/ Thrombus, Hypothyroidism, GERD, Recurrent UTI, Sleep Dysfxn, Gait Instability\par -SH: . 3 children. Non-smoker. Occasional EtOH use. \par \par TERESA is a 89 year F whom is here today for f/u hypothyroidism, HTN, HLD, AFib, Gait Instability, weight loss\par Today, pt reports feeling well \par She is accompanied by her Grand Daughter today\par \par -HTN, H/o CVA (2000), AFib, CHFrEF, LV Aneurysm w/ Thrombus: ASA 81mg only, Not on AC 2/2 h/o ICH. Managed on Aldactone 25mg QD, Furosemide 20mg PRN, Digoxin 125mcg QD, Metoprolol 50mg BID & Losartan 25mg QD. Follows w/ Cardio. \par  \par -Hypothyroidism: Remains on Levothyroxine 50mcg QD. \par -GERD: Remains on Pepcid 20mg HS. DOing well of Omeprazole\par \par -Memory Difficulties: Started following recent hospital admission. No progression reported, improvement with associated anxiety improved on Remeron. \par -Sleep Dysfunction & Poor Appetite: Sleep & Mood improved on Remeron\par \par -Gait Instability: Denies recent falls. Completed Home PT/OT. Lives w/ her Grand Daughter in a 3 floor home. Has Shower Bench & Grab Bars. Rarely home alone. Does not have fall alert system. \par -Recurrent UTI: Managed as per Uro

## 2023-01-27 ENCOUNTER — NON-APPOINTMENT (OUTPATIENT)
Age: 88
End: 2023-01-27

## 2023-01-27 LAB
ALBUMIN SERPL ELPH-MCNC: 4.6 G/DL
ALP BLD-CCNC: 118 U/L
ALT SERPL-CCNC: 14 U/L
ANION GAP SERPL CALC-SCNC: 13 MMOL/L
AST SERPL-CCNC: 14 U/L
BILIRUB SERPL-MCNC: 0.3 MG/DL
BUN SERPL-MCNC: 35 MG/DL
CALCIUM SERPL-MCNC: 10.1 MG/DL
CHLORIDE SERPL-SCNC: 104 MMOL/L
CO2 SERPL-SCNC: 24 MMOL/L
CREAT SERPL-MCNC: 1.15 MG/DL
EGFR: 45 ML/MIN/1.73M2
GLUCOSE SERPL-MCNC: 92 MG/DL
POTASSIUM SERPL-SCNC: 4.9 MMOL/L
PROT SERPL-MCNC: 7.6 G/DL
SODIUM SERPL-SCNC: 141 MMOL/L
TSH SERPL-ACNC: 4.7 UIU/ML

## 2023-02-17 ENCOUNTER — NON-APPOINTMENT (OUTPATIENT)
Age: 88
End: 2023-02-17

## 2023-02-17 LAB
ANION GAP SERPL CALC-SCNC: 11 MMOL/L
BUN SERPL-MCNC: 30 MG/DL
CALCIUM SERPL-MCNC: 9.8 MG/DL
CHLORIDE SERPL-SCNC: 107 MMOL/L
CO2 SERPL-SCNC: 23 MMOL/L
CREAT SERPL-MCNC: 1.04 MG/DL
EGFR: 51 ML/MIN/1.73M2
GLUCOSE SERPL-MCNC: 83 MG/DL
POTASSIUM SERPL-SCNC: 6.4 MMOL/L
SODIUM SERPL-SCNC: 141 MMOL/L

## 2023-02-18 NOTE — HISTORY OF PRESENT ILLNESS
[FreeTextEntry8] : Patient presents with urinary frequency associated with dark colored urine and dysuria for 4 days. She also complains pain in abdomen and chest when urinating that resolves upon completion She had this pain in the past with UTIs. She denies fever, nausea and vomiting.  These symptoms are similar to her previous urinary tract infections. This is her 4th UTI in the past 2 months. She plans to follow up with urogynecology on 8/15.
Opt out

## 2023-02-20 DIAGNOSIS — Z86.39 PERSONAL HISTORY OF OTHER ENDOCRINE, NUTRITIONAL AND METABOLIC DISEASE: ICD-10-CM

## 2023-02-20 DIAGNOSIS — N17.9 ACUTE KIDNEY FAILURE, UNSPECIFIED: ICD-10-CM

## 2023-02-20 LAB
ANION GAP SERPL CALC-SCNC: 14 MMOL/L
BUN SERPL-MCNC: 31 MG/DL
CALCIUM SERPL-MCNC: 9.6 MG/DL
CHLORIDE SERPL-SCNC: 104 MMOL/L
CO2 SERPL-SCNC: 22 MMOL/L
CREAT SERPL-MCNC: 1.1 MG/DL
EGFR: 48 ML/MIN/1.73M2
GLUCOSE SERPL-MCNC: 116 MG/DL
POTASSIUM SERPL-SCNC: 4.8 MMOL/L
SODIUM SERPL-SCNC: 139 MMOL/L

## 2023-02-23 ENCOUNTER — NON-APPOINTMENT (OUTPATIENT)
Age: 88
End: 2023-02-23

## 2023-05-24 ENCOUNTER — APPOINTMENT (OUTPATIENT)
Dept: INTERNAL MEDICINE | Facility: CLINIC | Age: 88
End: 2023-05-24
Payer: MEDICARE

## 2023-05-24 VITALS
OXYGEN SATURATION: 97 % | SYSTOLIC BLOOD PRESSURE: 130 MMHG | HEIGHT: 60 IN | WEIGHT: 125 LBS | DIASTOLIC BLOOD PRESSURE: 78 MMHG | BODY MASS INDEX: 24.54 KG/M2 | TEMPERATURE: 97.3 F | HEART RATE: 76 BPM | RESPIRATION RATE: 16 BRPM

## 2023-05-24 DIAGNOSIS — E55.9 VITAMIN D DEFICIENCY, UNSPECIFIED: ICD-10-CM

## 2023-05-24 DIAGNOSIS — Z79.899 ENCOUNTER FOR THERAPEUTIC DRUG LVL MONITORING: ICD-10-CM

## 2023-05-24 DIAGNOSIS — Z51.81 ENCOUNTER FOR THERAPEUTIC DRUG LVL MONITORING: ICD-10-CM

## 2023-05-24 PROCEDURE — 36415 COLL VENOUS BLD VENIPUNCTURE: CPT

## 2023-05-24 PROCEDURE — G0439: CPT

## 2023-05-24 NOTE — HEALTH RISK ASSESSMENT
[HIV test declined] : HIV test declined [Hepatitis C test declined] : Hepatitis C test declined [] :  [# Of Children ___] : has [unfilled] children [Reviewed no changes] : Reviewed, no changes [Never] : Never [Very Good] : ~his/her~  mood as very good [No] : In the past 12 months have you used drugs other than those required for medical reasons? No [No falls in past year] : Patient reported no falls in the past year [0] : 2) Feeling down, depressed, or hopeless: Not at all (0) [PHQ-2 Negative - No further assessment needed] : PHQ-2 Negative - No further assessment needed [[facility]] : 10 [[id number]] : 0 [Change in mental status noted] : No change in mental status noted [None] : None [With Family] : lives with family [Fully functional (bathing, dressing, toileting, transferring, walking, feeding)] : Fully functional (bathing, dressing, toileting, transferring, walking, feeding) [Independent] : doing laundry [Some assistance needed] : using transportation [Full assistance needed] : managing finances [AdvancecareDate] : 05/23

## 2023-05-24 NOTE — ASSESSMENT
[FreeTextEntry1] : -PMH: HTN, H/o CVA (2000), AFib (Not on AC 2/2 h/o ICH), CHFrEF, LV Aneurysm w/ Thrombus, Hypothyroidism, GERD, Recurrent UTI, Sleep Dysfxn, Gait Instability\par -SH: . 3 children. Non-smoker. Occasional EtOH use. \par \par TERESA is a 89 year F whom is here today for an annual well check \par \par Specialists Involved:\par -UroGyn: Dr. Mikey Driscoll\par -Cardio: Dr. Smith ([phone])\par \par -F/u labs drawn in office today\par -Further recs pending lab results\par -Continue f/u w/ Cardio (AFib, CHF, LV Aneurysm w/ Thrombus)\par -RTO 4-6mo or sooner if needed

## 2023-05-24 NOTE — HISTORY OF PRESENT ILLNESS
[FreeTextEntry1] : annual well check  [de-identified] : -PMH: HTN, H/o CVA (2000), AFib (Not on AC 2/2 h/o ICH), CHFrEF, LV Aneurysm w/ Thrombus, Hypothyroidism, GERD, Recurrent UTI, Sleep Dysfxn, Gait Instability\par -SH: . 3 children. Non-smoker. Occasional EtOH use. \par \par TERESA is a 89 year F whom is here today for an annual well check \par She is accompanied by her Grand Daughter today\par \par Specialists Involved:\par -UroGyn: Dr. Mikey Driscoll\par -Cardio: Dr. Smith ([phone])\par \par -Vaccines: Needs Shingles, TDap, COVID  (Check [facility] Vaccine Registry)\par -DEXA: Script given today\par -FH of Colon, breast or ovarian CA: None known\par \par -HTN, H/o CVA (2000), AFib, CHFrEF (9/2022) ECHO: EF 45%., LV Aneurysm w/ Thrombus (Resolved on ECHO 9/2022): ASA 81mg only, Not on AC 2/2 h/o ICH. Managed on Aldactone 25mg QD, Furosemide 20mg PRN, Digoxin 125mcg QD, Metoprolol 50mg BID & Losartan 25mg QD. Follows w/ Cardio. \par  \par -Hypothyroidism: Remains on Levothyroxine 50mcg QD. \par -GERD: Remains on Pepcid 20mg HS. DOing well of Omeprazole\par \par -Memory Difficulties: Started following recent hospital admission. No progression reported, improvement with associated anxiety improved on Remeron. \par -Sleep Dysfunction & Poor Appetite: Sleep & Mood improved on Remeron\par \par -Gait Instability: Denies recent falls. Completed Home PT/OT. Lives w/ her Grand Daughter in a 3 floor home. Has Shower Bench & Grab Bars. Rarely home alone. Does not have fall alert system. \par -Recurrent UTI: Managed as per Uro

## 2023-05-25 LAB
25(OH)D3 SERPL-MCNC: 53.4 NG/ML
ALBUMIN SERPL ELPH-MCNC: 4.3 G/DL
ALP BLD-CCNC: 116 U/L
ALT SERPL-CCNC: 13 U/L
ANION GAP SERPL CALC-SCNC: 16 MMOL/L
AST SERPL-CCNC: 9 U/L
BILIRUB SERPL-MCNC: 0.5 MG/DL
BUN SERPL-MCNC: 30 MG/DL
CALCIUM SERPL-MCNC: 9.9 MG/DL
CHLORIDE SERPL-SCNC: 103 MMOL/L
CHOLEST SERPL-MCNC: 159 MG/DL
CO2 SERPL-SCNC: 23 MMOL/L
CREAT SERPL-MCNC: 1.16 MG/DL
DIGOXIN SERPL-MCNC: 1.3 NG/ML
EGFR: 45 ML/MIN/1.73M2
GLUCOSE SERPL-MCNC: 131 MG/DL
HDLC SERPL-MCNC: 46 MG/DL
LDLC SERPL CALC-MCNC: 86 MG/DL
MAGNESIUM SERPL-MCNC: 2.4 MG/DL
NONHDLC SERPL-MCNC: 113 MG/DL
POTASSIUM SERPL-SCNC: 4.7 MMOL/L
PROT SERPL-MCNC: 6.9 G/DL
SODIUM SERPL-SCNC: 141 MMOL/L
TRIGL SERPL-MCNC: 138 MG/DL
TSH SERPL-ACNC: 2.75 UIU/ML

## 2023-06-01 RX ORDER — LOSARTAN POTASSIUM 25 MG/1
25 TABLET, FILM COATED ORAL DAILY
Qty: 90 | Refills: 0 | Status: ACTIVE | COMMUNITY
Start: 2022-01-28 | End: 1900-01-01

## 2023-09-06 ENCOUNTER — RX RENEWAL (OUTPATIENT)
Age: 88
End: 2023-09-06

## 2023-09-09 ENCOUNTER — EMERGENCY (EMERGENCY)
Facility: HOSPITAL | Age: 88
LOS: 1 days | Discharge: HOME | End: 2023-09-09
Attending: EMERGENCY MEDICINE
Payer: MEDICARE

## 2023-09-09 VITALS
SYSTOLIC BLOOD PRESSURE: 136 MMHG | OXYGEN SATURATION: 98 % | HEART RATE: 83 BPM | RESPIRATION RATE: 20 BRPM | WEIGHT: 175.05 LBS | TEMPERATURE: 98 F | DIASTOLIC BLOOD PRESSURE: 68 MMHG | HEIGHT: 61 IN

## 2023-09-09 VITALS
DIASTOLIC BLOOD PRESSURE: 96 MMHG | TEMPERATURE: 99 F | RESPIRATION RATE: 18 BRPM | HEART RATE: 88 BPM | OXYGEN SATURATION: 94 % | SYSTOLIC BLOOD PRESSURE: 152 MMHG

## 2023-09-09 DIAGNOSIS — Z98.890 OTHER SPECIFIED POSTPROCEDURAL STATES: Chronic | ICD-10-CM

## 2023-09-09 DIAGNOSIS — Z98.891 HISTORY OF UTERINE SCAR FROM PREVIOUS SURGERY: Chronic | ICD-10-CM

## 2023-09-09 LAB
ALBUMIN SERPL ELPH-MCNC: 4 G/DL — SIGNIFICANT CHANGE UP (ref 3.3–5.2)
ALP SERPL-CCNC: 111 U/L — SIGNIFICANT CHANGE UP (ref 40–120)
ALT FLD-CCNC: 12 U/L — SIGNIFICANT CHANGE UP
ANION GAP SERPL CALC-SCNC: 14 MMOL/L — SIGNIFICANT CHANGE UP (ref 5–17)
APPEARANCE UR: ABNORMAL
AST SERPL-CCNC: 15 U/L — SIGNIFICANT CHANGE UP
BACTERIA # UR AUTO: ABNORMAL
BASOPHILS # BLD AUTO: 0.05 K/UL — SIGNIFICANT CHANGE UP (ref 0–0.2)
BASOPHILS NFR BLD AUTO: 0.4 % — SIGNIFICANT CHANGE UP (ref 0–2)
BILIRUB SERPL-MCNC: 0.7 MG/DL — SIGNIFICANT CHANGE UP (ref 0.4–2)
BILIRUB UR-MCNC: NEGATIVE — SIGNIFICANT CHANGE UP
BUN SERPL-MCNC: 20.5 MG/DL — HIGH (ref 8–20)
CALCIUM SERPL-MCNC: 9.2 MG/DL — SIGNIFICANT CHANGE UP (ref 8.4–10.5)
CHLORIDE SERPL-SCNC: 99 MMOL/L — SIGNIFICANT CHANGE UP (ref 96–108)
CO2 SERPL-SCNC: 20 MMOL/L — LOW (ref 22–29)
COLOR SPEC: YELLOW — SIGNIFICANT CHANGE UP
CREAT SERPL-MCNC: 1.15 MG/DL — SIGNIFICANT CHANGE UP (ref 0.5–1.3)
DIFF PNL FLD: ABNORMAL
EGFR: 45 ML/MIN/1.73M2 — LOW
EOSINOPHIL # BLD AUTO: 0.01 K/UL — SIGNIFICANT CHANGE UP (ref 0–0.5)
EOSINOPHIL NFR BLD AUTO: 0.1 % — SIGNIFICANT CHANGE UP (ref 0–6)
EPI CELLS # UR: SIGNIFICANT CHANGE UP
GLUCOSE SERPL-MCNC: 108 MG/DL — HIGH (ref 70–99)
GLUCOSE UR QL: NEGATIVE — SIGNIFICANT CHANGE UP
HCT VFR BLD CALC: 39.7 % — SIGNIFICANT CHANGE UP (ref 34.5–45)
HGB BLD-MCNC: 12.8 G/DL — SIGNIFICANT CHANGE UP (ref 11.5–15.5)
IMM GRANULOCYTES NFR BLD AUTO: 0.6 % — SIGNIFICANT CHANGE UP (ref 0–0.9)
KETONES UR-MCNC: NEGATIVE — SIGNIFICANT CHANGE UP
LEUKOCYTE ESTERASE UR-ACNC: ABNORMAL
LYMPHOCYTES # BLD AUTO: 1.05 K/UL — SIGNIFICANT CHANGE UP (ref 1–3.3)
LYMPHOCYTES # BLD AUTO: 7.4 % — LOW (ref 13–44)
MCHC RBC-ENTMCNC: 28.7 PG — SIGNIFICANT CHANGE UP (ref 27–34)
MCHC RBC-ENTMCNC: 32.2 GM/DL — SIGNIFICANT CHANGE UP (ref 32–36)
MCV RBC AUTO: 89 FL — SIGNIFICANT CHANGE UP (ref 80–100)
MONOCYTES # BLD AUTO: 0.7 K/UL — SIGNIFICANT CHANGE UP (ref 0–0.9)
MONOCYTES NFR BLD AUTO: 4.9 % — SIGNIFICANT CHANGE UP (ref 2–14)
NEUTROPHILS # BLD AUTO: 12.26 K/UL — HIGH (ref 1.8–7.4)
NEUTROPHILS NFR BLD AUTO: 86.6 % — HIGH (ref 43–77)
NITRITE UR-MCNC: POSITIVE
PH UR: 5 — SIGNIFICANT CHANGE UP (ref 5–8)
PLATELET # BLD AUTO: 219 K/UL — SIGNIFICANT CHANGE UP (ref 150–400)
POTASSIUM SERPL-MCNC: 4.8 MMOL/L — SIGNIFICANT CHANGE UP (ref 3.5–5.3)
POTASSIUM SERPL-SCNC: 4.8 MMOL/L — SIGNIFICANT CHANGE UP (ref 3.5–5.3)
PROT SERPL-MCNC: 7.3 G/DL — SIGNIFICANT CHANGE UP (ref 6.6–8.7)
PROT UR-MCNC: 15
RBC # BLD: 4.46 M/UL — SIGNIFICANT CHANGE UP (ref 3.8–5.2)
RBC # FLD: 13 % — SIGNIFICANT CHANGE UP (ref 10.3–14.5)
RBC CASTS # UR COMP ASSIST: ABNORMAL /HPF (ref 0–4)
SODIUM SERPL-SCNC: 133 MMOL/L — LOW (ref 135–145)
SP GR SPEC: 1.01 — SIGNIFICANT CHANGE UP (ref 1.01–1.02)
TROPONIN T SERPL-MCNC: <0.01 NG/ML — SIGNIFICANT CHANGE UP (ref 0–0.06)
TSH SERPL-MCNC: 1.04 UIU/ML — SIGNIFICANT CHANGE UP (ref 0.27–4.2)
UROBILINOGEN FLD QL: NEGATIVE — SIGNIFICANT CHANGE UP
WBC # BLD: 14.15 K/UL — HIGH (ref 3.8–10.5)
WBC # FLD AUTO: 14.15 K/UL — HIGH (ref 3.8–10.5)
WBC UR QL: >50 /HPF (ref 0–5)

## 2023-09-09 PROCEDURE — G1004: CPT

## 2023-09-09 PROCEDURE — 87186 SC STD MICRODIL/AGAR DIL: CPT

## 2023-09-09 PROCEDURE — 71045 X-RAY EXAM CHEST 1 VIEW: CPT | Mod: 26

## 2023-09-09 PROCEDURE — 93010 ELECTROCARDIOGRAM REPORT: CPT

## 2023-09-09 PROCEDURE — 99285 EMERGENCY DEPT VISIT HI MDM: CPT | Mod: 25

## 2023-09-09 PROCEDURE — 81001 URINALYSIS AUTO W/SCOPE: CPT

## 2023-09-09 PROCEDURE — 96374 THER/PROPH/DIAG INJ IV PUSH: CPT

## 2023-09-09 PROCEDURE — 85025 COMPLETE CBC W/AUTO DIFF WBC: CPT

## 2023-09-09 PROCEDURE — 70450 CT HEAD/BRAIN W/O DYE: CPT | Mod: 26,MG

## 2023-09-09 PROCEDURE — 84484 ASSAY OF TROPONIN QUANT: CPT

## 2023-09-09 PROCEDURE — 70450 CT HEAD/BRAIN W/O DYE: CPT | Mod: MG

## 2023-09-09 PROCEDURE — 93005 ELECTROCARDIOGRAM TRACING: CPT

## 2023-09-09 PROCEDURE — 36415 COLL VENOUS BLD VENIPUNCTURE: CPT

## 2023-09-09 PROCEDURE — 99285 EMERGENCY DEPT VISIT HI MDM: CPT

## 2023-09-09 PROCEDURE — 87086 URINE CULTURE/COLONY COUNT: CPT

## 2023-09-09 PROCEDURE — 71045 X-RAY EXAM CHEST 1 VIEW: CPT

## 2023-09-09 PROCEDURE — 84443 ASSAY THYROID STIM HORMONE: CPT

## 2023-09-09 PROCEDURE — 80053 COMPREHEN METABOLIC PANEL: CPT

## 2023-09-09 RX ORDER — CEFTRIAXONE 500 MG/1
1000 INJECTION, POWDER, FOR SOLUTION INTRAMUSCULAR; INTRAVENOUS ONCE
Refills: 0 | Status: COMPLETED | OUTPATIENT
Start: 2023-09-09 | End: 2023-09-09

## 2023-09-09 RX ORDER — CEPHALEXIN 500 MG
1 CAPSULE ORAL
Qty: 20 | Refills: 0
Start: 2023-09-09 | End: 2023-09-13

## 2023-09-09 RX ORDER — CEFTRIAXONE 500 MG/1
1000 INJECTION, POWDER, FOR SOLUTION INTRAMUSCULAR; INTRAVENOUS ONCE
Refills: 0 | Status: DISCONTINUED | OUTPATIENT
Start: 2023-09-09 | End: 2023-09-09

## 2023-09-09 RX ORDER — SODIUM CHLORIDE 9 MG/ML
500 INJECTION INTRAMUSCULAR; INTRAVENOUS; SUBCUTANEOUS ONCE
Refills: 0 | Status: COMPLETED | OUTPATIENT
Start: 2023-09-09 | End: 2023-09-09

## 2023-09-09 RX ADMIN — SODIUM CHLORIDE 1000 MILLILITER(S): 9 INJECTION INTRAMUSCULAR; INTRAVENOUS; SUBCUTANEOUS at 15:41

## 2023-09-09 RX ADMIN — CEFTRIAXONE 1000 MILLIGRAM(S): 500 INJECTION, POWDER, FOR SOLUTION INTRAMUSCULAR; INTRAVENOUS at 21:20

## 2023-09-09 NOTE — ED PROVIDER NOTE - WR ORDER ID 1
[id number] Tranexamic Acid Pregnancy And Lactation Text: It is unknown if this medication is safe during pregnancy or breast feeding.

## 2023-09-09 NOTE — ED PROVIDER NOTE - CLINICAL SUMMARY MEDICAL DECISION MAKING FREE TEXT BOX
Patient presenting after she has had a decline over the past 2 days, had a fall yesterday however has been ambulatory since.  She has not been eating as much and now is requiring more assistance acutely for showering and daily activities.  Will obtain laboratory studies including urinalysis, rule out infection and CT head rule out intracranial injury. Patient presenting after she has had a decline over the past 2 days, had a fall yesterday however has been ambulatory since.  She has not been eating as much and now is requiring more assistance acutely for showering and daily activities.  Will obtain laboratory studies including urinalysis, rule out infection and CT head rule out intracranial injury.    Twelve-lead EKG reviewed showing evidence of atrial fibrillation, rate of 80, normal axis, no signs of acute ischemia. Similar changes in lead III noted on ekg two years prior.    Laboratory studies reviewed, mild hyponatremia, leukocytosis of 14, otherwise grossly unremarkable, she was treated with IV fluids and had improvement in her clinical status.  CT head reviewed showing no evidence of acute intracranial injury.  UA came back positive for urinary tract infection.  Culture sent.  Patient treated with ceftriaxone.  She was discharged on 5 days of Keflex 250 mg 4 times daily.  This plan of care was discussed with granddaughter at bedside who is agreeable and willing to observe her at home. Patient presenting after she has had a decline over the past 2 days, had a fall yesterday however has been ambulatory since.  She has not been eating as much and now is requiring more assistance acutely for showering and daily activities.  Will obtain laboratory studies including urinalysis, rule out infection and CT head rule out intracranial injury.    Twelve-lead EKG reviewed showing evidence of atrial fibrillation, rate of 80, normal axis, no signs of acute ischemia. Similar changes in lead III noted on ekg two years prior.    Laboratory studies reviewed, mild hyponatremia, leukocytosis of 14, otherwise grossly unremarkable, she was treated with IV fluids and had improvement in her clinical status.  CT head reviewed showing no evidence of acute intracranial injury.  UA came back positive for urinary tract infection.  Culture sent.  Patient treated with ceftriaxone.  She was discharged on 5 days of Keflex 250 mg 4 times daily.  This plan of care was discussed with granddaughter at bedside who is agreeable and willing to observe her at home. PMD f/u in 3 days.

## 2023-09-09 NOTE — ED PROVIDER NOTE - OBJECTIVE STATEMENT
89 female history of heart failure, atrial fibrillation not on anticoagulation, CVA, hypertension, hypothyroidism presenting with mental decline over the past few days, was found on the floor last night, not prolonged downtime, decreased p.o. intake and generalized fatigue, no fevers or chills.  Family states that last time she presented like this was consistent with UTI.  She lives at home with family under 24-hour supervision.  Normally ambulatory without assistance.  Of note patient was ambulatory after the fall.

## 2023-09-09 NOTE — ED ADULT TRIAGE NOTE - RESPIRATORY RATE (BREATHS/MIN)
Make sure to take your insulin and follow-up with your pediatrician. Return for any new or worsening symptoms. 20

## 2023-09-09 NOTE — ED PROVIDER NOTE - PATIENT PORTAL LINK FT
You can access the FollowMyHealth Patient Portal offered by [facility] by registering at the following website: [email or web address]. By joining [employer]’s FollowMyHealth portal, you will also be able to view your health information using other applications (apps) compatible with our system.

## 2023-09-09 NOTE — ED ADULT TRIAGE NOTE - CHIEF COMPLAINT QUOTE
Pt  brought  in by family c/o  worsening AMS since Thursday . A sper family pt more spaced out and weak .  Hx of dementia.

## 2023-09-09 NOTE — ED PROVIDER NOTE - NS ED ROS FT
Constitutional: generalized weakness with confusion  HEENT: no visual changes, no sore throat, no rhinorrhea  CV: no cp  Resp: no sob  GI: no abd pain, n/v, diarrhea/constipation  : no dysuria, hematuria  MSK: no joint pains  skin: no rashes  neuro: no HA but confusion  psych: no SI/HI

## 2023-09-09 NOTE — ED PROVIDER NOTE - PHYSICAL EXAMINATION
Vitals: WNL  Gen: laying comfortably in NAD   Head: NCAT    ENT: EOMI. No exudates  CV: RRR. Audible S1 and S2. No murmurs. 2+ radial and DP pulses   Pulm: Clear to auscultation bilaterally. No wheezes, rales, or rhonchi  Abd: soft, NTND, no rebound, no guarding  Musculoskeletal:  No peripheral edema, no calf ttp  Neurologic: awake and alert, no focal deficits  : no CVA tenderness

## 2023-09-17 ENCOUNTER — RX RENEWAL (OUTPATIENT)
Age: 88
End: 2023-09-17

## 2023-09-20 ENCOUNTER — RX RENEWAL (OUTPATIENT)
Age: 88
End: 2023-09-20

## 2023-09-26 ENCOUNTER — RX RENEWAL (OUTPATIENT)
Age: 88
End: 2023-09-26

## 2023-09-27 ENCOUNTER — APPOINTMENT (OUTPATIENT)
Dept: INTERNAL MEDICINE | Facility: CLINIC | Age: 88
End: 2023-09-27
Payer: MEDICARE

## 2023-09-27 VITALS
TEMPERATURE: 97.1 F | OXYGEN SATURATION: 98 % | SYSTOLIC BLOOD PRESSURE: 110 MMHG | HEART RATE: 58 BPM | RESPIRATION RATE: 16 BRPM | WEIGHT: 123 LBS | HEIGHT: 60 IN | BODY MASS INDEX: 24.15 KG/M2 | DIASTOLIC BLOOD PRESSURE: 70 MMHG

## 2023-09-27 DIAGNOSIS — R26.81 UNSTEADINESS ON FEET: ICD-10-CM

## 2023-09-27 PROCEDURE — 99214 OFFICE O/P EST MOD 30 MIN: CPT

## 2023-09-27 RX ORDER — MIRTAZAPINE 15 MG/1
15 TABLET, FILM COATED ORAL
Qty: 90 | Refills: 3 | Status: ACTIVE | COMMUNITY
Start: 2022-03-15 | End: 1900-01-01

## 2023-12-23 NOTE — GOALS OF CARE CONVERSATION - ADVANCED CARE PLANNING - CONVERSATION DETAILS
Start OTC daily age appropriate probiotic- culturelle/florastor.      grand daughter is HCP  dgtr - retired health care worker    discussed patient frailty, comorbidities, prognosis    patient already DNR/ DNI  in terms of comfort measures etc, family is able to provide full assist and will be available to make end of life care decisions as needed.  currently refused aggressive interventions

## 2023-12-26 ENCOUNTER — NON-APPOINTMENT (OUTPATIENT)
Age: 88
End: 2023-12-26

## 2023-12-31 PROBLEM — Z23 NEED FOR VACCINATION WITH 13-POLYVALENT PNEUMOCOCCAL CONJUGATE VACCINE: Status: RESOLVED | Noted: 2017-11-08 | Resolved: 2023-12-31

## 2024-03-27 ENCOUNTER — NON-APPOINTMENT (OUTPATIENT)
Age: 89
End: 2024-03-27

## 2024-03-27 ENCOUNTER — APPOINTMENT (OUTPATIENT)
Dept: INTERNAL MEDICINE | Facility: CLINIC | Age: 89
End: 2024-03-27

## 2024-03-27 DIAGNOSIS — E46 UNSPECIFIED PROTEIN-CALORIE MALNUTRITION: ICD-10-CM

## 2024-03-27 DIAGNOSIS — E78.5 HYPERLIPIDEMIA, UNSPECIFIED: ICD-10-CM

## 2024-04-15 ENCOUNTER — NON-APPOINTMENT (OUTPATIENT)
Age: 89
End: 2024-04-15

## 2024-04-25 ENCOUNTER — APPOINTMENT (OUTPATIENT)
Dept: INTERNAL MEDICINE | Facility: CLINIC | Age: 89
End: 2024-04-25
Payer: MEDICARE

## 2024-04-25 ENCOUNTER — LABORATORY RESULT (OUTPATIENT)
Age: 89
End: 2024-04-25

## 2024-04-25 VITALS
HEIGHT: 60 IN | OXYGEN SATURATION: 98 % | SYSTOLIC BLOOD PRESSURE: 120 MMHG | BODY MASS INDEX: 23.95 KG/M2 | TEMPERATURE: 97.3 F | HEART RATE: 53 BPM | RESPIRATION RATE: 16 BRPM | DIASTOLIC BLOOD PRESSURE: 60 MMHG | WEIGHT: 122 LBS

## 2024-04-25 DIAGNOSIS — E03.9 HYPOTHYROIDISM, UNSPECIFIED: ICD-10-CM

## 2024-04-25 DIAGNOSIS — R46.89 OTHER SYMPTOMS AND SIGNS INVOLVING APPEARANCE AND BEHAVIOR: ICD-10-CM

## 2024-04-25 DIAGNOSIS — R41.89 OTHER SYMPTOMS AND SIGNS INVOLVING COGNITIVE FUNCTIONS AND AWARENESS: ICD-10-CM

## 2024-04-25 DIAGNOSIS — R63.4 ABNORMAL WEIGHT LOSS: ICD-10-CM

## 2024-04-25 DIAGNOSIS — I10 ESSENTIAL (PRIMARY) HYPERTENSION: ICD-10-CM

## 2024-04-25 DIAGNOSIS — I50.9 HEART FAILURE, UNSPECIFIED: ICD-10-CM

## 2024-04-25 DIAGNOSIS — B37.9 CANDIDIASIS, UNSPECIFIED: ICD-10-CM

## 2024-04-25 DIAGNOSIS — R44.1 VISUAL HALLUCINATIONS: ICD-10-CM

## 2024-04-25 DIAGNOSIS — I48.20 CHRONIC ATRIAL FIBRILLATION, UNSP: ICD-10-CM

## 2024-04-25 PROCEDURE — 99214 OFFICE O/P EST MOD 30 MIN: CPT

## 2024-04-25 PROCEDURE — 36415 COLL VENOUS BLD VENIPUNCTURE: CPT

## 2024-04-25 RX ORDER — NYSTATIN 100000 1/G
100000 POWDER TOPICAL
Qty: 60 | Refills: 1 | Status: ACTIVE | COMMUNITY
Start: 2024-04-25 | End: 1900-01-01

## 2024-04-26 ENCOUNTER — APPOINTMENT (OUTPATIENT)
Dept: INTERNAL MEDICINE | Facility: CLINIC | Age: 89
End: 2024-04-26

## 2024-04-26 LAB
BASOPHILS # BLD AUTO: 0.09 K/UL
BASOPHILS NFR BLD AUTO: 1 %
CHOLEST SERPL-MCNC: 173 MG/DL
DIGOXIN SERPL-MCNC: 1.3 NG/ML
EOSINOPHIL # BLD AUTO: 0.43 K/UL
EOSINOPHIL NFR BLD AUTO: 4.9 %
FOLATE SERPL-MCNC: >20 NG/ML
HCT VFR BLD CALC: 38 %
HDLC SERPL-MCNC: 45 MG/DL
HGB BLD-MCNC: 11.7 G/DL
IMM GRANULOCYTES NFR BLD AUTO: 0.3 %
LDLC SERPL CALC-MCNC: 116 MG/DL
LYMPHOCYTES # BLD AUTO: 1.98 K/UL
LYMPHOCYTES NFR BLD AUTO: 22.3 %
MAN DIFF?: NORMAL
MCHC RBC-ENTMCNC: 28.5 PG
MCHC RBC-ENTMCNC: 30.8 GM/DL
MCV RBC AUTO: 92.7 FL
MONOCYTES # BLD AUTO: 0.67 K/UL
MONOCYTES NFR BLD AUTO: 7.6 %
NEUTROPHILS # BLD AUTO: 5.66 K/UL
NEUTROPHILS NFR BLD AUTO: 63.9 %
NONHDLC SERPL-MCNC: 128 MG/DL
PLATELET # BLD AUTO: 279 K/UL
RBC # BLD: 4.1 M/UL
RBC # FLD: 14 %
TRIGL SERPL-MCNC: 58 MG/DL
VIT B12 SERPL-MCNC: 1109 PG/ML
WBC # FLD AUTO: 8.86 K/UL

## 2024-04-28 ENCOUNTER — NON-APPOINTMENT (OUTPATIENT)
Age: 89
End: 2024-04-28

## 2024-04-29 DIAGNOSIS — R74.8 ABNORMAL LEVELS OF OTHER SERUM ENZYMES: ICD-10-CM

## 2024-04-30 LAB
ALBUMIN SERPL ELPH-MCNC: 4.5 G/DL
ALP BLD-CCNC: 124 U/L
ALT SERPL-CCNC: 13 U/L
ANION GAP SERPL CALC-SCNC: 14 MMOL/L
AST SERPL-CCNC: 12 U/L
BILIRUB SERPL-MCNC: 0.3 MG/DL
BUN SERPL-MCNC: 49 MG/DL
CALCIUM SERPL-MCNC: 9.7 MG/DL
CHLORIDE SERPL-SCNC: 107 MMOL/L
CO2 SERPL-SCNC: 20 MMOL/L
CREAT SERPL-MCNC: 1.08 MG/DL
EGFR: 48 ML/MIN/1.73M2
ESTIMATED AVERAGE GLUCOSE: 126 MG/DL
GGT SERPL-CCNC: 19 U/L
GLUCOSE SERPL-MCNC: 108 MG/DL
HBA1C MFR BLD HPLC: 6 %
POTASSIUM SERPL-SCNC: 4.7 MMOL/L
PROT SERPL-MCNC: 7.2 G/DL
SODIUM SERPL-SCNC: 140 MMOL/L
TSH SERPL-ACNC: 5 UIU/ML

## 2024-04-30 NOTE — ADDENDUM
[FreeTextEntry1] : Mildly elevated alkaline phosphatase level Normal GGT  Thyroid function slightly under controlled for age She is currently on levothyroxine 50 mcg once daily I recommend the following: Levothyroxine 50 mcg Monday to Saturday and 100 mg Sunday Repeat thyroid function studies 6 weeks along with a repeat CMP, GGT  If alkaline phosphatase level is still elevated at that time may need to undergo an additional workup to identify the cause

## 2024-04-30 NOTE — HISTORY OF PRESENT ILLNESS
[FreeTextEntry1] :  UNINTENTIONAL WEIGHT LOSS, htn, gait instability, cognitive impairment [de-identified] : -PMH: HTN, H/o CVA (2000), AFib (Not on AC 2/2 h/o ICH), CHFrEF, LV Aneurysm w/ Thrombus, Hypothyroidism, GERD, Recurrent UTI, Sleep Dysfxn, Gait Instability -SH: . 3 children. Non-smoker. Occasional EtOH use.   TERESA is a 89 year F whom is here today for F/U UNINTENTIONAL WEIGHT LOSS, htn, cognitive impairment, hypothyroid She is accompanied by her Grand Daughter today Was treated for a UTI last week  Per arina present today, she reports that their are times of visual hallucinations and agitation  -HTN, H/o CVA (2000), AFib, CHFrEF (9/2022) ECHO: EF 45%., LV Aneurysm w/ Thrombus (Resolved on ECHO 9/2022): ASA 81mg only, Not on AC 2/2 h/o ICH. Managed on Aldactone 25mg QD, Furosemide 20mg PRN, Digoxin 125mcg QD, Metoprolol 50mg BID & Losartan 25mg QD. Follows w/ Cardio.    -Hypothyroidism: Remains on Levothyroxine 50mcg QD.  -GERD: Remains on Pepcid 20mg HS. DOing well off Omeprazole  -Memory Difficulties: Remains on Remeron.  -Sleep Dysfunction & Poor Appetite: Sleep & Mood improved on Remeron  -Gait Instability: Denies recent falls. Completed Home PT/OT. Lives w/ her Grand Daughter in a 3 floor home. Has Shower Bench & Grab Bars. Rarely home alone. Does not have fall alert system.  -Recurrent UTI: Managed as per Uro

## 2024-05-03 RX ORDER — LEVOTHYROXINE SODIUM 0.05 MG/1
50 TABLET ORAL
Qty: 90 | Refills: 3 | Status: ACTIVE | COMMUNITY
Start: 2020-05-26

## 2024-06-09 ENCOUNTER — NON-APPOINTMENT (OUTPATIENT)
Age: 89
End: 2024-06-09

## 2024-06-11 ENCOUNTER — APPOINTMENT (OUTPATIENT)
Dept: INTERNAL MEDICINE | Facility: CLINIC | Age: 89
End: 2024-06-11

## 2024-07-24 NOTE — ED ADULT NURSE NOTE - DATE OF LAST VACCINATION
Protonix 40 mg 1 p.o. q.d. p.r.n. reflux patient reports he is helping the symptoms does not need every single day but most days he does take it finds it is helping.   12-May-2021

## 2024-09-03 ENCOUNTER — APPOINTMENT (OUTPATIENT)
Dept: UROGYNECOLOGY | Facility: CLINIC | Age: 89
End: 2024-09-03

## 2024-09-03 VITALS
BODY MASS INDEX: 24.74 KG/M2 | WEIGHT: 126 LBS | DIASTOLIC BLOOD PRESSURE: 78 MMHG | HEIGHT: 60 IN | SYSTOLIC BLOOD PRESSURE: 138 MMHG

## 2024-09-03 LAB
BILIRUB UR QL STRIP: NEGATIVE
CLARITY UR: CLEAR
COLLECTION METHOD: NORMAL
GLUCOSE UR-MCNC: NEGATIVE
HCG UR QL: 0.2 EU/DL
HGB UR QL STRIP.AUTO: NORMAL
KETONES UR-MCNC: NEGATIVE
LEUKOCYTE ESTERASE UR QL STRIP: NORMAL
NITRITE UR QL STRIP: POSITIVE
PH UR STRIP: 5.5
PROT UR STRIP-MCNC: NEGATIVE
SP GR UR STRIP: 1.01

## 2024-09-03 PROCEDURE — 51701 INSERT BLADDER CATHETER: CPT | Mod: 59

## 2024-09-03 PROCEDURE — 99204 OFFICE O/P NEW MOD 45 MIN: CPT | Mod: 25

## 2024-09-03 PROCEDURE — 99459 PELVIC EXAMINATION: CPT

## 2024-09-03 PROCEDURE — 81003 URINALYSIS AUTO W/O SCOPE: CPT | Mod: QW

## 2024-09-03 NOTE — PHYSICAL EXAM
[Chaperone Present] : A chaperone was present in the examining room during all aspects of the physical examination [No Acute Distress] : in no acute distress [Well developed] : well developed [Well Nourished] : ~L well nourished [Oriented x3] : oriented to person, place, and time [43592] : A chaperone was present during the pelvic exam. [No Edema] : ~T edema was not present [Warm and Dry] : was warm and dry to touch [Vulvar Atrophy] : vulvar atrophy [Labia Majora] : were normal [Labia Minora] : were normal [Normal Appearance] : general appearance was normal [Atrophy] : atrophy [Normal] : was normal [None] : no CVA tenderness [FreeTextEntry2] :  Carmela Dewitt [Cough] : no cough [Tenderness] : ~T no ~M abdominal tenderness observed [Distended] : not distended

## 2024-09-03 NOTE — PHYSICAL EXAM
[Chaperone Present] : A chaperone was present in the examining room during all aspects of the physical examination [No Acute Distress] : in no acute distress [Well developed] : well developed [Well Nourished] : ~L well nourished [Oriented x3] : oriented to person, place, and time [60223] : A chaperone was present during the pelvic exam. [No Edema] : ~T edema was not present [Warm and Dry] : was warm and dry to touch [Vulvar Atrophy] : vulvar atrophy [Labia Majora] : were normal [Labia Minora] : were normal [Normal Appearance] : general appearance was normal [Atrophy] : atrophy [Normal] : was normal [None] : no CVA tenderness [FreeTextEntry2] :  Carmela Dewitt [Cough] : no cough [Tenderness] : ~T no ~M abdominal tenderness observed [Distended] : not distended

## 2024-09-03 NOTE — ADDENDUM
[FreeTextEntry1] : This note was written by Carmela Dewitt, acting as the  for Dr. Driscoll. This note accurately reflects the work and decisions made by Dr. Driscoll.

## 2024-09-03 NOTE — DISCUSSION/SUMMARY
[FreeTextEntry1] : TERESA is a 89 year female who presents for f/u on recurrent UTIs. Recent UTI in April  We discussed etiology and management of recurrent urinary tract infections. We discussed behavioral modifications including increased oral intake, cranberry tablets, wiping front to back, d mannose. We discussed management of recurrent UTIs with vaginal estrogen and antibiotic prophylaxis. She is interested in vaginal estrogen and discussed nonhormonal vaginal lubricants. We discussed that if she is symptomatic I prefer for catheterized specimen however if she cannot come to the office, discussed u/a and culture prior to antibiotic treatment.  [] Uqora [] Vaginal estrogen cream - rx sent risks/benefits discussed  f/u 2 months All questions answered.

## 2024-09-03 NOTE — PROCEDURE
[Straight Catheterization] : insertion of a straight catheter [Urinary Tract Infection] : a urinary tract infection [Patient] : the patient [___ Fr Straight Tip] : a [unfilled] in [place] straight tip catheter [None] : there were no complications with the catheter insertion [Clear] : clear [No Complications] : no complications [Tolerated Well] : the patient tolerated the procedure well [Post procedure instructions and information given] : Post procedure instructions and information were given and reviewed with patient. [0] : 0

## 2024-09-03 NOTE — HISTORY OF PRESENT ILLNESS
[FreeTextEntry1] : TERESA is a 89 year female who presents for f/u on recurrent UTIs. Last seen in office on 02/05/2021 for UTI symptoms, treated empirically with Bactirm. Advised to start vaginal estrogen. Negative cysto with me 09/2019. Had some health issues over the past few years. Last UTI was in April of this year. Received 2 courses of antibiotics and symptoms resolved. Her granddaughter reports the patient is asymptomatic during infections, but she reports the patient has AMS and incontinence during UTIs. Denies symptoms today. No dysuria, urgency, frequency, or hematuria. Has not used the vaginal estrogen cream. Denies h/o breast cancer.  Positive Urine Cx 12/08/2021 - 50,000 - 99,000 CFU/mL Escherichia coli, 10,000 - 49,000 CFU/mL Enterococcus faecalis UA w/ micro 12/08/2021 - Lily, Slightly turbid, Protein 100mg/dL, Moderate blood, Large leukocyte esterase, Urobilinogen 3mg/dL, 152 WBCs, 17 RBCs, Occaisonal bacteria, Few calcium oxalate crystals  Positive Urine Cx 02/05/2021 - >100,000 CFU/ml Escherichia coli UA w/ micro 02/05/2021 - Slightly turbid, Small blood, Large leukocyte esterase, 120 WBCs, Occasional bacteria.

## 2024-09-03 NOTE — PROCEDURE
[Straight Catheterization] : insertion of a straight catheter [Urinary Tract Infection] : a urinary tract infection [Patient] : the patient [___ Fr Straight Tip] : a [unfilled] in [place] straight tip catheter [None] : there were no complications with the catheter insertion [Clear] : clear [No Complications] : no complications [Tolerated Well] : the patient tolerated the procedure well [Post procedure instructions and information given] : Post procedure instructions and information were given and reviewed with patient. [0] : 0

## 2024-09-04 LAB
APPEARANCE: ABNORMAL
BACTERIA: ABNORMAL /HPF
BILIRUBIN URINE: NEGATIVE
BLOOD URINE: ABNORMAL
CAST: 1 /LPF
COLOR: NORMAL
EPITHELIAL CELLS: 1 /HPF
GLUCOSE QUALITATIVE U: NEGATIVE MG/DL
KETONES URINE: NEGATIVE MG/DL
LEUKOCYTE ESTERASE URINE: ABNORMAL
MICROSCOPIC-UA: NORMAL
NITRITE URINE: POSITIVE
PH URINE: 5.5
PROTEIN URINE: NEGATIVE MG/DL
RED BLOOD CELLS URINE: 1 /HPF
REVIEW: NORMAL
SPECIFIC GRAVITY URINE: 1.02
UROBILINOGEN URINE: 0.2 MG/DL
WHITE BLOOD CELLS URINE: 65 /HPF

## 2024-09-09 DIAGNOSIS — N39.0 URINARY TRACT INFECTION, SITE NOT SPECIFIED: ICD-10-CM

## 2024-09-09 RX ORDER — CEPHALEXIN 500 MG/1
500 CAPSULE ORAL
Qty: 14 | Refills: 0 | Status: ACTIVE | COMMUNITY
Start: 2024-09-09 | End: 1900-01-01

## 2024-09-11 ENCOUNTER — RX RENEWAL (OUTPATIENT)
Age: 89
End: 2024-09-11

## 2024-09-24 NOTE — PHYSICAL THERAPY INITIAL EVALUATION ADULT - GAIT DISTANCE, PT EVAL
- increase lisinopril to 10mg once a day  - get blood work after the visit    - Please call me in 10 days after having your blood work done to review the results if you do not hear back from me or my office, as I may have not received the results.  - please remember to perform blood work prior to the next visit.  - Please call if the blood pressure top number is greater than 140 or less than 110 consistently.  - Please call if you are gaining more than 2lbs in 2 days for adjustment of water pills.  ~ Please AVOID the following pain medications.  LIST OF NSAIDS (NONSTEROIDAL ANTI-INFLAMMATORY DRUGS) AND BOBO-2 INHIBITORS    DIFLUNISAL (DOLOBID)  IBUPROFEN (MOTRIN, ADVIL)  FLURBIPROFEN (ANSAID)  KETOPROFEN (ORUDIS, ORUVAIL)  FENOPROFEN (NALFON)  NABUMETONE (RELAFEN)  PIROXICAM (FELDENE)  NAPROXEN (ALEVE, NAPROSYN, NAPRELAN, ANAPROX)  DICLOFENAC (VOLTAREN, CATAFLAM)  INDOMETHACIN (INDOCIN)  SULINDAC (CLINORIL)  TOLMETIN (TOLETIN)  ETODOLAC (LODINE)  MELOXICAM (MOBIC)  KETOROLAC (TORADOL)  OXAPROZIN (DAYPRO)  CELECOXIB (CELEBREX)    Things to do to reduce your blood pressure include working with all your physician to do the following:  ~ stop smoking if you smoke.  ~ increase cardiovascular exercise like walking and swimming.   ~ modify your diet to decrease fat and salt intake.  ~ reduce your weight if you are overweight or obese.  ~ increase the consumption of fruits, vegetables and whole grains.  ~ decrease alcohol consumption if you consume alcohol.   ~ try to minimize stress in your life with lifestyle modifications.   ~ be compliant with your anti-hypertensive medications.   ~ adjust your medications to help improve your vascular stiffness and decrease risks for heart attacks and strokes.    2 steps at the bedside to the right in frontal plane

## 2024-10-04 ENCOUNTER — EMERGENCY (EMERGENCY)
Facility: HOSPITAL | Age: 89
LOS: 1 days | End: 2024-10-04
Attending: EMERGENCY MEDICINE
Payer: MEDICARE

## 2024-10-04 VITALS
OXYGEN SATURATION: 98 % | TEMPERATURE: 98 F | SYSTOLIC BLOOD PRESSURE: 142 MMHG | RESPIRATION RATE: 19 BRPM | HEART RATE: 70 BPM | DIASTOLIC BLOOD PRESSURE: 77 MMHG

## 2024-10-04 VITALS
SYSTOLIC BLOOD PRESSURE: 191 MMHG | RESPIRATION RATE: 98 BRPM | DIASTOLIC BLOOD PRESSURE: 81 MMHG | TEMPERATURE: 98 F | WEIGHT: 117.95 LBS | HEART RATE: 78 BPM | OXYGEN SATURATION: 98 %

## 2024-10-04 DIAGNOSIS — Z98.891 HISTORY OF UTERINE SCAR FROM PREVIOUS SURGERY: Chronic | ICD-10-CM

## 2024-10-04 DIAGNOSIS — Z98.890 OTHER SPECIFIED POSTPROCEDURAL STATES: Chronic | ICD-10-CM

## 2024-10-04 LAB
ALBUMIN SERPL ELPH-MCNC: 4.1 G/DL — SIGNIFICANT CHANGE UP (ref 3.3–5.2)
ALP SERPL-CCNC: 131 U/L — HIGH (ref 40–120)
ALT FLD-CCNC: 13 U/L — SIGNIFICANT CHANGE UP
ANION GAP SERPL CALC-SCNC: 11 MMOL/L — SIGNIFICANT CHANGE UP (ref 5–17)
APPEARANCE UR: ABNORMAL
AST SERPL-CCNC: 13 U/L — SIGNIFICANT CHANGE UP
BACTERIA # UR AUTO: ABNORMAL /HPF
BASOPHILS # BLD AUTO: 0.08 K/UL — SIGNIFICANT CHANGE UP (ref 0–0.2)
BASOPHILS NFR BLD AUTO: 1 % — SIGNIFICANT CHANGE UP (ref 0–2)
BILIRUB SERPL-MCNC: 0.7 MG/DL — SIGNIFICANT CHANGE UP (ref 0.4–2)
BILIRUB UR-MCNC: NEGATIVE — SIGNIFICANT CHANGE UP
BUN SERPL-MCNC: 16.3 MG/DL — SIGNIFICANT CHANGE UP (ref 8–20)
CALCIUM SERPL-MCNC: 9.4 MG/DL — SIGNIFICANT CHANGE UP (ref 8.4–10.5)
CAST: 0 /LPF — SIGNIFICANT CHANGE UP (ref 0–4)
CHLORIDE SERPL-SCNC: 103 MMOL/L — SIGNIFICANT CHANGE UP (ref 96–108)
CO2 SERPL-SCNC: 25 MMOL/L — SIGNIFICANT CHANGE UP (ref 22–29)
COLOR SPEC: YELLOW — SIGNIFICANT CHANGE UP
CREAT SERPL-MCNC: 0.99 MG/DL — SIGNIFICANT CHANGE UP (ref 0.5–1.3)
DIFF PNL FLD: ABNORMAL
EGFR: 54 ML/MIN/1.73M2 — LOW
EOSINOPHIL # BLD AUTO: 0.02 K/UL — SIGNIFICANT CHANGE UP (ref 0–0.5)
EOSINOPHIL NFR BLD AUTO: 0.2 % — SIGNIFICANT CHANGE UP (ref 0–6)
GLUCOSE SERPL-MCNC: 91 MG/DL — SIGNIFICANT CHANGE UP (ref 70–99)
GLUCOSE UR QL: NEGATIVE MG/DL — SIGNIFICANT CHANGE UP
HCT VFR BLD CALC: 38.7 % — SIGNIFICANT CHANGE UP (ref 34.5–45)
HGB BLD-MCNC: 12.8 G/DL — SIGNIFICANT CHANGE UP (ref 11.5–15.5)
IMM GRANULOCYTES NFR BLD AUTO: 0.5 % — SIGNIFICANT CHANGE UP (ref 0–0.9)
KETONES UR-MCNC: NEGATIVE MG/DL — SIGNIFICANT CHANGE UP
LACTATE BLDV-MCNC: 1.6 MMOL/L — SIGNIFICANT CHANGE UP (ref 0.5–2)
LEUKOCYTE ESTERASE UR-ACNC: ABNORMAL
LYMPHOCYTES # BLD AUTO: 0.92 K/UL — LOW (ref 1–3.3)
LYMPHOCYTES # BLD AUTO: 11.5 % — LOW (ref 13–44)
MCHC RBC-ENTMCNC: 28.8 PG — SIGNIFICANT CHANGE UP (ref 27–34)
MCHC RBC-ENTMCNC: 33.1 GM/DL — SIGNIFICANT CHANGE UP (ref 32–36)
MCV RBC AUTO: 87 FL — SIGNIFICANT CHANGE UP (ref 80–100)
MONOCYTES # BLD AUTO: 0.69 K/UL — SIGNIFICANT CHANGE UP (ref 0–0.9)
MONOCYTES NFR BLD AUTO: 8.6 % — SIGNIFICANT CHANGE UP (ref 2–14)
NEUTROPHILS # BLD AUTO: 6.28 K/UL — SIGNIFICANT CHANGE UP (ref 1.8–7.4)
NEUTROPHILS NFR BLD AUTO: 78.2 % — HIGH (ref 43–77)
NITRITE UR-MCNC: POSITIVE
PH UR: 6 — SIGNIFICANT CHANGE UP (ref 5–8)
PLATELET # BLD AUTO: 221 K/UL — SIGNIFICANT CHANGE UP (ref 150–400)
POTASSIUM SERPL-MCNC: 4.5 MMOL/L — SIGNIFICANT CHANGE UP (ref 3.5–5.3)
POTASSIUM SERPL-SCNC: 4.5 MMOL/L — SIGNIFICANT CHANGE UP (ref 3.5–5.3)
PROT SERPL-MCNC: 7.2 G/DL — SIGNIFICANT CHANGE UP (ref 6.6–8.7)
PROT UR-MCNC: 30 MG/DL
RBC # BLD: 4.45 M/UL — SIGNIFICANT CHANGE UP (ref 3.8–5.2)
RBC # FLD: 13.2 % — SIGNIFICANT CHANGE UP (ref 10.3–14.5)
RBC CASTS # UR COMP ASSIST: 2 /HPF — SIGNIFICANT CHANGE UP (ref 0–4)
SODIUM SERPL-SCNC: 139 MMOL/L — SIGNIFICANT CHANGE UP (ref 135–145)
SP GR SPEC: 1.01 — SIGNIFICANT CHANGE UP (ref 1–1.03)
SQUAMOUS # UR AUTO: 1 /HPF — SIGNIFICANT CHANGE UP (ref 0–5)
UROBILINOGEN FLD QL: 1 MG/DL — SIGNIFICANT CHANGE UP (ref 0.2–1)
WBC # BLD: 8.03 K/UL — SIGNIFICANT CHANGE UP (ref 3.8–10.5)
WBC # FLD AUTO: 8.03 K/UL — SIGNIFICANT CHANGE UP (ref 3.8–10.5)
WBC UR QL: 807 /HPF — HIGH (ref 0–5)

## 2024-10-04 PROCEDURE — 99284 EMERGENCY DEPT VISIT MOD MDM: CPT

## 2024-10-04 RX ORDER — SODIUM CHLORIDE 0.9 % (FLUSH) 0.9 %
500 SYRINGE (ML) INJECTION ONCE
Refills: 0 | Status: COMPLETED | OUTPATIENT
Start: 2024-10-04 | End: 2024-10-04

## 2024-10-04 RX ORDER — CEFTRIAXONE SODIUM 1 G
1000 VIAL (EA) INJECTION ONCE
Refills: 0 | Status: COMPLETED | OUTPATIENT
Start: 2024-10-04 | End: 2024-10-04

## 2024-10-04 RX ADMIN — Medication 500 MILLILITER(S): at 14:27

## 2024-10-04 RX ADMIN — Medication 1000 MILLIGRAM(S): at 16:21

## 2024-10-04 NOTE — ED ADULT NURSE NOTE - OBJECTIVE STATEMENT
Assumed care of pt at 1220. Pt with history of dementia c/o dehydration, weakness and decreased appetite, granddaughter at bedside, pt denies N/V/D/CP/SOB, pt resting comfortably showing no signs of respiratory distress or pain, pt is calm and cooperative

## 2024-10-04 NOTE — ED ADULT NURSE NOTE - NSFALLHARMRISKINTERV_ED_ALL_ED
Assistance OOB with selected safe patient handling equipment if applicable/Communicate risk of Fall with Harm to all staff, patient, and family/Provide visual cue: red socks, yellow wristband, yellow gown, etc/Reinforce activity limits and safety measures with patient and family/Bed in lowest position, wheels locked, appropriate side rails in place/Call bell, personal items and telephone in reach/Instruct patient to call for assistance before getting out of bed/chair/stretcher/Non-slip footwear applied when patient is off stretcher/[place] to call system/Physically safe environment - no spills, clutter or unnecessary equipment/Purposeful Proactive Rounding/Room/bathroom lighting operational, light cord in reach

## 2024-10-04 NOTE — ED PROVIDER NOTE - NSFOLLOWUPINSTRUCTIONS_ED_ALL_ED_FT
Please return to the emergency department If the patient starts to vomit, develops a fever, becomes lethargic or more confused.  Antibiotics prescribed are cefuroxime 500mg one  tablet twice a day for 7 days.

## 2024-10-04 NOTE — ED PROVIDER NOTE - OBJECTIVE STATEMENT
89-year-old  female is brought in for evaluation by her granddaughter patient had decreased p.o. intake and decreased fluid intake yesterday.  Granddaughter feels that the patient is dehydrated and is a little more confused.  Patient has a history of UTIs.  No nausea, vomiting, diarrhea no fever patient has no complaints.

## 2024-10-04 NOTE — ED PROVIDER NOTE - PATIENT PORTAL LINK FT
You can access the FollowMyHealth Patient Portal offered by [facility] by registering at the following website: [email or web address]. By joining [employer]’s FollowMyHealth portal, you will also be able to view your health information using other applications (apps) compatible with our system.

## 2024-10-04 NOTE — ED PROVIDER NOTE - ED STEMI HIDDEN
Detail Level: Simple Additional Notes: Patient stated that she was currently using OTC combination tretinoin 0.005%/niacinamide/transxamic acid with minimal improvement; however, was concerned about strength of tretinoin. Additional Notes: Clinical examination most significant for white, powdery substance on the distal portion of nail plate. High concern for superficial white onychomycosis. Will therefore plan to send for fungal culture. I discussed with the patient various options of therapy including fungal culture + initiation of topical treatment as we wait for results. Patient opted to hold on treatment until results Render Risk Assessment In Note?: no Additional Notes: We discussed clinical finding and benign nature. Patient opted to hold on intervention at this time. hide Additional Notes: Considered contact dermatitis given limited involvement around suture line; however, also considered infectious cause given significant presence of inflamed pustules. OK for patient to start topical clobetasol cream as prescribed by her surgeons; however, recommended for patient to notify clinic if rash has not improved in the next 2 weeks. May need to consider bacterial culture and initiation of short course of oral antibiotics.

## 2024-10-04 NOTE — ED ADULT NURSE REASSESSMENT NOTE - NS ED NURSE REASSESS COMMENT FT1
Assumed care of patient at approx 15:00. Patient AxOx1, Patient resting in stretcher at this time. Resp even and unlabored. Patient offers no complaints at this time. No visible signs of acute distress noted, safety maintained.

## 2024-10-04 NOTE — ED PROVIDER NOTE - CLINICAL SUMMARY MEDICAL DECISION MAKING FREE TEXT BOX
89-year-old  female is brought in for evaluation by her granddaughter patient had decreased p.o. intake and decreased fluid intake yesterday.  Granddaughter feels that the patient is dehydrated and is a little more confused.  Patient has a history of UTIs.  No nausea, vomiting, diarrhea no fever patient has no complaints.  Vital signs are stable.  Labs are within normal limits.  Urine is infected.  Large quantity of white cells.  Patient was able to ambulate in the ED and family wants to take her home with p.o. antibiotics.

## 2024-10-04 NOTE — ED ADULT TRIAGE NOTE - CHIEF COMPLAINT QUOTE
Pt with PMHx dementia, HTN, CHF brought to ED by granddaughter for decreased appetitive, chills and rhinitis. Granddaughter states that the pt has been eating less than usual and is concerned for dehydration. Pt c/o feeling weak and tired.

## 2024-10-10 ENCOUNTER — RX RENEWAL (OUTPATIENT)
Age: 89
End: 2024-10-10

## 2024-10-14 ENCOUNTER — RX RENEWAL (OUTPATIENT)
Age: 89
End: 2024-10-14

## 2024-11-12 ENCOUNTER — APPOINTMENT (OUTPATIENT)
Dept: UROGYNECOLOGY | Facility: CLINIC | Age: 89
End: 2024-11-12
Payer: MEDICARE

## 2024-11-12 VITALS
RESPIRATION RATE: 14 BRPM | SYSTOLIC BLOOD PRESSURE: 122 MMHG | WEIGHT: 116 LBS | HEIGHT: 59 IN | DIASTOLIC BLOOD PRESSURE: 62 MMHG | HEART RATE: 64 BPM | BODY MASS INDEX: 23.39 KG/M2

## 2024-11-12 DIAGNOSIS — N39.0 URINARY TRACT INFECTION, SITE NOT SPECIFIED: ICD-10-CM

## 2024-11-12 LAB
BILIRUB UR QL STRIP: NEGATIVE
CLARITY UR: NORMAL
COLLECTION METHOD: NORMAL
GLUCOSE UR-MCNC: NEGATIVE
HCG UR QL: 1 EU/DL
HGB UR QL STRIP.AUTO: NORMAL
KETONES UR-MCNC: NEGATIVE
LEUKOCYTE ESTERASE UR QL STRIP: NORMAL
NITRITE UR QL STRIP: POSITIVE
PH UR STRIP: 5.5
PROT UR STRIP-MCNC: NORMAL
SP GR UR STRIP: 1.01

## 2024-11-12 PROCEDURE — 51701 INSERT BLADDER CATHETER: CPT | Mod: 59

## 2024-11-12 PROCEDURE — 81003 URINALYSIS AUTO W/O SCOPE: CPT | Mod: QW

## 2024-11-12 PROCEDURE — 99459 PELVIC EXAMINATION: CPT

## 2024-11-12 PROCEDURE — 99214 OFFICE O/P EST MOD 30 MIN: CPT | Mod: 25

## 2024-11-12 RX ORDER — ESTRADIOL 0.1 MG/G
0.1 CREAM VAGINAL
Qty: 1 | Refills: 2 | Status: ACTIVE | COMMUNITY
Start: 2024-11-12 | End: 1900-01-01

## 2024-11-12 RX ORDER — FOSFOMYCIN TROMETHAMINE 3 G/1
3 POWDER ORAL
Qty: 10 | Refills: 0 | Status: ACTIVE | COMMUNITY
Start: 2024-11-12 | End: 1900-01-01

## 2024-11-15 RX ORDER — AMOXICILLIN AND CLAVULANATE POTASSIUM 875; 125 MG/1; MG/1
875-125 TABLET, COATED ORAL TWICE DAILY
Qty: 14 | Refills: 0 | Status: ACTIVE | COMMUNITY
Start: 2024-11-15 | End: 1900-01-01

## 2024-11-20 PROCEDURE — 36415 COLL VENOUS BLD VENIPUNCTURE: CPT

## 2024-11-20 PROCEDURE — 83605 ASSAY OF LACTIC ACID: CPT

## 2024-11-20 PROCEDURE — 81001 URINALYSIS AUTO W/SCOPE: CPT

## 2024-11-20 PROCEDURE — 85025 COMPLETE CBC W/AUTO DIFF WBC: CPT

## 2024-11-20 PROCEDURE — 99284 EMERGENCY DEPT VISIT MOD MDM: CPT | Mod: 25

## 2024-11-20 PROCEDURE — 96374 THER/PROPH/DIAG INJ IV PUSH: CPT

## 2024-11-20 PROCEDURE — 80053 COMPREHEN METABOLIC PANEL: CPT

## 2024-12-03 ENCOUNTER — APPOINTMENT (OUTPATIENT)
Dept: INTERNAL MEDICINE | Facility: CLINIC | Age: 88
End: 2024-12-03
Payer: MEDICARE

## 2024-12-03 VITALS
DIASTOLIC BLOOD PRESSURE: 58 MMHG | SYSTOLIC BLOOD PRESSURE: 100 MMHG | HEART RATE: 53 BPM | BODY MASS INDEX: 23.39 KG/M2 | TEMPERATURE: 97.3 F | HEIGHT: 59 IN | WEIGHT: 116 LBS | OXYGEN SATURATION: 98 % | RESPIRATION RATE: 14 BRPM

## 2024-12-03 DIAGNOSIS — R63.4 ABNORMAL WEIGHT LOSS: ICD-10-CM

## 2024-12-03 DIAGNOSIS — E03.9 HYPOTHYROIDISM, UNSPECIFIED: ICD-10-CM

## 2024-12-03 DIAGNOSIS — R41.89 OTHER SYMPTOMS AND SIGNS INVOLVING COGNITIVE FUNCTIONS AND AWARENESS: ICD-10-CM

## 2024-12-03 DIAGNOSIS — I10 ESSENTIAL (PRIMARY) HYPERTENSION: ICD-10-CM

## 2024-12-03 DIAGNOSIS — R26.81 UNSTEADINESS ON FEET: ICD-10-CM

## 2024-12-03 DIAGNOSIS — K21.9 GASTRO-ESOPHAGEAL REFLUX DISEASE W/OUT ESOPHAGITIS: ICD-10-CM

## 2024-12-03 PROCEDURE — 36415 COLL VENOUS BLD VENIPUNCTURE: CPT

## 2024-12-03 PROCEDURE — G2211 COMPLEX E/M VISIT ADD ON: CPT

## 2024-12-03 PROCEDURE — 99214 OFFICE O/P EST MOD 30 MIN: CPT

## 2024-12-04 LAB
25(OH)D3 SERPL-MCNC: 59.9 NG/ML
BASOPHILS # BLD AUTO: 0.12 K/UL
BASOPHILS NFR BLD AUTO: 1.2 %
CHOLEST SERPL-MCNC: 176 MG/DL
EOSINOPHIL # BLD AUTO: 0.45 K/UL
EOSINOPHIL NFR BLD AUTO: 4.6 %
FERRITIN SERPL-MCNC: 57 NG/ML
FOLATE SERPL-MCNC: >20 NG/ML
HCT VFR BLD CALC: 39.8 %
HDLC SERPL-MCNC: 46 MG/DL
HGB BLD-MCNC: 12.7 G/DL
IMM GRANULOCYTES NFR BLD AUTO: 0.4 %
LDLC SERPL CALC-MCNC: 113 MG/DL
LYMPHOCYTES # BLD AUTO: 1.91 K/UL
LYMPHOCYTES NFR BLD AUTO: 19.5 %
MAN DIFF?: NORMAL
MCHC RBC-ENTMCNC: 28.9 PG
MCHC RBC-ENTMCNC: 31.9 G/DL
MCV RBC AUTO: 90.7 FL
MONOCYTES # BLD AUTO: 0.62 K/UL
MONOCYTES NFR BLD AUTO: 6.3 %
NEUTROPHILS # BLD AUTO: 6.66 K/UL
NEUTROPHILS NFR BLD AUTO: 68 %
NONHDLC SERPL-MCNC: 130 MG/DL
PLATELET # BLD AUTO: 292 K/UL
RBC # BLD: 4.39 M/UL
RBC # FLD: 13.7 %
TRIGL SERPL-MCNC: 91 MG/DL
VIT B12 SERPL-MCNC: 986 PG/ML
WBC # FLD AUTO: 9.8 K/UL

## 2024-12-05 ENCOUNTER — APPOINTMENT (OUTPATIENT)
Dept: INTERNAL MEDICINE | Facility: CLINIC | Age: 88
End: 2024-12-05

## 2024-12-05 PROCEDURE — 36415 COLL VENOUS BLD VENIPUNCTURE: CPT

## 2024-12-05 NOTE — DISCHARGE NOTE NURSING/CASE MANAGEMENT/SOCIAL WORK - NSCORESITESY/N_GEN_A_CORE_RD
[FreeTextEntry1] : This is a 46-year-old female past medical history significant for hypercholesterolemia, dyspepsia, mitral valve prolapse, murmur, status post COVID-19 infection December 2021, who comes in for cardiac follow-up evaluation.   She denies chest pain, shortness of breath, dizziness or syncope. Her cardiac risk factors include positive family history of heart disease (the father myocardial infarction at age 54), hypercholesterolemia, and smoking 1-1/2 packs per week. Electrocardiogram done December 5, 2024 demonstrated normal sinus rhythm at a rate of 67 bpm is otherwise unremarkable. The patient discontinued her atorvastatin in January 2024 secondary to "memory loss". Coronary artery calcium score done January 25, 2024 was 0. The patient started red rice yeast approximately 2 months ago. Lipid panel done October 30, 2024 demonstrated cholesterol 189, HDL 74, LDL calculated 98, non-HDL cholesterol 116, triglycerides 102 mg/dL. The patient will have new blood work done today for lipoprotein a, lipoprotein B, high-sensitivity C-reactive protein, SMA 20, lipid panel and hemoglobin A1c. I have explained to her there is no data with red rice yeast reducing cardiovascular events.  Also red rice yeast is the mother molecule for the statin medications. I prefer she be on proven lipid-lowering therapy but will discuss this further when the results of blood tests are available for my review. Smoking cessation was recommended. I also asked her to increase her aerobic activity to 40 minutes 4 times per week.   The patient had a normal exercise stress test December 28, 2022. Lipid profile done June 27, 2022 demonstrated cholesterol 181, HDL 63, triglycerides 107, LDL calculated 96, non-HDL cholesterol 118, LDL direct and 96 mg/dL with hemoglobin A1c of 5.4.  The patient had a normal exercise stress test September 14, 2021  Electrocardiogram done July 21, 2021 demonstrates normal sinus rhythm rate 68 bpm is otherwise unremarkable.  The patient had normal exercise stress test July 13, 2020.  The patient had an elevated cardio CRP of 5.9 December 27, 2019.  This was associated with a cholesterol 161, HDL of 69, triglycerides of 71, and LDL direct 70 mg/dL. The patient had new blood work done July 13, 2020 which demonstrated cholesterol 163, triglycerides 76, HDL 64, LDL direct of 89 mg/dL with a high-sensitivity CRP of 2.5.  The patient is very concerned about early coronary artery disease given her father's history of early myocardial infarction. She is still smoking a pack per week.  Smoking cessation was discussed.  New blood work will be done today for direct LDL, lipid panel, and high-sensitivity C-reactive protein.  The patient understands that aerobic exercises must be increased to 40 minutes 4 times per week. A detailed discussion of lifestyle modification was done today. The patient has a good understanding of the diagnosis, and treatment plan. Lifestyle modification was also outlined.
No

## 2024-12-06 ENCOUNTER — NON-APPOINTMENT (OUTPATIENT)
Age: 88
End: 2024-12-06

## 2024-12-06 ENCOUNTER — INPATIENT (INPATIENT)
Facility: HOSPITAL | Age: 88
LOS: 1 days | Discharge: HOME | DRG: 309 | End: 2024-12-08
Attending: STUDENT IN AN ORGANIZED HEALTH CARE EDUCATION/TRAINING PROGRAM | Admitting: HOSPITALIST
Payer: MEDICARE

## 2024-12-06 ENCOUNTER — APPOINTMENT (OUTPATIENT)
Dept: INTERNAL MEDICINE | Facility: CLINIC | Age: 88
End: 2024-12-06
Payer: MEDICARE

## 2024-12-06 VITALS
RESPIRATION RATE: 18 BRPM | HEART RATE: 41 BPM | TEMPERATURE: 96 F | SYSTOLIC BLOOD PRESSURE: 165 MMHG | OXYGEN SATURATION: 96 % | WEIGHT: 118.17 LBS | DIASTOLIC BLOOD PRESSURE: 63 MMHG

## 2024-12-06 VITALS
TEMPERATURE: 97.1 F | BODY MASS INDEX: 23.39 KG/M2 | WEIGHT: 116 LBS | DIASTOLIC BLOOD PRESSURE: 70 MMHG | OXYGEN SATURATION: 98 % | SYSTOLIC BLOOD PRESSURE: 110 MMHG | RESPIRATION RATE: 14 BRPM | HEART RATE: 49 BPM | HEIGHT: 59 IN

## 2024-12-06 DIAGNOSIS — R78.89 FINDING OF OTHER SPECIFIED SUBSTANCES, NOT NORMALLY FOUND IN BLOOD: ICD-10-CM

## 2024-12-06 DIAGNOSIS — Z98.890 OTHER SPECIFIED POSTPROCEDURAL STATES: Chronic | ICD-10-CM

## 2024-12-06 DIAGNOSIS — I49.9 CARDIAC ARRHYTHMIA, UNSPECIFIED: ICD-10-CM

## 2024-12-06 DIAGNOSIS — Z98.891 HISTORY OF UTERINE SCAR FROM PREVIOUS SURGERY: Chronic | ICD-10-CM

## 2024-12-06 LAB
ALBUMIN SERPL ELPH-MCNC: 3.9 G/DL — SIGNIFICANT CHANGE UP (ref 3.3–5.2)
ALBUMIN SERPL ELPH-MCNC: 4.3 G/DL
ALP BLD-CCNC: 123 U/L
ALP SERPL-CCNC: 122 U/L — HIGH (ref 40–120)
ALT FLD-CCNC: 13 U/L — SIGNIFICANT CHANGE UP
ALT SERPL-CCNC: 14 U/L
ANION GAP SERPL CALC-SCNC: 10 MMOL/L — SIGNIFICANT CHANGE UP (ref 5–17)
ANION GAP SERPL CALC-SCNC: 12 MMOL/L
APTT BLD: 30 SEC — SIGNIFICANT CHANGE UP (ref 24.5–35.6)
AST SERPL-CCNC: 11 U/L
AST SERPL-CCNC: 13 U/L — SIGNIFICANT CHANGE UP
BASOPHILS # BLD AUTO: 0.13 K/UL — SIGNIFICANT CHANGE UP (ref 0–0.2)
BASOPHILS NFR BLD AUTO: 1.2 % — SIGNIFICANT CHANGE UP (ref 0–2)
BILIRUB SERPL-MCNC: 0.4 MG/DL — SIGNIFICANT CHANGE UP (ref 0.4–2)
BILIRUB SERPL-MCNC: 0.5 MG/DL
BUN SERPL-MCNC: 26 MG/DL
BUN SERPL-MCNC: 27.3 MG/DL — HIGH (ref 8–20)
CALCIUM SERPL-MCNC: 9.6 MG/DL — SIGNIFICANT CHANGE UP (ref 8.4–10.5)
CALCIUM SERPL-MCNC: 9.7 MG/DL
CHLORIDE SERPL-SCNC: 104 MMOL/L
CHLORIDE SERPL-SCNC: 107 MMOL/L — SIGNIFICANT CHANGE UP (ref 96–108)
CO2 SERPL-SCNC: 22 MMOL/L — SIGNIFICANT CHANGE UP (ref 22–29)
CO2 SERPL-SCNC: 25 MMOL/L
CREAT SERPL-MCNC: 1.01 MG/DL — SIGNIFICANT CHANGE UP (ref 0.5–1.3)
CREAT SERPL-MCNC: 1.08 MG/DL
DIGOXIN SERPL-MCNC: 1.8 NG/ML — SIGNIFICANT CHANGE UP (ref 0.8–2)
DIGOXIN SERPL-MCNC: 2.7 NG/ML
DIGOXIN SERPL-MCNC: 2.8 NG/ML
EGFR: 48 ML/MIN/1.73M2
EGFR: 52 ML/MIN/1.73M2 — LOW
EOSINOPHIL # BLD AUTO: 0.56 K/UL — HIGH (ref 0–0.5)
EOSINOPHIL NFR BLD AUTO: 5.3 % — SIGNIFICANT CHANGE UP (ref 0–6)
ESTIMATED AVERAGE GLUCOSE: 128 MG/DL
GLUCOSE SERPL-MCNC: 94 MG/DL — SIGNIFICANT CHANGE UP (ref 70–99)
GLUCOSE SERPL-MCNC: 96 MG/DL
HBA1C MFR BLD HPLC: 6.1 %
HCT VFR BLD CALC: 41.3 % — SIGNIFICANT CHANGE UP (ref 34.5–45)
HGB BLD-MCNC: 13 G/DL — SIGNIFICANT CHANGE UP (ref 11.5–15.5)
IMM GRANULOCYTES NFR BLD AUTO: 0.5 % — SIGNIFICANT CHANGE UP (ref 0–0.9)
INR BLD: 1 RATIO — SIGNIFICANT CHANGE UP (ref 0.85–1.16)
LYMPHOCYTES # BLD AUTO: 2.3 K/UL — SIGNIFICANT CHANGE UP (ref 1–3.3)
LYMPHOCYTES # BLD AUTO: 21.9 % — SIGNIFICANT CHANGE UP (ref 13–44)
MAGNESIUM SERPL-MCNC: 2.1 MG/DL — SIGNIFICANT CHANGE UP (ref 1.6–2.6)
MCHC RBC-ENTMCNC: 28.3 PG — SIGNIFICANT CHANGE UP (ref 27–34)
MCHC RBC-ENTMCNC: 31.5 G/DL — LOW (ref 32–36)
MCV RBC AUTO: 89.8 FL — SIGNIFICANT CHANGE UP (ref 80–100)
MONOCYTES # BLD AUTO: 0.76 K/UL — SIGNIFICANT CHANGE UP (ref 0–0.9)
MONOCYTES NFR BLD AUTO: 7.3 % — SIGNIFICANT CHANGE UP (ref 2–14)
NEUTROPHILS # BLD AUTO: 6.68 K/UL — SIGNIFICANT CHANGE UP (ref 1.8–7.4)
NEUTROPHILS NFR BLD AUTO: 63.8 % — SIGNIFICANT CHANGE UP (ref 43–77)
NT-PROBNP SERPL-SCNC: 6466 PG/ML — HIGH (ref 0–300)
PHOSPHATE SERPL-MCNC: 3.6 MG/DL — SIGNIFICANT CHANGE UP (ref 2.4–4.7)
PLATELET # BLD AUTO: 281 K/UL — SIGNIFICANT CHANGE UP (ref 150–400)
POTASSIUM SERPL-MCNC: 5 MMOL/L — SIGNIFICANT CHANGE UP (ref 3.5–5.3)
POTASSIUM SERPL-SCNC: 4.9 MMOL/L
POTASSIUM SERPL-SCNC: 5 MMOL/L — SIGNIFICANT CHANGE UP (ref 3.5–5.3)
PROT SERPL-MCNC: 7.1 G/DL — SIGNIFICANT CHANGE UP (ref 6.6–8.7)
PROT SERPL-MCNC: 7.3 G/DL
PROTHROM AB SERPL-ACNC: 11.6 SEC — SIGNIFICANT CHANGE UP (ref 9.9–13.4)
RBC # BLD: 4.6 M/UL — SIGNIFICANT CHANGE UP (ref 3.8–5.2)
RBC # FLD: 13.8 % — SIGNIFICANT CHANGE UP (ref 10.3–14.5)
SODIUM SERPL-SCNC: 139 MMOL/L — SIGNIFICANT CHANGE UP (ref 135–145)
SODIUM SERPL-SCNC: 141 MMOL/L
TROPONIN T, HIGH SENSITIVITY RESULT: 38 NG/L — SIGNIFICANT CHANGE UP (ref 0–51)
TSH SERPL-ACNC: 6.35 UIU/ML
WBC # BLD: 10.48 K/UL — SIGNIFICANT CHANGE UP (ref 3.8–10.5)
WBC # FLD AUTO: 10.48 K/UL — SIGNIFICANT CHANGE UP (ref 3.8–10.5)

## 2024-12-06 PROCEDURE — 99215 OFFICE O/P EST HI 40 MIN: CPT

## 2024-12-06 PROCEDURE — 99223 1ST HOSP IP/OBS HIGH 75: CPT | Mod: GC

## 2024-12-06 PROCEDURE — 99497 ADVNCD CARE PLAN 30 MIN: CPT | Mod: GC,25

## 2024-12-06 PROCEDURE — 93000 ELECTROCARDIOGRAM COMPLETE: CPT

## 2024-12-06 PROCEDURE — 99291 CRITICAL CARE FIRST HOUR: CPT

## 2024-12-06 RX ORDER — DOCUSATE SODIUM 100 MG
1 CAPSULE ORAL
Refills: 0 | DISCHARGE

## 2024-12-06 RX ORDER — SPIRONOLACTONE 25 MG
25 TABLET ORAL DAILY
Refills: 0 | Status: DISCONTINUED | OUTPATIENT
Start: 2024-12-06 | End: 2024-12-08

## 2024-12-06 RX ORDER — ACETAMINOPHEN 500MG 500 MG/1
650 TABLET, COATED ORAL EVERY 6 HOURS
Refills: 0 | Status: DISCONTINUED | OUTPATIENT
Start: 2024-12-06 | End: 2024-12-08

## 2024-12-06 RX ORDER — MAGNESIUM, ALUMINUM HYDROXIDE 200-225/5
30 SUSPENSION, ORAL (FINAL DOSE FORM) ORAL EVERY 4 HOURS
Refills: 0 | Status: DISCONTINUED | OUTPATIENT
Start: 2024-12-06 | End: 2024-12-08

## 2024-12-06 RX ORDER — HEPARIN SODIUM,PORCINE 1000/ML
5000 VIAL (ML) INJECTION EVERY 8 HOURS
Refills: 0 | Status: DISCONTINUED | OUTPATIENT
Start: 2024-12-06 | End: 2024-12-08

## 2024-12-06 RX ORDER — FAMOTIDINE 20 MG/1
20 TABLET, FILM COATED ORAL DAILY
Refills: 0 | Status: DISCONTINUED | OUTPATIENT
Start: 2024-12-06 | End: 2024-12-08

## 2024-12-06 RX ORDER — OVINE DIGOXIN IMMUNE FAB 40 MG/1
40 INJECTION, POWDER, FOR SOLUTION INTRAVENOUS ONCE
Refills: 0 | Status: COMPLETED | OUTPATIENT
Start: 2024-12-06 | End: 2024-12-06

## 2024-12-06 RX ORDER — ONDANSETRON HYDROCHLORIDE 4 MG/1
4 TABLET, FILM COATED ORAL EVERY 8 HOURS
Refills: 0 | Status: DISCONTINUED | OUTPATIENT
Start: 2024-12-06 | End: 2024-12-08

## 2024-12-06 RX ORDER — SENNOSIDES 8.6 MG
2 TABLET ORAL AT BEDTIME
Refills: 0 | Status: DISCONTINUED | OUTPATIENT
Start: 2024-12-06 | End: 2024-12-08

## 2024-12-06 RX ORDER — LOSARTAN POTASSIUM 100 MG/1
25 TABLET, FILM COATED ORAL DAILY
Refills: 0 | Status: DISCONTINUED | OUTPATIENT
Start: 2024-12-06 | End: 2024-12-08

## 2024-12-06 RX ORDER — FUROSEMIDE 40 MG/1
20 TABLET ORAL
Refills: 0 | Status: DISCONTINUED | OUTPATIENT
Start: 2024-12-06 | End: 2024-12-08

## 2024-12-06 RX ORDER — MIRTAZAPINE 15 MG/1
1 TABLET, FILM COATED ORAL
Refills: 0 | DISCHARGE

## 2024-12-06 RX ORDER — MIRTAZAPINE 15 MG/1
15 TABLET, FILM COATED ORAL AT BEDTIME
Refills: 0 | Status: DISCONTINUED | OUTPATIENT
Start: 2024-12-06 | End: 2024-12-08

## 2024-12-06 RX ORDER — LEVOTHYROXINE SODIUM 150 MCG
50 TABLET ORAL DAILY
Refills: 0 | Status: DISCONTINUED | OUTPATIENT
Start: 2024-12-06 | End: 2024-12-08

## 2024-12-06 RX ORDER — CYANOCOBALAMIN/FOLIC AC/VIT B6 1-2.2-25MG
1 TABLET ORAL DAILY
Refills: 0 | Status: DISCONTINUED | OUTPATIENT
Start: 2024-12-06 | End: 2024-12-08

## 2024-12-06 RX ORDER — ACETAMINOPHEN, DIPHENHYDRAMINE HCL, PHENYLEPHRINE HCL 325; 25; 5 MG/1; MG/1; MG/1
3 TABLET ORAL AT BEDTIME
Refills: 0 | Status: DISCONTINUED | OUTPATIENT
Start: 2024-12-06 | End: 2024-12-08

## 2024-12-06 RX ADMIN — OVINE DIGOXIN IMMUNE FAB 50 MILLIGRAM(S): 40 INJECTION, POWDER, FOR SOLUTION INTRAVENOUS at 17:23

## 2024-12-06 RX ADMIN — OVINE DIGOXIN IMMUNE FAB 40 MILLIGRAM(S): 40 INJECTION, POWDER, FOR SOLUTION INTRAVENOUS at 18:23

## 2024-12-06 NOTE — CONSULT NOTE ADULT - PROVIDER SPECIALTY LIST ADULT
10/24/22    Eyal Childers  
Cardiology
Chema Barrett MD as PCP - [facility] Empaneled Provider  Chris Odom 
Toxicology (Non-Face-To-Face)

## 2024-12-06 NOTE — ED PROVIDER NOTE - PROGRESS NOTE DETAILS
Trish Downey MD, Attending  sp digibind, with mild HR improvement to 40's. Pt Aox3, stable BP will admit.

## 2024-12-06 NOTE — ED ADULT NURSE NOTE - NSFALLHARMRISKINTERV_ED_ALL_ED

## 2024-12-06 NOTE — H&P ADULT - NSHPPHYSICALEXAM_GEN_ALL_CORE
VITALS:   T(C): 35.6 (12-06-24 @ 13:20), Max: 35.6 (12-06-24 @ 13:20)  HR: 57 (12-06-24 @ 22:23) (38 - 57)  BP: 143/76 (12-06-24 @ 22:23) (132/64 - 189/54)  RR: 18 (12-06-24 @ 22:23) (16 - 20)  SpO2: 100% (12-06-24 @ 22:23) (96% - 100%)    GENERAL: NAD, elderly lady lying in bed comfortably  HEAD:  Atraumatic, Normocephalic  EYES: EOMI, PERRLA, conjunctiva and sclera clear  ENT: Moist mucous membranes  NECK: Supple, No JVD  CHEST/LUNG: Clear to auscultation bilaterally; No rales, rhonchi, wheezing, or rubs. Unlabored respirations  HEART: Bradycardic; No obvious murmurs  ABDOMEN: BSx4; Soft, nontender, nondistended  EXTREMITIES:  2+ Peripheral Pulses, brisk capillary refill. No clubbing, cyanosis, or edema  NERVOUS SYSTEM:  A&Ox3, no focal deficits   SKIN: No rashes or lesions

## 2024-12-06 NOTE — H&P ADULT - HISTORY OF PRESENT ILLNESS
89/F w/ PMHx chronic A-fib (not on anticoagulation due to a hemorrhagic stroke in 2000), PAD, HTN, hypothyroidism and HFpEF presented to [facility] ED following an outpatient lab result showing an elevated digoxin level of 2.8. On ED arrival, she was found to be in slow A-fib with HR in the mid-30s. Repeat digoxin level was 1.8, with the last dose given on 12/5 AM, and K was 5.0. Cardiology and toxicology were consulted due to potential digoxin toxicity. She received 1 vial of Digibind, which improved her HR to 50, returning her to baseline. The patient reports no symptoms such as nausea, vomiting, vision changes, or palpitations, and her A-fib is asymptomatic without dizziness, syncope, chest pain, or SOB.

## 2024-12-06 NOTE — ED ADULT NURSE NOTE - NS ED NURSE RECORD ANOTHER HT AND WT
[Dear  ___] : Dear  [unfilled], [Consult Letter:] : I had the pleasure of evaluating your patient, [unfilled]. [Please see my note below.] : Please see my note below. [Consult Closing:] : Thank you very much for allowing me to participate in the care of this patient.  If you have any questions, please do not hesitate to contact me. [Sincerely,] : Sincerely, [FreeTextEntry2] : Ajit Trammell MD [FreeTextEntry3] : Subha Jett MD\par Otolaryngology - Head & Neck Surgery\par  Yes

## 2024-12-06 NOTE — PHARMACOTHERAPY INTERVENTION NOTE - COMMENTS
Bradycardia
Spoke to family at bedside to obtain medication list. Patient on digoxin 250 mCg once daily, last dose 12/5 @ 9am. Outpatient Medication Review updated.    HOME MEDICATIONS:  aspirin 81 mg oral tablet, chewable: 1 tab(s) orally once a day (06 Dec 2024 18:04)  cholecalciferol oral tablet: 1000 unit(s) orally once a day (06 Dec 2024 20:07)  digoxin 250 mcg (0.25 mg) oral tablet: 1 tab(s) orally once a day (06 Dec 2024 20:07)  docusate sodium 100 mg oral capsule: 1 cap(s) orally 2 times a day (06 Dec 2024 18:11)  famotidine 20 mg oral tablet: 1 tab(s) orally once a day (at bedtime) (06 Dec 2024 18:04)  furosemide 20 mg oral tablet: 1 tab(s) orally 3 times a week Monday/Wednesday/Friday (06 Dec 2024 18:04)  losartan 25 mg oral tablet: 1 tab(s) orally once a day (06 Dec 2024 18:04)  metoprolol succinate 25 mg oral tablet, extended release: 1 tab(s) orally once a day (06 Dec 2024 18:11)  mirtazapine 15 mg oral tablet: 1 tab(s) orally once a day (06 Dec 2024 18:11)  Multiple Vitamins oral tablet: 1 tab(s) orally once a day (06 Dec 2024 18:04)  spironolactone 25 mg oral tablet: 1 tab(s) orally once a day (06 Dec 2024 18:04)  Synthroid 50 mcg (0.05 mg) oral tablet: 1 tab(s) orally once a day (06 Dec 2024 18:04)

## 2024-12-06 NOTE — ED PROVIDER NOTE - CLINICAL SUMMARY MEDICAL DECISION MAKING FREE TEXT BOX
89-year-old female with history of A-fib/heart failure on digoxin presenting with elevated digoxin levels on outpatient labs, EKG showing slow A-fib with heart rate in the mid 30s suggestive of digoxin toxicity.  Spoke with on-call toxicology fellow, recommending giving 1 vial of Digibind.  Patient with improvement of heart rates from high 30s/low 40s to mid 50s although labile. Cardiology consult called, will observe pt. Hold Digoxin and Metoprolol, admitted to Tele. 89-year-old female with history of A-fib/heart failure on digoxin presenting with elevated digoxin levels on outpatient labs, EKG showing slow A-fib with heart rate in the mid 30s suggestive of digoxin toxicity.  Spoke with on-call toxicology fellow, recommending giving 1 vial of Digibind.  Patient with improvement of heart rates from high 30s/low 40s to mid 50s although labile. Cardiology consult called, will observe pt. Hold Digoxin and Metoprolol, admitted to Tele.   ---------  Trish Downey MD, Attending  89-yo F hx of Afib on dig, HTN, CVA, PAD, CHF, pw elevated dig levels. Per granddaughter at bedside, pt's dig level was elevated on tuesday on routine testing, repeated on thursday and pt was directed to present to the ED for elevated level of 2.8. In the ED, pt states "feels fine" and wants to be "in her garden:" Pt HR noted to be mid 30's. -160s.   Gen: NAD   Head: NC/AT  Neck: trachea midline  Resp:  No distress, CTAb/l  Card: + bradycardic, irregularly irregular, stable BP  Ext: no deformities  Neuro:  A&Ox3, appears non focal  Skin:  Warm and dry as visualized  Psych:  Normal affect and mood    ddx includes, but is not limited to the following: digoxin toxicity, electrolyte derangement, acute CHF exacerbation, new onset structural heart disease.   plan: cardiac labs, dig level, cards consult, tox consult, reassess, +/- digibind anticipate admission on tele.   update: see progress notes

## 2024-12-06 NOTE — H&P ADULT - TIME BILLING
Labs/Imaging/notes reviewed. med rec confirmed. Orders placed. cardio consulted appreciate reccs. toxicology consulted appreciate reccs.     Time spent on medical decision making/management separate from time spent on goals of care and time spent on resident education/teaching.

## 2024-12-06 NOTE — ED PROVIDER NOTE - ATTENDING CONTRIBUTION TO CARE
Patient was critically ill with a high probability of imminent or life threatening deterioration.  I have performed direct patient care (not related to procedure), additional history taking, interpretation of diagnostic studies, documentation, consultation with other physicians, telephone consultation with the patient's family.   I have personally and independently provided the amount of critical care time documented below excluding time spent on separate procedures.  critical care time: 45 mins    Dr. Downey: I personally saw the patient with the resident and performed a substantive portion of the visit. I performed a face to face bedside interview with patient regarding history of present illness, review of symptoms and past medical history. I completed an independent physical exam and all aspects of medical decision making. I have discussed patient's plan of care with resident. I agree with note as stated above, having amended the EMR as needed to reflect my findings. This includes HISTORY OF PRESENT ILLNESS, HIV, PAST MEDICAL/SURGICAL/FAMILY/SOCIAL HISTORY, ALLERGIES AND HOME MEDICATIONS, ROS, PHYSICAL EXAM, MEDICAL DECISION MAKING and any PROGRESS NOTES during the time I functioned as the attending physician for this patient.  SEE MDM

## 2024-12-06 NOTE — ED ADULT TRIAGE NOTE - CHIEF COMPLAINT QUOTE
sent in by PCP for "low heart rate 38". [facility] reports called with abnormal digoxin blood level

## 2024-12-06 NOTE — ED PROVIDER NOTE - OBJECTIVE STATEMENT
89-year-old female with past medical history of hypertension, CVA, atrial fibrillation, peripheral arterial disease, heart failure presenting to the emergency room after reported elevated digoxin levels.  Patient herself has no complaints–denies chest pain, shortness of breath, back pain, abdominal pain, leg pain.  Per granddaughter at bedside patient's digoxin levels were checked earlier this week including levels of 2.8 seen yesterday. 89-year-old female with past medical history of hypertension, CVA, atrial fibrillation on digoxin, peripheral arterial disease, heart failure presenting to the emergency room after reported elevated digoxin levels. Patient herself has no complaints–denies chest pain, shortness of breath, back pain, abdominal pain, leg pain.  Per granddaughter at bedside patient's digoxin levels were checked earlier this week including levels of 2.8 seen yesterday. In the ED, pt is in slow Afib, HR 30's.

## 2024-12-06 NOTE — ED ADULT NURSE NOTE - CHIEF COMPLAINT QUOTE
sent in by PCP for "low heart rate 38". [facility] reports called with abnormal digoxin blood level

## 2024-12-06 NOTE — H&P ADULT - ASSESSMENT
Assessment:  89/F w/ PMHx chronic A-fib (not on anticoagulation due to a hemorrhagic stroke in 2000), PAD, HTN, hypothyroidism, and HFpEF presented to [facility] ED for elevated digoxin level (2.8).  She received 1 vial of Digibind with improvement in heart rate.  Admitted to medicine for acute digoxin toxicity.    Plan:  #Acute digoxin toxicity  – Admit to telemetry  – Digoxin level 1.8  – EKG: A-fib with slow ventricular response with ventricular escape complexes  – Denies vision changes, N/B/D, palpitations, chest pain  – S/p Digibind x 1  – Hold Toprol-XL & digoxin  – Monitor potassium level  – Cardiology & toxicology notes appreciated      #HFpEF, HTN  – Hold Toprol-XL & digoxin  – C/w losartan, Aldactone, Lasix  – Monitor telemetry    #Hypothyroidism  –C/w levothyroxine 50 mcg QD    #PAD  – C/w aspirin 81 mg QD    #Constipation  – C/w senna (interchanged for Colace)    #Depression  – C/w mirtazapine 15 mg QD      DVT PPx: SC heparin  Diet: DASH  CODE STATUS: DNR/DNI (trial of NIV), MOLST completed  Dispo: Likely home in next 48 hours if stable.  Patient is independent at baseline. Assessment:  89/F w/ PMHx chronic A-fib (not on anticoagulation due to a hemorrhagic stroke in 2000), PAD, HTN, hypothyroidism, and HFpEF presented to [facility] ED for elevated digoxin level (2.8).  She received 1 vial of Digibind with improvement in heart rate.  Admitted to medicine for acute digoxin toxicity.    Plan:  #Acute digoxin toxicity  – Admit to telemetry  – Outpt Digoxin level 2.8 repeat inpt Digoxin level 1.8  – EKG: A-fib with slow ventricular response with ventricular escape complexes, flattened twaves abnormal EKG  – Denies vision changes, N/B/D, palpitations, chest pain  – S/p Digibind x 1  – Hold Toprol-XL & digoxin  – Monitor potassium level  – Cardiology & toxicology notes appreciated    #HFpEF, HTN  – Hold Toprol-XL & digoxin  – C/w losartan, Aldactone, Lasix  – Monitor telemetry    #Hypothyroidism  –C/w levothyroxine 50 mcg QD    #PAD  – C/w aspirin 81 mg QD    #Constipation  – C/w senna (interchanged for Colace)    #Depression  – C/w mirtazapine 15 mg QD    DVT PPx: SC heparin  Diet: DASH  CODE STATUS: DNR/DNI (trial of NIV), MOLST completed  Dispo: Likely home in next 48 hours if stable.  Patient is independent at baseline.

## 2024-12-06 NOTE — CONSULT NOTE ADULT - ATTENDING COMMENTS
Med Tox Attending MD Chapa phone consultation:  patient encounter discussed at-length with the fellow, and I agree with the impression & plan.

## 2024-12-06 NOTE — H&P ADULT - ATTENDING COMMENTS
Changes made to above otherwise agree with resident documentation after edits, in brief:    Assessment:  89/F w/ PMHx chronic A-fib (not on anticoagulation due to a hemorrhagic stroke in 2000), PAD, HTN, hypothyroidism, and HFpEF presented to [facility] ED for elevated digoxin level (2.8).  She received 1 vial of Digibind with improvement in heart rate.  Admitted to medicine for acute digoxin toxicity.    Plan:  #Acute digoxin toxicity  – Admit to telemetry  – Outpt Digoxin level 2.8 repeat inpt Digoxin level 1.8  – EKG: A-fib with slow ventricular response with ventricular escape complexes, flattened twaves abnormal EKG  – Denies vision changes, N/B/D, palpitations, chest pain  – S/p Digibind x 1  – Hold Toprol-XL & digoxin  – Monitor potassium level  – Cardiology & toxicology notes appreciated    #HFpEF, HTN  – Hold Toprol-XL & digoxin  – C/w losartan, Aldactone, Lasix  – Monitor telemetry    #Hypothyroidism  –C/w levothyroxine 50 mcg QD    #PAD  – C/w aspirin 81 mg QD    #Constipation  – C/w senna (interchanged for Colace)    #Depression  – C/w mirtazapine 15 mg QD    DVT PPx: SC heparin  Diet: DASH  CODE STATUS: DNR/DNI (trial of NIV), MOLST completed  Dispo: Likely home in next 48 hours if stable.  Patient is independent at baseline.

## 2024-12-06 NOTE — CONSULT NOTE ADULT - PROBLEM SELECTOR RECOMMENDATION 9
Emergency Medicine / Medical Toxicology Attending MD Chapa:    89-year-old female sent to the ED by PMD who identified a digoxin level of 1.8 on outpatient labs.  Patient endorses that she would not of come to the ED had she not been instructed to by her PMD.  In the ED she was noted to be bradycardic, heart rate in the 30s.  ECG showed atrial fibrillation, with slow ventricular response, rate 38, no scooped ST segments appreciated.    Recommendations:  A-fib with slow ventricular response with bradycardia in the context of an elevated dig level is an indication for DigiFab.  Based on the patient's weight would administer 1 vial DigiFab, reassess heart rate.    If patient's heart rate does not improve after digoxin GABBY, would likely admit for electrophysiology eval of slow afib.

## 2024-12-06 NOTE — H&P ADULT - CONVERSATION DETAILS
Discussed overall goals of care including advanced directives with patient & granddaughter at bedside. During this discussion we reviewed the current diagnosis, treatment plan, and likely prognosis. An explanation of advanced directives and MOLST was provided.  After this conversation decision was made by the patient to continue DNR/DNI with trial of NIV. MOLST form completed, signed, and placed in chart.     Above was reviewed with attending physician Dr. Pan. Discussed overall goals of care including advanced directives with patient & granddaughter at bedside. During this discussion we reviewed the current diagnosis, treatment plan, and likely prognosis. An explanation of advanced directives and MOLST was provided.  After this conversation decision was made by the patient to continue DNR/DNI with trial of NIV. MOLST form completed, signed, and placed in chart.     Above was reviewed with attending physician Dr. Pan.    Time spent on medical decision making/management separate from time spent on goals of care and time spent on resident education/teaching.

## 2024-12-06 NOTE — CONSULT NOTE ADULT - SUBJECTIVE AND OBJECTIVE BOX
[facility], THE HEART CENTER                                   [address] [place] [address]                                                      PHONE: [phone]                                                         FAX: [phone]  [email or web address]  ---------------------------------------------------------------------------------------------------------------------------------    Reason for Consult: AF     HPI:  TERESA ULLOA is an 89y Female HTN RBBB CVA remote chronic AF not on AC due to bleeding risk hx of ICH HFpEF lasted TTE 2022 WMA LV 45% moderate PHTN 54 mmHg mild to moderate and PET CT  LV EF 70% coronary calcifications without ischemia or scar now presents to [facility] due to asymptomatic AF with SVR Dig level 1.8 with stable labs.  HD stable now without any chest pain orthopnea or PND.      PAST MEDICAL & SURGICAL HISTORY:  GERD (gastroesophageal reflux disease)      Hypothyroidism      Arthritis      HTN (hypertension)      H/O:       H/O hernia repair          Macrobid (Other)      MEDICATIONS  (STANDING):    MEDICATIONS  (PRN):      Social History:  Cigarettes:          none           Alchohol:       none           Illicit Drug Abuse:  none     ROS: Negative other than as mentioned in HPI.    Vital Signs Last 24 Hrs  T(C): 35.6 (06 Dec 2024 13:20), Max: 35.6 (06 Dec 2024 13:20)  T(F): 96 (06 Dec 2024 13:20), Max: 96 (06 Dec 2024 13:20)  HR: 40 (06 Dec 2024 15:35) (38 - 41)  BP: 180/56 (06 Dec 2024 15:35) (165/63 - 189/54)  BP(mean): 87 (06 Dec 2024 15:35) (87 - 87)  RR: 16 (06 Dec 2024 15:35) (16 - 18)  SpO2: 100% (06 Dec 2024 15:35) (96% - 100%)    Parameters below as of 06 Dec 2024 15:35  Patient On (Oxygen Delivery Method): room air      ICU Vital Signs Last 24 Hrs  TERESA ULLOA  I&O's Detail    I&O's Summary    Drug Dosing Weight  TERESA LEONJOVANA      PHYSICAL EXAM:  General: Appears well developed, alert and cooperative.  HEENT: Head; normocephalic, atraumatic.  Eyes: Pupils reactive, cornea wnl.  Neck: Supple, no nodes adenopathy, no NVD or carotid bruit or thyromegaly.  CARDIOVASCULAR: Normal S1 and S2, 1/6 murmur, rub, gallop or lift.   LUNGS: No rales, rhonchi or wheeze. Normal breath sounds bilaterally.  ABDOMEN: Soft, nontender without mass or organomegaly. bowel sounds normoactive.  EXTREMITIES: No clubbing, cyanosis or edema. Distal pulses wnl.   SKIN: warm and dry with normal turgor.  NEURO: Alert/oriented x 3/normal motor exam. No pathologic reflexes.    PSYCH: normal affect.        LABS:                        13.0   10.48 )-----------( 281      ( 06 Dec 2024 14:45 )             41.3     12-    139  |  107  |  27.3[H]  ----------------------------<  94  5.0   |  22.0  |  1.01    Ca    9.6      06 Dec 2024 14:45  Phos  3.6     12-  Mg     2.1     12-    TPro  7.1  /  Alb  3.9  /  TBili  0.4  /  DBili  x   /  AST  13  /  ALT  13  /  AlkPhos  122[H]  12-    TERESA ULLOA      PT/INR - ( 06 Dec 2024 14:45 )   PT: 11.6 sec;   INR: 1.00 ratio         PTT - ( 06 Dec 2024 14:45 )  PTT:30.0 sec  Urinalysis Basic - ( 06 Dec 2024 14:45 )    Color: x / Appearance: x / SG: x / pH: x  Gluc: 94 mg/dL / Ketone: x  / Bili: x / Urobili: x   Blood: x / Protein: x / Nitrite: x   Leuk Esterase: x / RBC: x / WBC x   Sq Epi: x / Non Sq Epi: x / Bacteria: x        RADIOLOGY & ADDITIONAL STUDIES:    INTERPRETATION OF TELEMETRY (personally reviewed):    ECG: AF SVR none specific changes     ECHO:  < from: TTE Echo Complete w/o Contrast w/ Doppler (21 @ 15:31) >  Summary:   1. Endocardial visualization was enhanced with intravenous echo contrast.   2. Severely enlarged left atrium.   3. Segmental wall motion abnormalities in distal LAD territory. 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   4. Left ventricular ejection fraction, by visual estimation, is 35 to   40%.   5. Severelyenlarged right atrium.   6. Mildly enlarged right ventricle. RV systolic function is normal.   7. Mild mitral valve regurgitation.   8. Mild tricuspid regurgitation.   9. Trivial pericardial effusion.  10. Moderate pleural effusion in both left and right lateral regions.  11. [facility] cardiology oncall team informed. Dr. welch    < end of copied text >      Assessment and Plan:  In summary, TERESA ULLOA is an 89y Female with past medical history significant for HTN RBBB CVA remote chronic AF not on AC due to bleeding risk hx of ICH HFpEF lasted TTE 2022 WMA LV 45% moderate PHTN 54 mmHg mild to moderate and PET CT  LV EF 70% coronary calcifications without ischemia or scar now presents to [facility] due to asymptomatic AF with SVR Dig level 1.8 with stable labs.  HD stable now without any chest pain orthopnea or PND.      Asymptomatic AF with SVR     Plan  Continue to monitor   HOLD Toprol XL 25 mg daily   HOLD Dig 125 mcg daily   Continue other CV medications     D/W with ER       
MEDICAL TOXICOLOGY CONSULT    HPI:  89-year-old female with past medical history of hypertension, CVA, atrial fibrillation, peripheral arterial disease, heart failure presenting to the emergency room after reported elevated digoxin levels.  Patient herself has no complaints–denies chest pain, shortness of breath, back pain, abdominal pain, leg pain.  Per granddaughter at bedside patient's digoxin levels were checked earlier this week including levels of 2.8 seen yesterday.     On arrival to the ED, the patient is found to be in slow A-fib to 38 on EKG and in the mid-30s on telemetry monitor. Pt is reported to have a baseline HR 50-60s. No recent illness. Asymptomatic in the ED.     PAST MEDICAL & SURGICAL HISTORY:  GERD (gastroesophageal reflux disease)      Hypothyroidism      Arthritis      HTN (hypertension)      H/O:       H/O hernia repair          MEDICATION HISTORY:  digoxin immune GABBY [DigiFab] Infusion 40 milliGRAM(s) IV Infusion once      FAMILY HISTORY:  FH: heart disease (Father)        REVIEW OF SYSTEMS:   As per ED provider      Vital Signs Last 24 Hrs  T(C): 35.6 (06 Dec 2024 13:20), Max: 35.6 (06 Dec 2024 13:20)  T(F): 96 (06 Dec 2024 13:20), Max: 96 (06 Dec 2024 13:20)  HR: 40 (06 Dec 2024 15:35) (38 - 41)  BP: 180/56 (06 Dec 2024 15:35) (165/63 - 189/54)  BP(mean): 87 (06 Dec 2024 15:35) (87 - 87)  RR: 16 (06 Dec 2024 15:35) (16 - 18)  SpO2: 100% (06 Dec 2024 15:35) (96% - 100%)    Parameters below as of 06 Dec 2024 15:35  Patient On (Oxygen Delivery Method): room air        SIGNIFICANT LABORATORY STUDIES:                        13.0   10.48 )-----------( 281      ( 06 Dec 2024 14:45 )             41.3       12-    139  |  107  |  27.3[H]  ----------------------------<  94  5.0   |  22.0  |  1.01    Ca    9.6      06 Dec 2024 14:45  Phos  3.6     12-06  Mg     2.1         TPro  7.1  /  Alb  3.9  /  TBili  0.4  /  DBili  x   /  AST  13  /  ALT  13  /  AlkPhos  122[H]        PT/INR - ( 06 Dec 2024 14:45 )   PT: 11.6 sec;   INR: 1.00 ratio         PTT - ( 06 Dec 2024 14:45 )  PTT:30.0 sec    Gluc: 94 mg/dL   Anion Gap: 10  @ 14:45  Digoxin Level:  1.8   @ 14:45

## 2024-12-06 NOTE — ED PROVIDER NOTE - PHYSICAL EXAMINATION
General: well appearing, NAD  Head: NC, AT  EENT: EOMI, no scleral icterus  Cardiac: bradycardic and irregular rhythm, no apparent murmurs, no lower extremity edema  Respiratory: CTABL, no respiratory distress   Abdomen: soft, ND, NT, nonperitonitic  MSK/Vascular: full ROM, soft compartments, warm extremities, 2+ peripheral pulses b/l  Neuro: AAOx3, sensation to light touch intact  Psych: calm, cooperative

## 2024-12-06 NOTE — CONSULT NOTE ADULT - ASSESSMENT
89 year old female with hx of CHF and Afib on Digoxin here with elevated Digoxin level on outpatient lab. Asymptomatic otherwise and at her mental baseline as per family member. Pt found to be in a slow afib rhythm at a rate of 38 with documented HR in the mid-30s on telemetry monitor. Digoxin level returned at 1.8 with last dose given last night. Given slow A fib will recommend digoxin specific immune agapito as below.     Recommendations  - Supportive care, based on Dig concentration of 1.8 and weight of 52.6 kg, please administer 1 vial of digoxin immune agapito and continue to monitor HR  - If return to baseline HR after 1 vial and patient remains asymptomatic, patient can be instructed to skip next 2 doses of Digoxin and can be cleared from an acute toxicologic standpoint with cardiology follow up  - Further medical and/or psychiatric care per primary team    Thank you for involving us in the care of this patient. Assessment and plan discussed with toxicology attending Dr. Dominic Chapa. Please do not hesitate to reach out to the toxicology team for any further questions or concerns.    The On-Call Toxicology Fellow can be reached 24/7 via Pager #[phone]  Please send a 10 digit call back # as [place] cover multiple hospitals    Mekhi Palacios MD  Toxicology Fellow  PGY-5

## 2024-12-06 NOTE — ED ADULT NURSE NOTE - OBJECTIVE STATEMENT
pt A&Ox4 grand daughter she was sent in from PCP for elevated digoxin levels no complaints at this time pending MD assessment for next step in plan of care pt A&Ox2 with grand daughter she was sent in from PCP for elevated digoxin levels of 2. Offers no complaints at this time pending MD assessment for next step in plan of care. Cardiac monitoring and pulse oximetry initiated. HR 30-40 bpm.Placed on Defib pads.  Denies lightheadedness, SOB, CP. Reports "I feel fine". NAD noted, respirations even and unlabored.  Safety precautions in place.  Plan of care explained, pt verbalized understanding.

## 2024-12-07 LAB
ANION GAP SERPL CALC-SCNC: 11 MMOL/L — SIGNIFICANT CHANGE UP (ref 5–17)
BUN SERPL-MCNC: 25.3 MG/DL — HIGH (ref 8–20)
CALCIUM SERPL-MCNC: 9 MG/DL — SIGNIFICANT CHANGE UP (ref 8.4–10.5)
CHLORIDE SERPL-SCNC: 109 MMOL/L — HIGH (ref 96–108)
CO2 SERPL-SCNC: 20 MMOL/L — LOW (ref 22–29)
CREAT SERPL-MCNC: 0.82 MG/DL — SIGNIFICANT CHANGE UP (ref 0.5–1.3)
EGFR: 67 ML/MIN/1.73M2 — SIGNIFICANT CHANGE UP
GLUCOSE SERPL-MCNC: 87 MG/DL — SIGNIFICANT CHANGE UP (ref 70–99)
HCT VFR BLD CALC: 34.9 % — SIGNIFICANT CHANGE UP (ref 34.5–45)
HGB BLD-MCNC: 11.3 G/DL — LOW (ref 11.5–15.5)
MAGNESIUM SERPL-MCNC: 1.9 MG/DL — SIGNIFICANT CHANGE UP (ref 1.6–2.6)
MCHC RBC-ENTMCNC: 28.6 PG — SIGNIFICANT CHANGE UP (ref 27–34)
MCHC RBC-ENTMCNC: 32.4 G/DL — SIGNIFICANT CHANGE UP (ref 32–36)
MCV RBC AUTO: 88.4 FL — SIGNIFICANT CHANGE UP (ref 80–100)
PLATELET # BLD AUTO: 259 K/UL — SIGNIFICANT CHANGE UP (ref 150–400)
POTASSIUM SERPL-MCNC: 4 MMOL/L — SIGNIFICANT CHANGE UP (ref 3.5–5.3)
POTASSIUM SERPL-SCNC: 4 MMOL/L — SIGNIFICANT CHANGE UP (ref 3.5–5.3)
RBC # BLD: 3.95 M/UL — SIGNIFICANT CHANGE UP (ref 3.8–5.2)
RBC # FLD: 13.8 % — SIGNIFICANT CHANGE UP (ref 10.3–14.5)
SODIUM SERPL-SCNC: 140 MMOL/L — SIGNIFICANT CHANGE UP (ref 135–145)
TSH SERPL-MCNC: 5.36 UIU/ML — HIGH (ref 0.27–4.2)
WBC # BLD: 9.41 K/UL — SIGNIFICANT CHANGE UP (ref 3.8–10.5)
WBC # FLD AUTO: 9.41 K/UL — SIGNIFICANT CHANGE UP (ref 3.8–10.5)

## 2024-12-07 PROCEDURE — 99232 SBSQ HOSP IP/OBS MODERATE 35: CPT

## 2024-12-07 RX ORDER — INFLUENZA VIRUS VACCINE 15; 15; 15; 15 UG/.5ML; UG/.5ML; UG/.5ML; UG/.5ML
0.5 SUSPENSION INTRAMUSCULAR ONCE
Refills: 0 | Status: DISCONTINUED | OUTPATIENT
Start: 2024-12-07 | End: 2024-12-08

## 2024-12-07 RX ORDER — RISPERIDONE 4 MG/1
0.5 TABLET ORAL ONCE
Refills: 0 | Status: COMPLETED | OUTPATIENT
Start: 2024-12-07 | End: 2024-12-07

## 2024-12-07 RX ADMIN — Medication 5000 UNIT(S): at 14:37

## 2024-12-07 RX ADMIN — Medication 5000 UNIT(S): at 21:38

## 2024-12-07 RX ADMIN — MIRTAZAPINE 15 MILLIGRAM(S): 15 TABLET, FILM COATED ORAL at 21:40

## 2024-12-07 RX ADMIN — RISPERIDONE 0.5 MILLIGRAM(S): 4 TABLET ORAL at 17:57

## 2024-12-07 RX ADMIN — Medication 81 MILLIGRAM(S): at 08:42

## 2024-12-07 RX ADMIN — Medication 50 MICROGRAM(S): at 05:54

## 2024-12-07 RX ADMIN — ACETAMINOPHEN 500MG 650 MILLIGRAM(S): 500 TABLET, COATED ORAL at 21:38

## 2024-12-07 RX ADMIN — Medication 25 MILLIGRAM(S): at 08:42

## 2024-12-07 RX ADMIN — Medication 2 TABLET(S): at 21:38

## 2024-12-07 RX ADMIN — LOSARTAN POTASSIUM 25 MILLIGRAM(S): 100 TABLET, FILM COATED ORAL at 08:42

## 2024-12-07 RX ADMIN — FAMOTIDINE 20 MILLIGRAM(S): 20 TABLET, FILM COATED ORAL at 08:42

## 2024-12-07 RX ADMIN — Medication 5000 UNIT(S): at 05:53

## 2024-12-07 RX ADMIN — Medication 1 TABLET(S): at 08:42

## 2024-12-07 NOTE — PROGRESS NOTE ADULT - SUBJECTIVE AND OBJECTIVE BOX
Patient is a 89y old  Female who presents with a chief complaint of AF (07 Dec 2024 09:41)      SUBJECTIVE / OVERNIGHT EVENTS: Seen and examined. Feels ok. Denies chest pain, SOB, abd pain, N/V, fever, chills.     MEDICATIONS  (STANDING):  aspirin  chewable 81 milliGRAM(s) Oral daily  famotidine    Tablet 20 milliGRAM(s) Oral daily  furosemide    Tablet 20 milliGRAM(s) Oral <User Schedule>  heparin   Injectable 5000 Unit(s) SubCutaneous every 8 hours  influenza  Vaccine (HIGH DOSE) 0.5 milliLiter(s) IntraMuscular once  levothyroxine 50 MICROGram(s) Oral daily  losartan 25 milliGRAM(s) Oral daily  mirtazapine 15 milliGRAM(s) Oral at bedtime  multivitamin 1 Tablet(s) Oral daily  senna 2 Tablet(s) Oral at bedtime  spironolactone 25 milliGRAM(s) Oral daily    MEDICATIONS  (PRN):  acetaminophen     Tablet .. 650 milliGRAM(s) Oral every 6 hours PRN Temp greater or equal to 38C (100.4F), Mild Pain (1 - 3)  aluminum hydroxide/magnesium hydroxide/simethicone Suspension 30 milliLiter(s) Oral every 4 hours PRN Dyspepsia  melatonin 3 milliGRAM(s) Oral at bedtime PRN Insomnia  ondansetron Injectable 4 milliGRAM(s) IV Push every 8 hours PRN Nausea and/or Vomiting        I&O's Summary    06 Dec 2024 07:01  -  07 Dec 2024 07:00  --------------------------------------------------------  IN: 120 mL / OUT: 0 mL / NET: 120 mL        PHYSICAL EXAM:  Vital Signs Last 24 Hrs  T(C): 36.6 (07 Dec 2024 10:57), Max: 36.7 (07 Dec 2024 04:55)  T(F): 97.8 (07 Dec 2024 10:57), Max: 98 (07 Dec 2024 04:55)  HR: 49 (07 Dec 2024 10:57) (38 - 64)  BP: 148/59 (07 Dec 2024 10:57) (132/64 - 189/54)  BP(mean): 98 (06 Dec 2024 22:23) (87 - 98)  RR: 19 (07 Dec 2024 10:57) (16 - 20)  SpO2: 97% (07 Dec 2024 10:57) (96% - 100%)    Parameters below as of 07 Dec 2024 04:55  Patient On (Oxygen Delivery Method): room air        GENERAL: NAD, mildly anxious appearing elderly female  HEAD:  Atraumatic, Normocephalic  EYES: EOMI, PERRLA, conjunctiva and sclera clear  ENT: Moist mucous membranes  NECK: Supple, No JVD  CHEST/LUNG: Clear to auscultation bilaterally; No rales, rhonchi, wheezing, or rubs. Unlabored respirations  HEART: Irregularly irregular, No obvious murmurs  ABDOMEN: BSx4; Soft, nontender, nondistended  EXTREMITIES:  2+ Peripheral Pulses, brisk capillary refill. No clubbing, cyanosis, or edema  NERVOUS SYSTEM:  A&Ox3, no focal deficits   SKIN: No rashes or lesions    LABS:                        11.3   9.41  )-----------( 259      ( 07 Dec 2024 06:35 )             34.9     12-07    140  |  109[H]  |  25.3[H]  ----------------------------<  87  4.0   |  20.0[L]  |  0.82    Ca    9.0      07 Dec 2024 06:35  Phos  3.6     12-06  Mg     1.9     12-07    TPro  7.1  /  Alb  3.9  /  TBili  0.4  /  DBili  x   /  AST  13  /  ALT  13  /  AlkPhos  122[H]  12-06    PT/INR - ( 06 Dec 2024 14:45 )   PT: 11.6 sec;   INR: 1.00 ratio         PTT - ( 06 Dec 2024 14:45 )  PTT:30.0 sec      Urinalysis Basic - ( 07 Dec 2024 06:35 )    Color: x / Appearance: x / SG: x / pH: x  Gluc: 87 mg/dL / Ketone: x  / Bili: x / Urobili: x   Blood: x / Protein: x / Nitrite: x   Leuk Esterase: x / RBC: x / WBC x   Sq Epi: x / Non Sq Epi: x / Bacteria: x        CAPILLARY BLOOD GLUCOSE            RADIOLOGY & ADDITIONAL TESTS:  Results Reviewed:   Imaging Personally Reviewed:  Electrocardiogram Personally Reviewed:

## 2024-12-07 NOTE — PATIENT PROFILE ADULT - NSPROPOAURINARYCATHETER_GEN_A_NUR
Patient attended Phase 2 Cardiac Rehab Exercise Session. Further documentation will be scanned into the medical record upon discharge.  
no

## 2024-12-07 NOTE — PROGRESS NOTE ADULT - SUBJECTIVE AND OBJECTIVE BOX
[facility], THE HEART CENTER                                   [address] [place] [address]                                                      PHONE: [phone]                                                         FAX: [phone]  [email or web address]  ---------------------------------------------------------------------------------------------------------------------------------    Overnight events/patient complaints:      NAD feeling well today     Macrobid (Other)    MEDICATIONS  (STANDING):  aspirin  chewable 81 milliGRAM(s) Oral daily  famotidine    Tablet 20 milliGRAM(s) Oral daily  furosemide    Tablet 20 milliGRAM(s) Oral <User Schedule>  heparin   Injectable 5000 Unit(s) SubCutaneous every 8 hours  influenza  Vaccine (HIGH DOSE) 0.5 milliLiter(s) IntraMuscular once  levothyroxine 50 MICROGram(s) Oral daily  losartan 25 milliGRAM(s) Oral daily  mirtazapine 15 milliGRAM(s) Oral at bedtime  multivitamin 1 Tablet(s) Oral daily  senna 2 Tablet(s) Oral at bedtime  spironolactone 25 milliGRAM(s) Oral daily    MEDICATIONS  (PRN):  acetaminophen     Tablet .. 650 milliGRAM(s) Oral every 6 hours PRN Temp greater or equal to 38C (100.4F), Mild Pain (1 - 3)  aluminum hydroxide/magnesium hydroxide/simethicone Suspension 30 milliLiter(s) Oral every 4 hours PRN Dyspepsia  melatonin 3 milliGRAM(s) Oral at bedtime PRN Insomnia  ondansetron Injectable 4 milliGRAM(s) IV Push every 8 hours PRN Nausea and/or Vomiting      Vital Signs Last 24 Hrs  T(C): 36.7 (07 Dec 2024 04:55), Max: 36.7 (07 Dec 2024 04:55)  T(F): 98 (07 Dec 2024 04:55), Max: 98 (07 Dec 2024 04:55)  HR: 57 (07 Dec 2024 08:36) (38 - 64)  BP: 158/70 (07 Dec 2024 08:36) (132/64 - 189/54)  BP(mean): 98 (06 Dec 2024 22:23) (87 - 98)  RR: 18 (07 Dec 2024 04:55) (16 - 20)  SpO2: 97% (07 Dec 2024 04:55) (96% - 100%)    Parameters below as of 07 Dec 2024 04:55  Patient On (Oxygen Delivery Method): room air      ICU Vital Signs Last 24 Hrs  TERESA ULLOA  I&O's Detail    06 Dec 2024 07:01  -  07 Dec 2024 07:00  --------------------------------------------------------  IN:    Oral Fluid: 120 mL  Total IN: 120 mL    OUT:  Total OUT: 0 mL    Total NET: 120 mL        I&O's Summary    06 Dec 2024 07:01  -  07 Dec 2024 07:00  --------------------------------------------------------  IN: 120 mL / OUT: 0 mL / NET: 120 mL      Drug Dosing Weight  TERESA ULLOA      PHYSICAL EXAM:  General: Appears well developed, alert and cooperative.  HEENT: Head; normocephalic, atraumatic.  Eyes: Pupils reactive, cornea wnl.  Neck: Supple, no nodes adenopathy, no NVD or carotid bruit or thyromegaly.  CARDIOVASCULAR: Normal S1 and S2, 2/6 murmur, rub, gallop or lift.   LUNGS: No rales, rhonchi or wheeze. Normal breath sounds bilaterally.  ABDOMEN: Soft, nontender without mass or organomegaly. bowel sounds normoactive.  EXTREMITIES: No clubbing, cyanosis or edema. Distal pulses wnl.   SKIN: warm and dry with normal turgor.  NEURO: Alert/oriented x 3/normal motor exam. No pathologic reflexes.    PSYCH: normal affect.        LABS:                        11.3   9.41  )-----------( 259      ( 07 Dec 2024 06:35 )             34.9     12-07    140  |  109[H]  |  25.3[H]  ----------------------------<  87  4.0   |  20.0[L]  |  0.82    Ca    9.0      07 Dec 2024 06:35  Phos  3.6     12-06  Mg     1.9     12-07    TPro  7.1  /  Alb  3.9  /  TBili  0.4  /  DBili  x   /  AST  13  /  ALT  13  /  AlkPhos  122[H]  12-06    TERESA ULLOA      PT/INR - ( 06 Dec 2024 14:45 )   PT: 11.6 sec;   INR: 1.00 ratio         PTT - ( 06 Dec 2024 14:45 )  PTT:30.0 sec  Urinalysis Basic - ( 07 Dec 2024 06:35 )    Color: x / Appearance: x / SG: x / pH: x  Gluc: 87 mg/dL / Ketone: x  / Bili: x / Urobili: x   Blood: x / Protein: x / Nitrite: x   Leuk Esterase: x / RBC: x / WBC x   Sq Epi: x / Non Sq Epi: x / Bacteria: x        RADIOLOGY & ADDITIONAL STUDIES:    INTERPRETATION OF TELEMETRY (personally reviewed):        ECG: AF SVR none specific changes     ECHO:  < from: TTE Echo Complete w/o Contrast w/ Doppler (12.09.21 @ 15:31) >  Summary:   1. Endocardial visualization was enhanced with intravenous echo contrast.   2. Severely enlarged left atrium.   3. Segmental wall motion abnormalities in distal LAD territory. 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   4. Left ventricular ejection fraction, by visual estimation, is 35 to   40%.   5. Severelyenlarged right atrium.   6. Mildly enlarged right ventricle. RV systolic function is normal.   7. Mild mitral valve regurgitation.   8. Mild tricuspid regurgitation.   9. Trivial pericardial effusion.  10. Moderate pleural effusion in both left and right lateral regions.  11. [facility] cardiology oncall team informed. Dr. welch    < end of copied text >      Assessment and Plan:  In summary, TERESA ULLOA is an 89y Female with past medical history significant for HTN RBBB CVA remote chronic AF not on AC due to bleeding risk hx of ICH HFpEF lasted TTE 9/2022 WMA LV 45% moderate PHTN 54 mmHg mild to moderate and PET CT 2019 LV EF 70% coronary calcifications without ischemia or scar now presents to [facility] due to asymptomatic AF with SVR Dig level 1.8 with stable labs.  HD stable now without any chest pain orthopnea or PND.      Asymptomatic AF with SVR     TELE HR AF improving now 50 to 60's     Plan  Continue to monitor for anther 24 hours   HOLD Toprol XL 25 mg daily and Dig 125 mcg daily   Continue other CV medications     DC planning on Sunday if TELE stable

## 2024-12-07 NOTE — PROGRESS NOTE ADULT - ASSESSMENT
Assessment:  89/F w/ PMHx chronic A-fib (not on anticoagulation due to a hemorrhagic stroke in 2000), PAD, HTN, hypothyroidism, and HFpEF presented to [facility] ED for elevated digoxin level (2.8).  She received 1 vial of Digibind with improvement in heart rate.  Admitted to medicine for acute digoxin toxicity.    Plan:  #Acute digoxin toxicity  – C/w Telemetry  – Outpt Digoxin level 2.8 repeat inpt Digoxin level 1.8  – EKG: A-fib with slow ventricular response with ventricular escape complexes, flattened twaves abnormal EKG  – Denies vision changes, N/B/D, palpitations, chest pain  – S/p Digibind x 1  – Hold Toprol-XL & digoxin  – Monitor potassium level  – Cardiology & toxicology notes appreciated  - Per cardiology, will monitor for another 24 hours.    #HFpEF, HTN  – Hold Toprol-XL & digoxin  – C/w losartan, Aldactone, Lasix  – Monitor telemetry    #Hypothyroidism  –C/w levothyroxine 50 mcg QD    #PAD  – C/w aspirin 81 mg QD    #Constipation  – C/w senna (interchanged for Colace)    #Depression  – C/w mirtazapine 15 mg QD    DVT PPx: SC heparin  Diet: DASH  CODE STATUS: DNR/DNI (trial of NIV), MOLST in chart.  Dispo: Likely home in next 24- 48 hours if stable.  Patient is independent at baseline.

## 2024-12-07 NOTE — PATIENT PROFILE ADULT - FUNCTIONAL ASSESSMENT - BASIC MOBILITY 6.
3-calculated by average/Not able to assess (calculate score using [facility] averaging method)

## 2024-12-07 NOTE — PROVIDER CONTACT NOTE (OTHER) - SITUATION
pt is due for losartan and spironolactone. pt has a heart rate of 57 and BP of 155/69. Pt's granddaughter/caregiver says the pt's heart rate runs in the 50s.

## 2024-12-07 NOTE — PATIENT PROFILE ADULT - FALL HARM RISK - HARM RISK INTERVENTIONS
Assistance with ambulation/Assistance OOB with selected safe patient handling equipment/Communicate Risk of Fall with Harm to all staff/Monitor for mental status changes/Monitor gait and stability/Provide patient with walking aids - walker, cane, crutches/Reinforce activity limits and safety measures with patient and family/Tailored Fall Risk Interventions/Use of alarms - bed, chair and/or voice tab/Visual Cue: Yellow wristband and red socks/Bed in lowest position, wheels locked, appropriate side rails in place/Call bell, personal items and telephone in reach/Instruct patient to call for assistance before getting out of bed or chair/Non-slip footwear when patient is out of bed/[place] to call system/Physically safe environment - no spills, clutter or unnecessary equipment/Purposeful Proactive Rounding/Room/bathroom lighting operational, light cord in reach

## 2024-12-08 VITALS
RESPIRATION RATE: 17 BRPM | HEART RATE: 49 BPM | DIASTOLIC BLOOD PRESSURE: 64 MMHG | SYSTOLIC BLOOD PRESSURE: 145 MMHG | OXYGEN SATURATION: 97 % | TEMPERATURE: 98 F

## 2024-12-08 PROCEDURE — 84484 ASSAY OF TROPONIN QUANT: CPT

## 2024-12-08 PROCEDURE — 84100 ASSAY OF PHOSPHORUS: CPT

## 2024-12-08 PROCEDURE — 96374 THER/PROPH/DIAG INJ IV PUSH: CPT

## 2024-12-08 PROCEDURE — 84443 ASSAY THYROID STIM HORMONE: CPT

## 2024-12-08 PROCEDURE — 80053 COMPREHEN METABOLIC PANEL: CPT

## 2024-12-08 PROCEDURE — 80048 BASIC METABOLIC PNL TOTAL CA: CPT

## 2024-12-08 PROCEDURE — 36415 COLL VENOUS BLD VENIPUNCTURE: CPT

## 2024-12-08 PROCEDURE — 85025 COMPLETE CBC W/AUTO DIFF WBC: CPT

## 2024-12-08 PROCEDURE — 80162 ASSAY OF DIGOXIN TOTAL: CPT

## 2024-12-08 PROCEDURE — 85730 THROMBOPLASTIN TIME PARTIAL: CPT

## 2024-12-08 PROCEDURE — 85610 PROTHROMBIN TIME: CPT

## 2024-12-08 PROCEDURE — 85027 COMPLETE CBC AUTOMATED: CPT

## 2024-12-08 PROCEDURE — 83735 ASSAY OF MAGNESIUM: CPT

## 2024-12-08 PROCEDURE — 99239 HOSP IP/OBS DSCHRG MGMT >30: CPT

## 2024-12-08 PROCEDURE — 83880 ASSAY OF NATRIURETIC PEPTIDE: CPT

## 2024-12-08 PROCEDURE — 99291 CRITICAL CARE FIRST HOUR: CPT | Mod: 25

## 2024-12-08 PROCEDURE — 93005 ELECTROCARDIOGRAM TRACING: CPT

## 2024-12-08 RX ORDER — METOPROLOL TARTRATE 100 MG/1
1 TABLET, FILM COATED ORAL
Refills: 0 | DISCHARGE

## 2024-12-08 RX ORDER — DIGOXIN 250 MCG
1 TABLET ORAL
Refills: 0 | DISCHARGE

## 2024-12-08 RX ADMIN — Medication 5000 UNIT(S): at 06:02

## 2024-12-08 RX ADMIN — Medication 1 TABLET(S): at 11:13

## 2024-12-08 RX ADMIN — LOSARTAN POTASSIUM 25 MILLIGRAM(S): 100 TABLET, FILM COATED ORAL at 06:02

## 2024-12-08 RX ADMIN — Medication 81 MILLIGRAM(S): at 11:13

## 2024-12-08 RX ADMIN — FAMOTIDINE 20 MILLIGRAM(S): 20 TABLET, FILM COATED ORAL at 11:13

## 2024-12-08 RX ADMIN — Medication 25 MILLIGRAM(S): at 06:02

## 2024-12-08 RX ADMIN — Medication 50 MICROGRAM(S): at 06:02

## 2024-12-08 NOTE — DISCHARGE NOTE PROVIDER - NSDCFUSCHEDAPPT_GEN_ALL_CORE_FT
[facility]  UROGYN [address]  Scheduled Appointment: 01/16/2025    [facility]  INTMED [address]  Scheduled Appointment: 01/28/2025

## 2024-12-08 NOTE — DISCHARGE NOTE NURSING/CASE MANAGEMENT/SOCIAL WORK - FINANCIAL ASSISTANCE
[facility] provides services at a reduced cost to those who are determined to be eligible through [facility]’s financial assistance program. Information regarding [facility]’s financial assistance program can be found by going to [email or web address] or by calling [phone].

## 2024-12-08 NOTE — DISCHARGE NOTE PROVIDER - NSDCCPCAREPLAN_GEN_ALL_CORE_FT
PRINCIPAL DISCHARGE DIAGNOSIS  Diagnosis: Elevated digoxin level  Assessment and Plan of Treatment: She was given digibind with improvement in dig level. She was seen by cardiology and recommended to hold metoprolol and digoxin. Follow up with cardiology outpatient within 1 week.

## 2024-12-08 NOTE — DISCHARGE NOTE PROVIDER - ATTENDING DISCHARGE PHYSICAL EXAMINATION:
GENERAL: NAD, Calm elderly female  HEAD:  Atraumatic, Normocephalic  EYES: EOMI, PERRLA, conjunctiva and sclera clear  ENT: Moist mucous membranes  NECK: Supple, No JVD  CHEST/LUNG: Clear to auscultation bilaterally; No rales, rhonchi, wheezing, or rubs. Unlabored respirations  HEART: Irregularly irregular, No obvious murmurs  ABDOMEN: BSx4; Soft, nontender, nondistended  EXTREMITIES:  2+ Peripheral Pulses, brisk capillary refill. No clubbing, cyanosis, or edema  NERVOUS SYSTEM:  A&Ox2-3, no focal deficits   SKIN: No rashes or lesions

## 2024-12-08 NOTE — PROGRESS NOTE ADULT - SUBJECTIVE AND OBJECTIVE BOX
[facility] - [facility], [facility]                                   [address] [place] [address]                                                      PHONE: [phone]                                                         FAX: [phone]  [email or web address]  ---------------------------------------------------------------------------------------------------------------------------------    Overnight events/patient complaints:      NAD feeling well today     Macrobid (Other)    MEDICATIONS  (STANDING):  aspirin  chewable 81 milliGRAM(s) Oral daily  famotidine    Tablet 20 milliGRAM(s) Oral daily  furosemide    Tablet 20 milliGRAM(s) Oral <User Schedule>  heparin   Injectable 5000 Unit(s) SubCutaneous every 8 hours  influenza  Vaccine (HIGH DOSE) 0.5 milliLiter(s) IntraMuscular once  levothyroxine 50 MICROGram(s) Oral daily  losartan 25 milliGRAM(s) Oral daily  mirtazapine 15 milliGRAM(s) Oral at bedtime  multivitamin 1 Tablet(s) Oral daily  senna 2 Tablet(s) Oral at bedtime  spironolactone 25 milliGRAM(s) Oral daily    MEDICATIONS  (PRN):  acetaminophen     Tablet .. 650 milliGRAM(s) Oral every 6 hours PRN Temp greater or equal to 38C (100.4F), Mild Pain (1 - 3)  aluminum hydroxide/magnesium hydroxide/simethicone Suspension 30 milliLiter(s) Oral every 4 hours PRN Dyspepsia  melatonin 3 milliGRAM(s) Oral at bedtime PRN Insomnia  ondansetron Injectable 4 milliGRAM(s) IV Push every 8 hours PRN Nausea and/or Vomiting      Vital Signs Last 24 Hrs  T(C): 36.4 (08 Dec 2024 04:36), Max: 36.6 (07 Dec 2024 10:57)  T(F): 97.5 (08 Dec 2024 04:36), Max: 97.8 (07 Dec 2024 10:57)  HR: 62 (08 Dec 2024 06:01) (49 - 78)  BP: 162/65 (08 Dec 2024 06:01) (132/62 - 162/65)  BP(mean): --  RR: 19 (08 Dec 2024 04:36) (18 - 19)  SpO2: 98% (08 Dec 2024 04:36) (97% - 98%)    Parameters below as of 08 Dec 2024 04:36  Patient On (Oxygen Delivery Method): room air      ICU Vital Signs Last 24 Hrs  TERESA ULLOA  I&O's Detail    07 Dec 2024 07:01  -  08 Dec 2024 07:00  --------------------------------------------------------  IN:    Oral Fluid: 360 mL  Total IN: 360 mL    OUT:  Total OUT: 0 mL    Total NET: 360 mL        I&O's Summary    07 Dec 2024 07:01  -  08 Dec 2024 07:00  --------------------------------------------------------  IN: 360 mL / OUT: 0 mL / NET: 360 mL      Drug Dosing Weight  TERESA ULLOA      PHYSICAL EXAM:  General: Appears well developed, alert and cooperative.  HEENT: Head; normocephalic, atraumatic.  Eyes: Pupils reactive, cornea wnl.  Neck: Supple, no nodes adenopathy, no NVD or carotid bruit or thyromegaly.  CARDIOVASCULAR: Normal S1 and S2, 1/6 murmur, rub, gallop or lift.   LUNGS: No rales, rhonchi or wheeze. Normal breath sounds bilaterally.  ABDOMEN: Soft, nontender without mass or organomegaly. bowel sounds normoactive.  EXTREMITIES: No clubbing, cyanosis or edema. Distal pulses wnl.   SKIN: warm and dry with normal turgor.  NEURO: Alert/oriented x 3/normal motor exam. No pathologic reflexes.    PSYCH: normal affect.        LABS:                        11.3   9.41  )-----------( 259      ( 07 Dec 2024 06:35 )             34.9     12-07    140  |  109[H]  |  25.3[H]  ----------------------------<  87  4.0   |  20.0[L]  |  0.82    Ca    9.0      07 Dec 2024 06:35  Phos  3.6     12-06  Mg     1.9     12-07    TPro  7.1  /  Alb  3.9  /  TBili  0.4  /  DBili  x   /  AST  13  /  ALT  13  /  AlkPhos  122[H]  12-06    TERESA ULLAO      PT/INR - ( 06 Dec 2024 14:45 )   PT: 11.6 sec;   INR: 1.00 ratio         PTT - ( 06 Dec 2024 14:45 )  PTT:30.0 sec  Urinalysis Basic - ( 07 Dec 2024 06:35 )    Color: x / Appearance: x / SG: x / pH: x  Gluc: 87 mg/dL / Ketone: x  / Bili: x / Urobili: x   Blood: x / Protein: x / Nitrite: x   Leuk Esterase: x / RBC: x / WBC x   Sq Epi: x / Non Sq Epi: x / Bacteria: x        RADIOLOGY & ADDITIONAL STUDIES:    INTERPRETATION OF TELEMETRY (personally reviewed):      Assessment and Plan:  In summary, TERESA ULLOA is an 89y Female with past medical history significant for HTN RBBB CVA remote chronic AF not on AC due to bleeding risk hx of ICH HFpEF lasted TTE 9/2022 WMA LV 45% moderate PHTN 54 mmHg mild to moderate and PET CT 2019 LV EF 70% coronary calcifications without ischemia or scar now presents to [facility] due to asymptomatic AF with SVR Dig level 1.8 with stable labs.  HD stable now without any chest pain orthopnea or PND.      Asymptomatic AF with SVR     TELE HR AF improving now 60's     Plan  Continue to HOLD Toprol XL 25 mg daily and Dig 125 mcg daily   Continue other CV medications       DC planning and please recall if needed

## 2024-12-08 NOTE — DISCHARGE NOTE PROVIDER - HOSPITAL COURSE
TERESA ULLOA is an 89y Female with past medical history significant for HTN RBBB CVA remote chronic AF not on AC due to bleeding risk hx of ICH HFpEF lasted TTE 9/2022 WMA LV 45% moderate PHTN 54 mmHg mild to moderate and PET CT 2019 LV EF 70% coronary calcifications without ischemia or scar now presents to [facility] due to asymptomatic AF with SVR Dig level 1.8 with stable labs.  HD stable now without any chest pain orthopnea or PND. Her vitals remained stable and cardiology recommended to continue to hold metoprolol and digoxin. She is stable for discharge home with outpatient follow up at her cardiology and PCP.

## 2024-12-08 NOTE — DISCHARGE NOTE PROVIDER - NSDCMRMEDTOKEN_GEN_ALL_CORE_FT
aspirin 81 mg oral tablet, chewable: 1 tab(s) orally once a day  cholecalciferol oral tablet: 1000 unit(s) orally once a day  docusate sodium 100 mg oral capsule: 1 cap(s) orally 2 times a day  famotidine 20 mg oral tablet: 1 tab(s) orally once a day (at bedtime)  furosemide 20 mg oral tablet: 1 tab(s) orally 3 times a week Monday/Wednesday/Friday  losartan 25 mg oral tablet: 1 tab(s) orally once a day  mirtazapine 15 mg oral tablet: 1 tab(s) orally once a day  Multiple Vitamins oral tablet: 1 tab(s) orally once a day  spironolactone 25 mg oral tablet: 1 tab(s) orally once a day  Synthroid 50 mcg (0.05 mg) oral tablet: 1 tab(s) orally once a day

## 2024-12-08 NOTE — DISCHARGE NOTE NURSING/CASE MANAGEMENT/SOCIAL WORK - PATIENT PORTAL LINK FT
You can access the FollowMyHealth Patient Portal offered by [facility] by registering at the following website: [email or web address]. By joining [employer]’s FollowMyHealth portal, you will also be able to view your health information using other applications (apps) compatible with our system.

## 2024-12-09 ENCOUNTER — EMERGENCY (EMERGENCY)
Facility: HOSPITAL | Age: 88
LOS: 1 days | Discharge: HOME | End: 2024-12-09
Attending: EMERGENCY MEDICINE
Payer: MEDICARE

## 2024-12-09 VITALS
HEART RATE: 85 BPM | WEIGHT: 117.29 LBS | DIASTOLIC BLOOD PRESSURE: 84 MMHG | SYSTOLIC BLOOD PRESSURE: 144 MMHG | OXYGEN SATURATION: 97 % | TEMPERATURE: 97 F | RESPIRATION RATE: 18 BRPM

## 2024-12-09 DIAGNOSIS — Z98.890 OTHER SPECIFIED POSTPROCEDURAL STATES: Chronic | ICD-10-CM

## 2024-12-09 DIAGNOSIS — Z98.891 HISTORY OF UTERINE SCAR FROM PREVIOUS SURGERY: Chronic | ICD-10-CM

## 2024-12-09 LAB
APPEARANCE UR: ABNORMAL
BACTERIA # UR AUTO: ABNORMAL /HPF
BILIRUB UR-MCNC: NEGATIVE — SIGNIFICANT CHANGE UP
CAST: 3 /LPF — SIGNIFICANT CHANGE UP (ref 0–4)
COLOR SPEC: YELLOW — SIGNIFICANT CHANGE UP
DIFF PNL FLD: ABNORMAL
GLUCOSE UR QL: NEGATIVE MG/DL — SIGNIFICANT CHANGE UP
KETONES UR-MCNC: NEGATIVE MG/DL — SIGNIFICANT CHANGE UP
LEUKOCYTE ESTERASE UR-ACNC: ABNORMAL
NITRITE UR-MCNC: POSITIVE
PH UR: 5.5 — SIGNIFICANT CHANGE UP (ref 5–8)
PROT UR-MCNC: 30 MG/DL
RBC CASTS # UR COMP ASSIST: 1 /HPF — SIGNIFICANT CHANGE UP (ref 0–4)
SP GR SPEC: 1.03 — SIGNIFICANT CHANGE UP (ref 1–1.03)
SQUAMOUS # UR AUTO: 2 /HPF — SIGNIFICANT CHANGE UP (ref 0–5)
UROBILINOGEN FLD QL: 1 MG/DL — SIGNIFICANT CHANGE UP (ref 0.2–1)
WBC UR QL: 241 /HPF — HIGH (ref 0–5)

## 2024-12-09 PROCEDURE — 96374 THER/PROPH/DIAG INJ IV PUSH: CPT | Mod: XU

## 2024-12-09 PROCEDURE — 87637 SARSCOV2&INF A&B&RSV AMP PRB: CPT

## 2024-12-09 PROCEDURE — 80053 COMPREHEN METABOLIC PANEL: CPT

## 2024-12-09 PROCEDURE — 87086 URINE CULTURE/COLONY COUNT: CPT

## 2024-12-09 PROCEDURE — 71045 X-RAY EXAM CHEST 1 VIEW: CPT | Mod: 26

## 2024-12-09 PROCEDURE — 84484 ASSAY OF TROPONIN QUANT: CPT

## 2024-12-09 PROCEDURE — 74177 CT ABD & PELVIS W/CONTRAST: CPT | Mod: 26,MC

## 2024-12-09 PROCEDURE — 99285 EMERGENCY DEPT VISIT HI MDM: CPT | Mod: GC

## 2024-12-09 PROCEDURE — 85027 COMPLETE CBC AUTOMATED: CPT

## 2024-12-09 PROCEDURE — 83690 ASSAY OF LIPASE: CPT

## 2024-12-09 PROCEDURE — 36415 COLL VENOUS BLD VENIPUNCTURE: CPT

## 2024-12-09 PROCEDURE — 87077 CULTURE AEROBIC IDENTIFY: CPT

## 2024-12-09 PROCEDURE — 81001 URINALYSIS AUTO W/SCOPE: CPT

## 2024-12-09 PROCEDURE — 93010 ELECTROCARDIOGRAM REPORT: CPT

## 2024-12-09 PROCEDURE — 71045 X-RAY EXAM CHEST 1 VIEW: CPT

## 2024-12-09 PROCEDURE — 87186 SC STD MICRODIL/AGAR DIL: CPT

## 2024-12-09 PROCEDURE — 99285 EMERGENCY DEPT VISIT HI MDM: CPT | Mod: 25

## 2024-12-09 PROCEDURE — 93005 ELECTROCARDIOGRAM TRACING: CPT

## 2024-12-09 PROCEDURE — 74177 CT ABD & PELVIS W/CONTRAST: CPT | Mod: MC

## 2024-12-09 RX ORDER — CEFTRIAXONE SODIUM 1 G
1000 VIAL (EA) INJECTION ONCE
Refills: 0 | Status: COMPLETED | OUTPATIENT
Start: 2024-12-09 | End: 2024-12-09

## 2024-12-09 RX ORDER — CEPHALEXIN 500 MG
1 CAPSULE ORAL
Qty: 14 | Refills: 0
Start: 2024-12-09 | End: 2024-12-15

## 2024-12-09 RX ADMIN — Medication 1000 MILLIGRAM(S): at 07:07

## 2024-12-09 NOTE — ED ADULT NURSE NOTE - DISCHARGE DATE/TIME
09-Dec-2024 07:10 Staging Info: By selecting yes to the question above you will include information on AJCC 8 tumor staging in your Mohs note. Information on tumor staging will be automatically added for SCCs on the head and neck. AJCC 8 includes tumor size, tumor depth, perineural involvement and bone invasion.

## 2024-12-09 NOTE — ED PROVIDER NOTE - PATIENT PORTAL LINK FT
You can access the FollowMyHealth Patient Portal offered by [facility] by registering at the following website: [email or web address]. By joining [employer]’s FollowMyHealth portal, you will also be able to view your health information using other applications (apps) compatible with our system.

## 2024-12-09 NOTE — ED PROVIDER NOTE - PROGRESS NOTE DETAILS
CT imaging negative, ua shows uti. Given rocephin for uti.  discussed results with pt, instructed to follow up with cardiology, pt has no chest pain or SOB despite mild elevation in troponin. ekg shows afib, known afib, no ischemic changes. Prescribed keflex to pharmacy. given strict return precautions. CT imaging shows gastroenteritis, ua shows uti. Given rocephin for uti.  discussed results with pt, instructed to follow up with cardiology, pt has no chest pain or SOB despite mild elevation in troponin. ekg shows afib, known afib, no ischemic changes. Prescribed keflex to pharmacy. given strict return precautions.

## 2024-12-09 NOTE — ED PROVIDER NOTE - CLINICAL SUMMARY MEDICAL DECISION MAKING FREE TEXT BOX
Pt is a 89 yo female with PMH of CHF on lasix, afib not on AC due to ICH, CVA 2002, presenting with vomiting. Pt had vomiting/nausea starting tonight at around 9PM. Pt was recently admitted for digoxin toxicity and discharged yesterday. pt denies any chest pain, SOB, abdominal pain, urinary symptoms, fever.     vs stable, exam shows mild epigastric tenderness. cbc, cmp, lipase, ekg, trop, ns bolus, zofran given. will reassess. CT abd pelvis with iv contrast. Pt is a 89 yo female with PMH of CHF on lasix, afib not on AC due to ICH, CVA 2002, hypothyroidism, chronic UTIs presenting with vomiting. Pt had vomiting/nausea starting tonight at around 9PM. Pt was recently admitted for digoxin toxicity and discharged yesterday. pt denies any chest pain, SOB, abdominal pain, urinary symptoms, fever.    vs stable, exam shows mild epigastric tenderness. cbc, cmp, lipase, ekg, trop, ns bolus, zofran given. will reassess. CT abd pelvis with iv contrast. ua/uc, will need to straight cath according to fam at bedside to obtain urine sample. Pt is a 89 yo female with PMH of CHF on lasix, afib not on AC due to ICH, CVA 2002, hypothyroidism, chronic UTIs presenting with vomiting. Pt had vomiting/nausea starting tonight at around 9PM. Pt was recently admitted for digoxin toxicity and discharged yesterday. pt denies any chest pain, SOB, abdominal pain, urinary symptoms, fever.    vs stable, exam shows mild epigastric tenderness. cbc, cmp, lipase, ekg, trop, ns bolus, zofran given. will reassess. CT abd pelvis with iv contrast. ua/uc, will need to straight cath according to fam at bedside to obtain urine sample.    CT imaging shows gastroenteritis, no other findings, ua shows uti. Given rocephin for uti.  discussed results with pt, instructed to follow up with cardiology, pt has no chest pain or SOB despite mild elevation in troponin. ekg shows afib, known afib, no ischemic changes. Prescribed keflex to pharmacy. given strict return precautions.

## 2024-12-09 NOTE — ED PROVIDER NOTE - PHYSICAL EXAMINATION
General: NAD  HEENT: Normocephalic, atraumatic  Neck: No apparent stiffness or JVD  Pulm: Chest wall symmetric and nontender, lungs clear to ascultation   Cardiac: Regular rate and regular rhythm  Abdomen: mild epigastric tenderness and nondistended  Skin: Skin is warm, dry and intact without rashes or lesions.  Neuro: No motor or sensory deficits above reported baseline  MSK: No deformity or tenderness above reported baseline

## 2024-12-09 NOTE — ED PROVIDER NOTE - OBJECTIVE STATEMENT
Pt is a 89 yo female with PMH of CHF on lasix, afib not on AC due to ICH, CVA 2002, presenting with vomiting. Pt had vomiting/nausea starting tonight at around 9PM. Pt was recently admitted for digoxin toxicity and discharged yesterday. pt denies any chest pain, SOB, abdominal pain, urinary symptoms, fever. Pt is a 89 yo female with PMH of CHF on lasix, afib not on AC due to ICH, CVA 2002, hypothyroidism, chronic UTIs presenting with vomiting. Pt had vomiting/nausea starting tonight at around 9PM. Pt was recently admitted for digoxin toxicity and discharged yesterday. pt denies any chest pain, SOB, abdominal pain, urinary symptoms, fever.

## 2024-12-09 NOTE — ED PROVIDER NOTE - ATTENDING CONTRIBUTION TO CARE
Marisol: I performed a face to face evaluation of this patient and performed a full history and physical examination on the patient.  I agree with the resident's history, physical examination, and plan of the patient unless otherwise noted. My brief assessment is as follows: 89 yo female with PMH of CHF on lasix, afib not on AC due to ICH, CVA 2002, presenting with vomiting. recently admitted for elevated digoxin (no longer taking and received digibind). denies significant abd pain, has had multiple abd surgeries. no cp/sob. no other complaints. non toxic, nad, ctab, rrr, abd benign, neuro grossly intact labs, ct abd/pel, symptom control, reassess

## 2024-12-09 NOTE — ED ADULT NURSE NOTE - OBJECTIVE STATEMENT
pt axo3 with equal and unlabored resp came in with vomtingx1 day. pt family at bedside endorses chronic UTI's.

## 2024-12-10 LAB — DIGOXIN SERPL-MCNC: 2 NG/ML

## 2024-12-14 DIAGNOSIS — I50.9 HEART FAILURE, UNSPECIFIED: ICD-10-CM

## 2024-12-14 DIAGNOSIS — I48.91 UNSPECIFIED ATRIAL FIBRILLATION: ICD-10-CM

## 2024-12-14 DIAGNOSIS — E03.9 HYPOTHYROIDISM, UNSPECIFIED: ICD-10-CM

## 2024-12-14 DIAGNOSIS — R11.2 NAUSEA WITH VOMITING, UNSPECIFIED: ICD-10-CM

## 2024-12-14 DIAGNOSIS — Z88.8 ALLERGY STATUS TO OTHER DRUGS, MEDICAMENTS AND BIOLOGICAL SUBSTANCES: ICD-10-CM

## 2024-12-14 DIAGNOSIS — N39.0 URINARY TRACT INFECTION, SITE NOT SPECIFIED: ICD-10-CM

## 2024-12-18 ENCOUNTER — APPOINTMENT (OUTPATIENT)
Dept: INTERNAL MEDICINE | Facility: CLINIC | Age: 88
End: 2024-12-18
Payer: MEDICARE

## 2024-12-18 VITALS
OXYGEN SATURATION: 97 % | HEART RATE: 87 BPM | DIASTOLIC BLOOD PRESSURE: 70 MMHG | WEIGHT: 121 LBS | RESPIRATION RATE: 14 BRPM | SYSTOLIC BLOOD PRESSURE: 120 MMHG | TEMPERATURE: 97.2 F | HEIGHT: 59 IN | BODY MASS INDEX: 24.39 KG/M2

## 2024-12-18 DIAGNOSIS — Z51.81 ENCOUNTER FOR THERAPEUTIC DRUG LVL MONITORING: ICD-10-CM

## 2024-12-18 DIAGNOSIS — K52.9 NONINFECTIVE GASTROENTERITIS AND COLITIS, UNSPECIFIED: ICD-10-CM

## 2024-12-18 DIAGNOSIS — R78.89 FINDING OF OTHER SPECIFIED SUBSTANCES, NOT NORMALLY FOUND IN BLOOD: ICD-10-CM

## 2024-12-18 DIAGNOSIS — Z09 ENCOUNTER FOR FOLLOW-UP EXAMINATION AFTER COMPLETED TREATMENT FOR CONDITIONS OTHER THAN MALIGNANT NEOPLASM: ICD-10-CM

## 2024-12-18 DIAGNOSIS — N39.0 URINARY TRACT INFECTION, SITE NOT SPECIFIED: ICD-10-CM

## 2024-12-18 DIAGNOSIS — D72.829 ELEVATED WHITE BLOOD CELL COUNT, UNSPECIFIED: ICD-10-CM

## 2024-12-18 DIAGNOSIS — I48.20 CHRONIC ATRIAL FIBRILLATION, UNSP: ICD-10-CM

## 2024-12-18 DIAGNOSIS — R46.89 OTHER SYMPTOMS AND SIGNS INVOLVING APPEARANCE AND BEHAVIOR: ICD-10-CM

## 2024-12-18 DIAGNOSIS — Z79.899 ENCOUNTER FOR THERAPEUTIC DRUG LVL MONITORING: ICD-10-CM

## 2024-12-18 DIAGNOSIS — I10 ESSENTIAL (PRIMARY) HYPERTENSION: ICD-10-CM

## 2024-12-18 DIAGNOSIS — Z86.19 PERSONAL HISTORY OF OTHER INFECTIOUS AND PARASITIC DISEASES: ICD-10-CM

## 2024-12-18 PROCEDURE — 99495 TRANSJ CARE MGMT MOD F2F 14D: CPT

## 2024-12-26 ENCOUNTER — RX RENEWAL (OUTPATIENT)
Age: 88
End: 2024-12-26

## 2024-12-30 ENCOUNTER — RX RENEWAL (OUTPATIENT)
Age: 88
End: 2024-12-30

## 2025-01-16 ENCOUNTER — APPOINTMENT (OUTPATIENT)
Dept: UROGYNECOLOGY | Facility: CLINIC | Age: 89
End: 2025-01-16

## 2025-01-16 VITALS
WEIGHT: 121 LBS | HEART RATE: 81 BPM | BODY MASS INDEX: 24.39 KG/M2 | OXYGEN SATURATION: 95 % | DIASTOLIC BLOOD PRESSURE: 72 MMHG | HEIGHT: 59 IN | SYSTOLIC BLOOD PRESSURE: 140 MMHG

## 2025-01-16 PROCEDURE — 99213 OFFICE O/P EST LOW 20 MIN: CPT

## 2025-01-28 ENCOUNTER — APPOINTMENT (OUTPATIENT)
Dept: INTERNAL MEDICINE | Facility: CLINIC | Age: 89
End: 2025-01-28
Payer: MEDICARE

## 2025-01-28 VITALS
OXYGEN SATURATION: 97 % | RESPIRATION RATE: 14 BRPM | TEMPERATURE: 97.3 F | HEART RATE: 96 BPM | SYSTOLIC BLOOD PRESSURE: 120 MMHG | BODY MASS INDEX: 24.39 KG/M2 | WEIGHT: 121 LBS | DIASTOLIC BLOOD PRESSURE: 68 MMHG | HEIGHT: 59 IN

## 2025-01-28 DIAGNOSIS — R63.4 ABNORMAL WEIGHT LOSS: ICD-10-CM

## 2025-01-28 DIAGNOSIS — Z09 ENCOUNTER FOR FOLLOW-UP EXAMINATION AFTER COMPLETED TREATMENT FOR CONDITIONS OTHER THAN MALIGNANT NEOPLASM: ICD-10-CM

## 2025-01-28 DIAGNOSIS — R26.81 UNSTEADINESS ON FEET: ICD-10-CM

## 2025-01-28 DIAGNOSIS — Z86.2 PERSONAL HISTORY OF DISEASES OF THE BLOOD AND BLOOD-FORMING ORGANS AND CERTAIN DISORDERS INVOLVING THE IMMUNE MECHANISM: ICD-10-CM

## 2025-01-28 DIAGNOSIS — E03.9 HYPOTHYROIDISM, UNSPECIFIED: ICD-10-CM

## 2025-01-28 DIAGNOSIS — I10 ESSENTIAL (PRIMARY) HYPERTENSION: ICD-10-CM

## 2025-01-28 PROCEDURE — 99214 OFFICE O/P EST MOD 30 MIN: CPT

## 2025-01-28 PROCEDURE — 36415 COLL VENOUS BLD VENIPUNCTURE: CPT

## 2025-01-29 DIAGNOSIS — R74.8 ABNORMAL LEVELS OF OTHER SERUM ENZYMES: ICD-10-CM

## 2025-01-29 DIAGNOSIS — Z00.00 ENCOUNTER FOR GENERAL ADULT MEDICAL EXAMINATION W/OUT ABNORMAL FINDINGS: ICD-10-CM

## 2025-01-29 LAB
ALBUMIN SERPL ELPH-MCNC: 4.2 G/DL
ALP BLD-CCNC: 110 U/L
ALT SERPL-CCNC: 12 U/L
ANION GAP SERPL CALC-SCNC: 12 MMOL/L
AST SERPL-CCNC: 14 U/L
BILIRUB SERPL-MCNC: 0.3 MG/DL
BUN SERPL-MCNC: 31 MG/DL
CALCIUM SERPL-MCNC: 9.5 MG/DL
CHLORIDE SERPL-SCNC: 104 MMOL/L
CO2 SERPL-SCNC: 23 MMOL/L
CREAT SERPL-MCNC: 1.18 MG/DL
EGFR: 43 ML/MIN/1.73M2
GLUCOSE SERPL-MCNC: 112 MG/DL
POTASSIUM SERPL-SCNC: 4.2 MMOL/L
PROT SERPL-MCNC: 6.8 G/DL
SODIUM SERPL-SCNC: 139 MMOL/L
TSH SERPL-ACNC: 22.6 UIU/ML

## 2025-02-03 ENCOUNTER — APPOINTMENT (OUTPATIENT)
Dept: UROGYNECOLOGY | Facility: CLINIC | Age: 89
End: 2025-02-03
Payer: MEDICARE

## 2025-02-03 ENCOUNTER — TRANSCRIPTION ENCOUNTER (OUTPATIENT)
Age: 89
End: 2025-02-03

## 2025-02-03 VITALS
HEART RATE: 87 BPM | BODY MASS INDEX: 24.39 KG/M2 | SYSTOLIC BLOOD PRESSURE: 125 MMHG | DIASTOLIC BLOOD PRESSURE: 67 MMHG | HEIGHT: 59 IN | WEIGHT: 121 LBS

## 2025-02-03 DIAGNOSIS — R41.89 OTHER SYMPTOMS AND SIGNS INVOLVING COGNITIVE FUNCTIONS AND AWARENESS: ICD-10-CM

## 2025-02-03 DIAGNOSIS — N39.0 URINARY TRACT INFECTION, SITE NOT SPECIFIED: ICD-10-CM

## 2025-02-03 LAB
BILIRUB UR QL STRIP: NEGATIVE
CLARITY UR: CLEAR
COLLECTION METHOD: NORMAL
GLUCOSE UR-MCNC: NEGATIVE
HCG UR QL: 0.2 EU/DL
HGB UR QL STRIP.AUTO: NORMAL
KETONES UR-MCNC: NEGATIVE
LEUKOCYTE ESTERASE UR QL STRIP: NEGATIVE
NITRITE UR QL STRIP: NEGATIVE
PH UR STRIP: 5.5
PROT UR STRIP-MCNC: NEGATIVE
SP GR UR STRIP: 1.01

## 2025-02-03 PROCEDURE — 99213 OFFICE O/P EST LOW 20 MIN: CPT | Mod: 25

## 2025-02-03 PROCEDURE — 81003 URINALYSIS AUTO W/O SCOPE: CPT | Mod: QW

## 2025-02-03 PROCEDURE — 51701 INSERT BLADDER CATHETER: CPT | Mod: 59

## 2025-02-04 LAB
APPEARANCE: CLEAR
BACTERIA: NEGATIVE /HPF
BILIRUBIN URINE: NEGATIVE
BLOOD URINE: NEGATIVE
CAST: 0 /LPF
COLOR: YELLOW
EPITHELIAL CELLS: 0 /HPF
GLUCOSE QUALITATIVE U: NEGATIVE MG/DL
KETONES URINE: NEGATIVE MG/DL
LEUKOCYTE ESTERASE URINE: NEGATIVE
MICROSCOPIC-UA: NORMAL
NITRITE URINE: NEGATIVE
PH URINE: 6
PROTEIN URINE: NEGATIVE MG/DL
RED BLOOD CELLS URINE: 0 /HPF
SPECIFIC GRAVITY URINE: 1.01
UROBILINOGEN URINE: 0.2 MG/DL
WHITE BLOOD CELLS URINE: 0 /HPF

## 2025-02-05 LAB — BACTERIA UR CULT: NORMAL

## 2025-02-10 ENCOUNTER — TRANSCRIPTION ENCOUNTER (OUTPATIENT)
Age: 89
End: 2025-02-10

## 2025-02-14 ENCOUNTER — APPOINTMENT (OUTPATIENT)
Dept: INTERNAL MEDICINE | Facility: CLINIC | Age: 89
End: 2025-02-14

## 2025-02-22 ENCOUNTER — NON-APPOINTMENT (OUTPATIENT)
Age: 89
End: 2025-02-22

## 2025-04-28 ENCOUNTER — RX RENEWAL (OUTPATIENT)
Age: 89
End: 2025-04-28

## 2025-05-20 ENCOUNTER — LABORATORY RESULT (OUTPATIENT)
Age: 89
End: 2025-05-20

## 2025-05-21 DIAGNOSIS — E03.9 HYPOTHYROIDISM, UNSPECIFIED: ICD-10-CM

## 2025-05-21 RX ORDER — LEVOTHYROXINE SODIUM 0.07 MG/1
75 TABLET ORAL AS DIRECTED
Qty: 90 | Refills: 0 | Status: ACTIVE | COMMUNITY
Start: 2025-05-21 | End: 1900-01-01

## 2025-05-27 ENCOUNTER — RX RENEWAL (OUTPATIENT)
Age: 89
End: 2025-05-27

## 2025-06-17 ENCOUNTER — NON-APPOINTMENT (OUTPATIENT)
Age: 89
End: 2025-06-17

## 2025-06-19 ENCOUNTER — APPOINTMENT (OUTPATIENT)
Dept: INTERNAL MEDICINE | Facility: CLINIC | Age: 89
End: 2025-06-19
Payer: MEDICARE

## 2025-06-19 VITALS
HEIGHT: 59 IN | BODY MASS INDEX: 29.43 KG/M2 | SYSTOLIC BLOOD PRESSURE: 128 MMHG | HEART RATE: 88 BPM | WEIGHT: 146 LBS | TEMPERATURE: 97.7 F | RESPIRATION RATE: 14 BRPM | DIASTOLIC BLOOD PRESSURE: 80 MMHG | OXYGEN SATURATION: 97 %

## 2025-06-19 PROCEDURE — G0439: CPT

## 2025-06-19 PROCEDURE — 36415 COLL VENOUS BLD VENIPUNCTURE: CPT

## 2025-06-19 NOTE — ED PROVIDER NOTE - TEMPLATE
PT reports left sided jaw and face pain and believes it is related to dental pain. Reports pain began yesterday  
Cardiac

## 2025-06-20 LAB
25(OH)D3 SERPL-MCNC: 49.7 NG/ML
ALBUMIN SERPL ELPH-MCNC: 4.2 G/DL
ALP BLD-CCNC: 114 U/L
ALT SERPL-CCNC: 18 U/L
ANION GAP SERPL CALC-SCNC: 14 MMOL/L
AST SERPL-CCNC: 13 U/L
BASOPHILS # BLD AUTO: 0.12 K/UL
BASOPHILS NFR BLD AUTO: 1.4 %
BILIRUB SERPL-MCNC: 0.3 MG/DL
BUN SERPL-MCNC: 46 MG/DL
CALCIUM SERPL-MCNC: 9.5 MG/DL
CHLORIDE SERPL-SCNC: 105 MMOL/L
CHOLEST SERPL-MCNC: 181 MG/DL
CO2 SERPL-SCNC: 20 MMOL/L
CREAT SERPL-MCNC: 1.14 MG/DL
EGFRCR SERPLBLD CKD-EPI 2021: 45 ML/MIN/1.73M2
EOSINOPHIL # BLD AUTO: 0.5 K/UL
EOSINOPHIL NFR BLD AUTO: 5.9 %
ESTIMATED AVERAGE GLUCOSE: 126 MG/DL
FOLATE SERPL-MCNC: >20 NG/ML
GLUCOSE SERPL-MCNC: 89 MG/DL
HBA1C MFR BLD HPLC: 6 %
HCT VFR BLD CALC: 36.4 %
HDLC SERPL-MCNC: 59 MG/DL
HGB BLD-MCNC: 11.5 G/DL
IMM GRANULOCYTES NFR BLD AUTO: 0.9 %
LDLC SERPL-MCNC: 108 MG/DL
LYMPHOCYTES # BLD AUTO: 1.93 K/UL
LYMPHOCYTES NFR BLD AUTO: 22.8 %
MAN DIFF?: NORMAL
MCHC RBC-ENTMCNC: 29.1 PG
MCHC RBC-ENTMCNC: 31.6 G/DL
MCV RBC AUTO: 92.2 FL
MONOCYTES # BLD AUTO: 0.66 K/UL
MONOCYTES NFR BLD AUTO: 7.8 %
NEUTROPHILS # BLD AUTO: 5.18 K/UL
NEUTROPHILS NFR BLD AUTO: 61.2 %
NONHDLC SERPL-MCNC: 122 MG/DL
PLATELET # BLD AUTO: 276 K/UL
POTASSIUM SERPL-SCNC: 4.6 MMOL/L
PROT SERPL-MCNC: 7.2 G/DL
RBC # BLD: 3.95 M/UL
RBC # FLD: 13.1 %
SODIUM SERPL-SCNC: 139 MMOL/L
TRIGL SERPL-MCNC: 70 MG/DL
TSH SERPL-ACNC: 5.55 UIU/ML
VIT B12 SERPL-MCNC: 967 PG/ML
WBC # FLD AUTO: 8.47 K/UL

## 2025-06-30 ENCOUNTER — APPOINTMENT (OUTPATIENT)
Dept: INTERNAL MEDICINE | Facility: CLINIC | Age: 89
End: 2025-06-30

## 2025-06-30 NOTE — PROGRESS NOTE ADULT - ASSESSMENT
I would recommend not mixing THC products and Suboxone follow up with PCP   pt. is  an 88 y/o Female with history of hypertension, CVA no residual deficits , atrial fibrillation not on anticoagulation due to history of ICH in year 2000 as per pt's granddaughter ,  who presents with LE swelling for the last three weeks and also, dry coug on and off, sob with activity, no fever.  sob better at rest, sleeps with head up. As per family no prior h/o chf. LAst hospital admission was more yolanda a year ago but as per family had ER visits in july and november this year for uti/ pneumonia. denies chest pain, palpitations, dizziness or lightheadedness.  no abd. pain, no n/v/d. reports urine urgency but no pain with urination, pt's u/a is pending at the time of admission, afebrile in the ER. no swallowing difficulty reported but appetite is poor.

## 2025-07-09 ENCOUNTER — APPOINTMENT (OUTPATIENT)
Dept: UROGYNECOLOGY | Facility: CLINIC | Age: 89
End: 2025-07-09